# Patient Record
Sex: MALE | Race: OTHER | Employment: UNEMPLOYED | URBAN - METROPOLITAN AREA
[De-identification: names, ages, dates, MRNs, and addresses within clinical notes are randomized per-mention and may not be internally consistent; named-entity substitution may affect disease eponyms.]

---

## 2022-04-13 ENCOUNTER — HOSPITAL ENCOUNTER (EMERGENCY)
Age: 37
Discharge: HOME | End: 2022-04-13
Attending: EMERGENCY MEDICINE

## 2022-04-13 ENCOUNTER — APPOINTMENT (OUTPATIENT)
Dept: ULTRASOUND IMAGING | Age: 37
End: 2022-04-13

## 2022-04-13 VITALS
HEIGHT: 69 IN | DIASTOLIC BLOOD PRESSURE: 77 MMHG | HEART RATE: 96 BPM | TEMPERATURE: 98.2 F | OXYGEN SATURATION: 100 % | RESPIRATION RATE: 18 BRPM | BODY MASS INDEX: 27.36 KG/M2 | SYSTOLIC BLOOD PRESSURE: 130 MMHG | WEIGHT: 184.75 LBS

## 2022-04-13 DIAGNOSIS — N45.3 EPIDIDYMO-ORCHITIS: Primary | ICD-10-CM

## 2022-04-13 LAB
BILIRUBIN URINE: NEGATIVE
BLOOD, URINE: ABNORMAL
CLARITY: ABNORMAL
COLOR: YELLOW
EPITHELIAL CELLS, UA: 2 /HPF (ref 0–5)
GLUCOSE URINE: >=1000 MG/DL
HYALINE CASTS: 1 /LPF (ref 0–8)
KETONES, URINE: 15 MG/DL
LEUKOCYTE ESTERASE, URINE: ABNORMAL
MICROSCOPIC EXAMINATION: YES
NITRITE, URINE: NEGATIVE
PH UA: 6 (ref 5–8)
PROTEIN UA: NEGATIVE MG/DL
RBC UA: 20 /HPF (ref 0–4)
SPECIFIC GRAVITY UA: 1.01 (ref 1–1.03)
SPECIMEN TYPE: NORMAL
TRICHOMONAS VAGINALIS SCREEN: NEGATIVE
URINE REFLEX TO CULTURE: YES
URINE TYPE: ABNORMAL
UROBILINOGEN, URINE: 1 E.U./DL
WBC UA: 397 /HPF (ref 0–5)

## 2022-04-13 PROCEDURE — 87491 CHLMYD TRACH DNA AMP PROBE: CPT

## 2022-04-13 PROCEDURE — 87808 TRICHOMONAS ASSAY W/OPTIC: CPT

## 2022-04-13 PROCEDURE — 99283 EMERGENCY DEPT VISIT LOW MDM: CPT

## 2022-04-13 PROCEDURE — 87186 SC STD MICRODIL/AGAR DIL: CPT

## 2022-04-13 PROCEDURE — 81001 URINALYSIS AUTO W/SCOPE: CPT

## 2022-04-13 PROCEDURE — 96372 THER/PROPH/DIAG INJ SC/IM: CPT

## 2022-04-13 PROCEDURE — 87077 CULTURE AEROBIC IDENTIFY: CPT

## 2022-04-13 PROCEDURE — 87591 N.GONORRHOEAE DNA AMP PROB: CPT

## 2022-04-13 PROCEDURE — 6370000000 HC RX 637 (ALT 250 FOR IP): Performed by: PHYSICIAN ASSISTANT

## 2022-04-13 PROCEDURE — 87086 URINE CULTURE/COLONY COUNT: CPT

## 2022-04-13 PROCEDURE — 6360000002 HC RX W HCPCS: Performed by: PHYSICIAN ASSISTANT

## 2022-04-13 PROCEDURE — 76870 US EXAM SCROTUM: CPT

## 2022-04-13 RX ORDER — CIPROFLOXACIN 500 MG/1
500 TABLET, FILM COATED ORAL 2 TIMES DAILY
Qty: 28 TABLET | Refills: 0 | Status: SHIPPED | OUTPATIENT
Start: 2022-04-13 | End: 2022-04-27

## 2022-04-13 RX ORDER — OXYCODONE HYDROCHLORIDE AND ACETAMINOPHEN 5; 325 MG/1; MG/1
2 TABLET ORAL ONCE
Status: COMPLETED | OUTPATIENT
Start: 2022-04-13 | End: 2022-04-13

## 2022-04-13 RX ORDER — CEFTRIAXONE 500 MG/1
500 INJECTION, POWDER, FOR SOLUTION INTRAMUSCULAR; INTRAVENOUS ONCE
Status: COMPLETED | OUTPATIENT
Start: 2022-04-13 | End: 2022-04-13

## 2022-04-13 RX ADMIN — OXYCODONE HYDROCHLORIDE AND ACETAMINOPHEN 2 TABLET: 5; 325 TABLET ORAL at 15:33

## 2022-04-13 RX ADMIN — CEFTRIAXONE SODIUM 500 MG: 500 INJECTION, POWDER, FOR SOLUTION INTRAMUSCULAR; INTRAVENOUS at 16:48

## 2022-04-13 ASSESSMENT — ENCOUNTER SYMPTOMS
COUGH: 0
EYE PAIN: 0
SHORTNESS OF BREATH: 0
BACK PAIN: 0
VOMITING: 0
SORE THROAT: 0
NAUSEA: 0
ABDOMINAL PAIN: 0

## 2022-04-13 ASSESSMENT — PAIN DESCRIPTION - FREQUENCY: FREQUENCY: CONTINUOUS

## 2022-04-13 ASSESSMENT — PAIN DESCRIPTION - LOCATION: LOCATION: OTHER (COMMENT)

## 2022-04-13 ASSESSMENT — PAIN - FUNCTIONAL ASSESSMENT: PAIN_FUNCTIONAL_ASSESSMENT: 0-10

## 2022-04-13 ASSESSMENT — PAIN DESCRIPTION - PAIN TYPE: TYPE: ACUTE PAIN

## 2022-04-13 ASSESSMENT — PAIN DESCRIPTION - DESCRIPTORS: DESCRIPTORS: SORE

## 2022-04-13 ASSESSMENT — PAIN SCALES - GENERAL: PAINLEVEL_OUTOF10: 10

## 2022-04-13 NOTE — ED PROVIDER NOTES
I have personally performed a face to face diagnostic evaluation on this patient. I have fully participated in the care of this patient I personally saw the patient and performed a substantive portion of the visit including all aspects of the medical decision making. I have reviewed and agree with all pertinent clinical information including history, physical exam, diagnostic tests, and the plan. HPI: Adam Wright presented with testicular swelling on his left side has been seen at other facilities and was started on doxycycline but symptoms continue to get worse. Was having worsening swelling pain as well as some mild hematuria. No other systemic symptoms no other associated symptoms no other known risk factors. No history of STD. Chief Complaint   Patient presents with    Testicle Swelling     patient with testicular swelling seen at other facility had [address] and US negative. patient reports swelling is worse. Review of Systems: See ANGELINA note  Vital Signs: BP (!) 144/78   Pulse 93   Temp 98.2 °F (36.8 °C) (Temporal)   Resp 16   Ht 5' 9\" (1.753 m)   Wt 184 lb 11.9 oz (83.8 kg)   SpO2 96%   BMI 27.28 kg/m²     Alert 44 y.o. male who does not appear toxic or acutely ill  HENT: Atraumatic, oral mucosa moist  Neck: Grossly normal ROM  Chest/Lungs: respiratory effort normal   Abdomen: Soft nontender  : BP note for further detail  Musculoskeletal: Grossly normal ROM  Skin: No palor or diaphoresis    Medical Decision Making and Plan:  Pertinent Labs & Imaging studies reviewed. (See ANGELINA chart for details)  I agree with assessment and plan. Patient has epididymoorchitis seen on ultrasounds despite being on doxycycline. Concern for possible E. coli and/or enteric bug causing infection. Patient is not treated with ceftriaxone so gonococcal infection could also be likely. Will treat with ceftriaxone as well as add ciprofloxacin on days doxycycline and give him follow-up with urology. Afebrile not tachycardic saturating on room air no signs systemic sepsis.      Abdelrahman Casas MD  04/13/22 7214

## 2022-04-13 NOTE — ED PROVIDER NOTES
[address] [facility]      Pt Name: Themarta Ruiz  DTT:[id number]  Mery 1985  Date of evaluation: 4/13/2022  Provider: Purnima Carrion PA-C     This patient was seen evaluated by my attending physician Dr. Kt Frias MD      Chief Complaint:    Chief Complaint   Patient presents with    Testicle Swelling     patient with testicular swelling seen at other facility had [phone] and US negative. patient reports swelling is worse. Nursing Notes, Past Medical Hx, Past Surgical Hx, Social Hx, Allergies, and Family Hx were all reviewed and agreed with or any disagreements were addressed in the HPI.    HPI: (Location, Duration, Timing, Severity, Quality, Assoc Sx, Context, Modifying factors)    Chief Complaint of left testicular swelling. State he was seen initially at [address] [place] [address] at [facility] on Sunday and ultrasound which she said was negative. Denies fever. He said he has had penile discharge in the past.  Also complained having blood in his urine in the past.  Denies fever, no nausea vomiting. Does complain of scrotum pain. Worse than it was Sunday. No injuries. Denies back pain. No weakness. Denies lightheaded or dizziness. No other complaints. He stated he was given doxycycline for 10 days, oxycodone and Motrin 800. This is a  44 y.o. male who presents to emergency room with the above complaint. PastMedical/Surgical History:  History reviewed. No pertinent past medical history. History reviewed. No pertinent surgical history. Medications:  Previous Medications    DOXYCYCLINE (VIBRA-TABS) 100 MG TABLET    Take one tablet by mouth twice daily for 14 days    METRONIDAZOLE (FLAGYL) 500 MG TABLET    Take one tablet by mouth twice daily for 14 days         Review of Systems:  (2-9 systems needed)  Review of Systems   Constitutional: Negative for chills and fever. HENT: Negative for congestion and sore throat. Eyes: Negative for pain and visual disturbance. Respiratory: Negative for cough and shortness of breath. Cardiovascular: Negative for chest pain and leg swelling. Gastrointestinal: Negative for abdominal pain, nausea and vomiting. Genitourinary: Positive for scrotal swelling and testicular pain. Negative for dysuria, frequency, penile discharge, penile pain and penile swelling. Musculoskeletal: Negative for back pain and neck pain. Skin: Negative for rash and wound. Neurological: Negative for dizziness and light-headedness. \"Positives and Pertinent negatives as per HPI\"    Physical Exam:  Physical Exam  Vitals and nursing note reviewed. Cardiovascular:      Rate and Rhythm: Normal rate and regular rhythm. Heart sounds: Normal heart sounds. No murmur heard. No friction rub. No gallop. Pulmonary:      Effort: Pulmonary effort is normal. No respiratory distress. Breath sounds: Normal breath sounds. No wheezing or rales. Chest:      Chest wall: No tenderness. Abdominal:      General: Abdomen is flat. Bowel sounds are normal. There is no distension. Palpations: Abdomen is soft. There is no mass. Tenderness: There is no abdominal tenderness. There is no guarding or rebound. Genitourinary:     Testes:         Right: Tenderness and swelling present. Mass not present. Left: Tenderness and swelling present. Mass not present. Epididymis:      Right: Not inflamed. Tenderness present. Left: Not inflamed. Tenderness present. Lymphadenopathy:      Lower Body: No right inguinal adenopathy. No left inguinal adenopathy. Neurological:      General: No focal deficit present.          MEDICAL DECISION MAKING    Vitals:    Vitals:    04/13/22 1330   BP: (!) 144/78   Pulse: 93   Resp: 16   Temp: 98.2 °F (36.8 °C)   TempSrc: Temporal   SpO2: 96%   Weight: 184 lb 11.9 oz (83.8 kg)   Height: 5' 9\" (1.753 m)       LABS:  Labs Reviewed   URINALYSIS WITH REFLEX TO CULTURE - Abnormal; Notable for the following components:       Result Value    Clarity, UA SL CLOUDY (*)     Glucose, Ur >=1000 (*)     Ketones, Urine 15 (*)     Blood, Urine MODERATE (*)     Leukocyte Esterase, Urine TRACE (*)     All other components within normal limits   MICROSCOPIC URINALYSIS - Abnormal; Notable for the following components:    WBC,  (*)     RBC, UA 20 (*)     All other components within normal limits   C.TRACHOMATIS N.GONORRHOEAE DNA, URINE   CULTURE, URINE   TRICHOMONAS BY EIA        Remainder of labs reviewed and were negative at this time or not returned at the time of this note. RADIOLOGY:   Non-plain film images such as CT, Ultrasound and MRI are read by the radiologist. Suzan Anderson PA-C have directly visualized the radiologic plain film image(s) with the below findings:      Interpretation per the Radiologist below, if available at the time of this note:    [address]   Final Result   Findings are most suggestive of right epididymo-orchitis. Clinical follow-up   to resolution is recommended. Flow was seen within each testicle, arguing against acute torsion. 3 mm left epididymal cyst or spermatocele. RECOMMENDATIONS:   Unavailable              No results found. MEDICAL DECISION MAKING / ED COURSE:      PROCEDURES:   Procedures    None    Patient was given:  Medications   oxyCODONE-acetaminophen (PERCOCET) 5-325 MG per tablet 2 tablet (2 tablets Oral Given 4/13/22 1533)   cefTRIAXone (ROCEPHIN) injection 500 mg (500 mg IntraMUSCular Given 4/13/22 1648)     Emergency room course: Patient on exam cardiovascular regular rhythm, lungs are clear. No wheeze rales or rhonchi noted. Abdomen is soft nontender. Suprapubically no adenopathy. Penis show no rash no erythema no drainage. Testicles show swelling bilaterally to the scrotum no erythema tender with palpation no palpable mass. Patient is alert oriented x4.   Does not appear to be in acute distress. I did review patient ultrasound from [facility] and it was negative. Based on patient complaint that his swelling has gotten worse I would have had a repeated ultrasound. Ultrasound here of testicle and scrotum shows epididymal orchitis. At this time I did have my attending Dr. Juan Jhaveri see this patient with me. He talked to the patient and recommend adding Cipro to the patient regimen. Continue to doxycycline continue Motrin apply ice to the area and wear tight underwear instead of boxers. Patient was okay with this plan. He was given Rocephin here 500 mg IM. He will be discharged stable condition. The patient tolerated their visit well. I evaluated the patient. The physician was available for consultation as needed. The patient and / or the family were informed of the results of any tests, a time was given to answer questions, a plan was proposed and they agreed with plan. CLINICAL IMPRESSION:  1.  Epididymo-orchitis        DISPOSITION  DISPOSITION Decision To Discharge 04/13/2022 04:57:26 PM        PATIENT REFERRED TO:  Ebonie Damon MD  [address] [address]  [phone]    Call in 1 day      [facility] Emergency Department  [address] [facility]  [phone]  Call   If symptoms worsen      DISCHARGE MEDICATIONS:  New Prescriptions    CIPROFLOXACIN (CIPRO) 500 MG TABLET    Take 1 tablet by mouth 2 times daily for 14 days       DISCONTINUED MEDICATIONS:  Discontinued Medications    No medications on file              (Please note the MDM and HPI sections of this note were completed with a voice recognition program.  Efforts were made to edit the dictations but occasionally words are mis-transcribed.)    Electronically signed, Urmila Thakkar PA-C,          Urmila Thakkar PA-C  04/13/22 0719

## 2022-04-14 LAB
C. TRACHOMATIS DNA ,URINE: NEGATIVE
N. GONORRHOEAE DNA, URINE: NEGATIVE

## 2022-04-15 LAB
ORGANISM: ABNORMAL
URINE CULTURE, ROUTINE: ABNORMAL

## 2022-05-06 ENCOUNTER — APPOINTMENT (OUTPATIENT)
Dept: CT IMAGING | Age: 37
DRG: 710 | End: 2022-05-06
Payer: MEDICAID

## 2022-05-06 ENCOUNTER — HOSPITAL ENCOUNTER (INPATIENT)
Age: 37
LOS: 12 days | Discharge: HOME HEALTH | DRG: 710 | End: 2022-05-18
Attending: INTERNAL MEDICINE | Admitting: INTERNAL MEDICINE
Payer: MEDICAID

## 2022-05-06 DIAGNOSIS — N49.2 INFLAMMATORY DISORDERS OF SCROTUM: ICD-10-CM

## 2022-05-06 DIAGNOSIS — N45.3 EPIDIDYMO-ORCHITIS: Primary | ICD-10-CM

## 2022-05-06 DIAGNOSIS — E11.65 TYPE 2 DIABETES MELLITUS WITH HYPERGLYCEMIA, WITHOUT LONG-TERM CURRENT USE OF INSULIN (HCC): ICD-10-CM

## 2022-05-06 DIAGNOSIS — E87.20 LACTIC ACIDOSIS: ICD-10-CM

## 2022-05-06 DIAGNOSIS — R10.31 RIGHT LOWER QUADRANT ABDOMINAL PAIN: ICD-10-CM

## 2022-05-06 DIAGNOSIS — Z78.9 FAILURE OF OUTPATIENT TREATMENT: ICD-10-CM

## 2022-05-06 PROBLEM — R10.9 ABDOMINAL PAIN: Status: ACTIVE | Noted: 2022-05-06

## 2022-05-06 LAB
A/G RATIO: 0.9 (ref 1.1–2.2)
ALBUMIN SERPL-MCNC: 3.7 G/DL (ref 3.4–5)
ALP BLD-CCNC: 182 U/L (ref 40–129)
ALT SERPL-CCNC: 11 U/L (ref 10–40)
ANION GAP SERPL CALCULATED.3IONS-SCNC: 10 MMOL/L (ref 3–16)
AST SERPL-CCNC: 14 U/L (ref 15–37)
BASOPHILS ABSOLUTE: 0.1 K/UL (ref 0–0.2)
BASOPHILS RELATIVE PERCENT: 0.6 %
BILIRUB SERPL-MCNC: 1.2 MG/DL (ref 0–1)
BILIRUBIN URINE: NEGATIVE
BLOOD, URINE: NEGATIVE
BUN BLDV-MCNC: 11 MG/DL (ref 7–20)
CALCIUM SERPL-MCNC: 9.3 MG/DL (ref 8.3–10.6)
CHLORIDE BLD-SCNC: 91 MMOL/L (ref 99–110)
CLARITY: CLEAR
CO2: 25 MMOL/L (ref 21–32)
COLOR: YELLOW
CREAT SERPL-MCNC: 0.5 MG/DL (ref 0.9–1.3)
EOSINOPHILS ABSOLUTE: 0.1 K/UL (ref 0–0.6)
EOSINOPHILS RELATIVE PERCENT: 0.5 %
GFR AFRICAN AMERICAN: >60
GFR NON-AFRICAN AMERICAN: >60
GLUCOSE BLD-MCNC: 237 MG/DL (ref 70–99)
GLUCOSE BLD-MCNC: 411 MG/DL (ref 70–99)
GLUCOSE URINE: >=1000 MG/DL
HCT VFR BLD CALC: 42.3 % (ref 40.5–52.5)
HEMOGLOBIN: 14.8 G/DL (ref 13.5–17.5)
KETONES, URINE: ABNORMAL MG/DL
LACTIC ACID: 1.1 MMOL/L (ref 0.4–2)
LACTIC ACID: 2.1 MMOL/L (ref 0.4–2)
LEUKOCYTE ESTERASE, URINE: NEGATIVE
LYMPHOCYTES ABSOLUTE: 1.6 K/UL (ref 1–5.1)
LYMPHOCYTES RELATIVE PERCENT: 15.5 %
MCH RBC QN AUTO: 31 PG (ref 26–34)
MCHC RBC AUTO-ENTMCNC: 35.1 G/DL (ref 31–36)
MCV RBC AUTO: 88.2 FL (ref 80–100)
MICROSCOPIC EXAMINATION: ABNORMAL
MONOCYTES ABSOLUTE: 1 K/UL (ref 0–1.3)
MONOCYTES RELATIVE PERCENT: 9.4 %
NEUTROPHILS ABSOLUTE: 7.8 K/UL (ref 1.7–7.7)
NEUTROPHILS RELATIVE PERCENT: 74 %
NITRITE, URINE: NEGATIVE
PDW BLD-RTO: 12.4 % (ref 12.4–15.4)
PERFORMED ON: ABNORMAL
PH UA: 6.5 (ref 5–8)
PLATELET # BLD: 344 K/UL (ref 135–450)
PMV BLD AUTO: 7.6 FL (ref 5–10.5)
POTASSIUM REFLEX MAGNESIUM: 4 MMOL/L (ref 3.5–5.1)
PROTEIN UA: NEGATIVE MG/DL
RBC # BLD: 4.8 M/UL (ref 4.2–5.9)
SODIUM BLD-SCNC: 126 MMOL/L (ref 136–145)
SPECIFIC GRAVITY UA: 1.05 (ref 1–1.03)
TOTAL PROTEIN: 8 G/DL (ref 6.4–8.2)
URINE REFLEX TO CULTURE: ABNORMAL
URINE TYPE: ABNORMAL
UROBILINOGEN, URINE: 1 E.U./DL
WBC # BLD: 10.6 K/UL (ref 4–11)

## 2022-05-06 PROCEDURE — 74177 CT ABD & PELVIS W/CONTRAST: CPT

## 2022-05-06 PROCEDURE — 87491 CHLMYD TRACH DNA AMP PROBE: CPT

## 2022-05-06 PROCEDURE — 6360000004 HC RX CONTRAST MEDICATION: Performed by: PHYSICIAN ASSISTANT

## 2022-05-06 PROCEDURE — 96365 THER/PROPH/DIAG IV INF INIT: CPT

## 2022-05-06 PROCEDURE — 2580000003 HC RX 258: Performed by: INTERNAL MEDICINE

## 2022-05-06 PROCEDURE — 96361 HYDRATE IV INFUSION ADD-ON: CPT

## 2022-05-06 PROCEDURE — 85025 COMPLETE CBC W/AUTO DIFF WBC: CPT

## 2022-05-06 PROCEDURE — 6360000002 HC RX W HCPCS: Performed by: INTERNAL MEDICINE

## 2022-05-06 PROCEDURE — 87591 N.GONORRHOEAE DNA AMP PROB: CPT

## 2022-05-06 PROCEDURE — 81003 URINALYSIS AUTO W/O SCOPE: CPT

## 2022-05-06 PROCEDURE — 36415 COLL VENOUS BLD VENIPUNCTURE: CPT

## 2022-05-06 PROCEDURE — 2580000003 HC RX 258: Performed by: PHYSICIAN ASSISTANT

## 2022-05-06 PROCEDURE — 87086 URINE CULTURE/COLONY COUNT: CPT

## 2022-05-06 PROCEDURE — 87040 BLOOD CULTURE FOR BACTERIA: CPT

## 2022-05-06 PROCEDURE — 80053 COMPREHEN METABOLIC PANEL: CPT

## 2022-05-06 PROCEDURE — 6370000000 HC RX 637 (ALT 250 FOR IP): Performed by: INTERNAL MEDICINE

## 2022-05-06 PROCEDURE — 83605 ASSAY OF LACTIC ACID: CPT

## 2022-05-06 PROCEDURE — 99285 EMERGENCY DEPT VISIT HI MDM: CPT

## 2022-05-06 PROCEDURE — 1200000000 HC SEMI PRIVATE

## 2022-05-06 PROCEDURE — 6370000000 HC RX 637 (ALT 250 FOR IP): Performed by: NURSE PRACTITIONER

## 2022-05-06 PROCEDURE — 99255 IP/OBS CONSLTJ NEW/EST HI 80: CPT | Performed by: INTERNAL MEDICINE

## 2022-05-06 PROCEDURE — 96375 TX/PRO/DX INJ NEW DRUG ADDON: CPT

## 2022-05-06 PROCEDURE — 83036 HEMOGLOBIN GLYCOSYLATED A1C: CPT

## 2022-05-06 PROCEDURE — 6360000002 HC RX W HCPCS: Performed by: PHYSICIAN ASSISTANT

## 2022-05-06 PROCEDURE — 94760 N-INVAS EAR/PLS OXIMETRY 1: CPT

## 2022-05-06 RX ORDER — OXYCODONE HYDROCHLORIDE 5 MG/1
5 TABLET ORAL EVERY 6 HOURS PRN
Status: DISCONTINUED | OUTPATIENT
Start: 2022-05-06 | End: 2022-05-15

## 2022-05-06 RX ORDER — NICOTINE POLACRILEX 4 MG
15 LOZENGE BUCCAL PRN
Status: DISCONTINUED | OUTPATIENT
Start: 2022-05-06 | End: 2022-05-18 | Stop reason: HOSPADM

## 2022-05-06 RX ORDER — ONDANSETRON 2 MG/ML
4 INJECTION INTRAMUSCULAR; INTRAVENOUS EVERY 6 HOURS PRN
Status: DISCONTINUED | OUTPATIENT
Start: 2022-05-06 | End: 2022-05-18 | Stop reason: HOSPADM

## 2022-05-06 RX ORDER — PROMETHAZINE HYDROCHLORIDE 25 MG/1
12.5 TABLET ORAL EVERY 6 HOURS PRN
Status: DISCONTINUED | OUTPATIENT
Start: 2022-05-06 | End: 2022-05-18 | Stop reason: HOSPADM

## 2022-05-06 RX ORDER — CIPROFLOXACIN 250 MG/1
250 TABLET, FILM COATED ORAL 2 TIMES DAILY
Status: ON HOLD | COMMUNITY
End: 2022-05-18 | Stop reason: HOSPADM

## 2022-05-06 RX ORDER — MAGNESIUM SULFATE IN WATER 40 MG/ML
2000 INJECTION, SOLUTION INTRAVENOUS PRN
Status: DISCONTINUED | OUTPATIENT
Start: 2022-05-06 | End: 2022-05-18 | Stop reason: HOSPADM

## 2022-05-06 RX ORDER — SODIUM CHLORIDE 9 MG/ML
INJECTION, SOLUTION INTRAVENOUS PRN
Status: DISCONTINUED | OUTPATIENT
Start: 2022-05-06 | End: 2022-05-18 | Stop reason: HOSPADM

## 2022-05-06 RX ORDER — INSULIN LISPRO 100 [IU]/ML
0-12 INJECTION, SOLUTION INTRAVENOUS; SUBCUTANEOUS
Status: DISCONTINUED | OUTPATIENT
Start: 2022-05-06 | End: 2022-05-18 | Stop reason: HOSPADM

## 2022-05-06 RX ORDER — SODIUM CHLORIDE 0.9 % (FLUSH) 0.9 %
10 SYRINGE (ML) INJECTION PRN
Status: DISCONTINUED | OUTPATIENT
Start: 2022-05-06 | End: 2022-05-18 | Stop reason: HOSPADM

## 2022-05-06 RX ORDER — POTASSIUM CHLORIDE 7.45 MG/ML
10 INJECTION INTRAVENOUS PRN
Status: DISCONTINUED | OUTPATIENT
Start: 2022-05-06 | End: 2022-05-18 | Stop reason: HOSPADM

## 2022-05-06 RX ORDER — INSULIN LISPRO 100 [IU]/ML
0-6 INJECTION, SOLUTION INTRAVENOUS; SUBCUTANEOUS NIGHTLY
Status: DISCONTINUED | OUTPATIENT
Start: 2022-05-06 | End: 2022-05-18 | Stop reason: HOSPADM

## 2022-05-06 RX ORDER — POTASSIUM CHLORIDE 20 MEQ/1
40 TABLET, EXTENDED RELEASE ORAL PRN
Status: DISCONTINUED | OUTPATIENT
Start: 2022-05-06 | End: 2022-05-18 | Stop reason: HOSPADM

## 2022-05-06 RX ORDER — SODIUM CHLORIDE 0.9 % (FLUSH) 0.9 %
10 SYRINGE (ML) INJECTION EVERY 12 HOURS SCHEDULED
Status: DISCONTINUED | OUTPATIENT
Start: 2022-05-06 | End: 2022-05-18 | Stop reason: HOSPADM

## 2022-05-06 RX ORDER — DEXTROSE MONOHYDRATE 25 G/50ML
12.5 INJECTION, SOLUTION INTRAVENOUS PRN
Status: DISCONTINUED | OUTPATIENT
Start: 2022-05-06 | End: 2022-05-18 | Stop reason: HOSPADM

## 2022-05-06 RX ORDER — MORPHINE SULFATE 4 MG/ML
4 INJECTION, SOLUTION INTRAMUSCULAR; INTRAVENOUS ONCE
Status: COMPLETED | OUTPATIENT
Start: 2022-05-06 | End: 2022-05-06

## 2022-05-06 RX ORDER — SODIUM CHLORIDE 9 MG/ML
INJECTION, SOLUTION INTRAVENOUS CONTINUOUS
Status: DISCONTINUED | OUTPATIENT
Start: 2022-05-06 | End: 2022-05-18 | Stop reason: HOSPADM

## 2022-05-06 RX ORDER — INSULIN GLARGINE 100 [IU]/ML
0.2 INJECTION, SOLUTION SUBCUTANEOUS NIGHTLY
Status: DISCONTINUED | OUTPATIENT
Start: 2022-05-06 | End: 2022-05-07

## 2022-05-06 RX ORDER — DEXTROSE MONOHYDRATE 50 MG/ML
100 INJECTION, SOLUTION INTRAVENOUS PRN
Status: DISCONTINUED | OUTPATIENT
Start: 2022-05-06 | End: 2022-05-18 | Stop reason: HOSPADM

## 2022-05-06 RX ORDER — ACETAMINOPHEN 325 MG/1
650 TABLET ORAL EVERY 6 HOURS PRN
Status: DISCONTINUED | OUTPATIENT
Start: 2022-05-06 | End: 2022-05-18 | Stop reason: HOSPADM

## 2022-05-06 RX ORDER — LINEZOLID 2 MG/ML
600 INJECTION, SOLUTION INTRAVENOUS EVERY 12 HOURS
Status: DISCONTINUED | OUTPATIENT
Start: 2022-05-06 | End: 2022-05-16

## 2022-05-06 RX ORDER — ENOXAPARIN SODIUM 100 MG/ML
40 INJECTION SUBCUTANEOUS DAILY
Status: DISCONTINUED | OUTPATIENT
Start: 2022-05-06 | End: 2022-05-18 | Stop reason: HOSPADM

## 2022-05-06 RX ORDER — IBUPROFEN 600 MG/1
600 TABLET ORAL EVERY 6 HOURS PRN
COMMUNITY
End: 2022-06-06

## 2022-05-06 RX ORDER — 0.9 % SODIUM CHLORIDE 0.9 %
30 INTRAVENOUS SOLUTION INTRAVENOUS ONCE
Status: COMPLETED | OUTPATIENT
Start: 2022-05-06 | End: 2022-05-06

## 2022-05-06 RX ORDER — ACETAMINOPHEN 650 MG/1
650 SUPPOSITORY RECTAL EVERY 6 HOURS PRN
Status: DISCONTINUED | OUTPATIENT
Start: 2022-05-06 | End: 2022-05-18 | Stop reason: HOSPADM

## 2022-05-06 RX ADMIN — PIPERACILLIN AND TAZOBACTAM 4500 MG: 4; .5 INJECTION, POWDER, LYOPHILIZED, FOR SOLUTION INTRAVENOUS at 19:02

## 2022-05-06 RX ADMIN — INSULIN GLARGINE 16 UNITS: 100 INJECTION, SOLUTION SUBCUTANEOUS at 20:14

## 2022-05-06 RX ADMIN — SODIUM CHLORIDE 2361 ML: 9 INJECTION, SOLUTION INTRAVENOUS at 14:14

## 2022-05-06 RX ADMIN — SODIUM CHLORIDE: 9 INJECTION, SOLUTION INTRAVENOUS at 18:58

## 2022-05-06 RX ADMIN — INSULIN LISPRO 2 UNITS: 100 INJECTION, SOLUTION INTRAVENOUS; SUBCUTANEOUS at 20:13

## 2022-05-06 RX ADMIN — ACETAMINOPHEN 650 MG: 325 TABLET ORAL at 19:43

## 2022-05-06 RX ADMIN — OXYCODONE 5 MG: 5 TABLET ORAL at 21:13

## 2022-05-06 RX ADMIN — IOPAMIDOL 75 ML: 755 INJECTION, SOLUTION INTRAVENOUS at 15:01

## 2022-05-06 RX ADMIN — SODIUM CHLORIDE, PRESERVATIVE FREE 10 ML: 5 INJECTION INTRAVENOUS at 20:20

## 2022-05-06 RX ADMIN — LINEZOLID 600 MG: 600 INJECTION, SOLUTION INTRAVENOUS at 23:28

## 2022-05-06 RX ADMIN — ENOXAPARIN SODIUM 40 MG: 100 INJECTION SUBCUTANEOUS at 18:50

## 2022-05-06 RX ADMIN — VANCOMYCIN HYDROCHLORIDE 1500 MG: 1 INJECTION, POWDER, LYOPHILIZED, FOR SOLUTION INTRAVENOUS at 15:28

## 2022-05-06 RX ADMIN — SODIUM CHLORIDE 20 ML: 9 INJECTION, SOLUTION INTRAVENOUS at 20:26

## 2022-05-06 RX ADMIN — MORPHINE SULFATE 4 MG: 4 INJECTION, SOLUTION INTRAMUSCULAR; INTRAVENOUS at 14:14

## 2022-05-06 ASSESSMENT — ENCOUNTER SYMPTOMS
EYE PAIN: 0
DIARRHEA: 0
VOMITING: 0
COUGH: 0
NAUSEA: 0
ABDOMINAL PAIN: 0
SHORTNESS OF BREATH: 0
BACK PAIN: 0

## 2022-05-06 ASSESSMENT — PAIN DESCRIPTION - FREQUENCY
FREQUENCY: CONTINUOUS
FREQUENCY: CONTINUOUS

## 2022-05-06 ASSESSMENT — PAIN DESCRIPTION - LOCATION
LOCATION: GROIN
LOCATION: SCROTUM

## 2022-05-06 ASSESSMENT — PAIN SCALES - GENERAL
PAINLEVEL_OUTOF10: 7
PAINLEVEL_OUTOF10: 10
PAINLEVEL_OUTOF10: 8
PAINLEVEL_OUTOF10: 10
PAINLEVEL_OUTOF10: 5
PAINLEVEL_OUTOF10: 2

## 2022-05-06 ASSESSMENT — PAIN DESCRIPTION - DESCRIPTORS
DESCRIPTORS: ACHING

## 2022-05-06 ASSESSMENT — PAIN - FUNCTIONAL ASSESSMENT
PAIN_FUNCTIONAL_ASSESSMENT: 0-10
PAIN_FUNCTIONAL_ASSESSMENT: PREVENTS OR INTERFERES SOME ACTIVE ACTIVITIES AND ADLS

## 2022-05-06 ASSESSMENT — PAIN DESCRIPTION - PAIN TYPE: TYPE: ACUTE PAIN

## 2022-05-06 ASSESSMENT — PAIN DESCRIPTION - ONSET: ONSET: ON-GOING

## 2022-05-06 ASSESSMENT — PAIN DESCRIPTION - ORIENTATION: ORIENTATION: RIGHT

## 2022-05-06 NOTE — H&P
Hospital Medicine History & Physical      PCP: No primary care provider on file. Date of Admission: 5/6/2022    Chief Complaint:  Groin pain    History Of Present Illness:  Patient is a 79-year-old male with remote past medical history of diabetes mellitus but not taking any medications who presents to the hospital for right scrotum pain and groin pain. According to the patient it can go up to 10/10 intensity, radiates to right groin, associated with swelling in his groin, he mentions he has only 1 sexual partner, his partner does not have any STD at this time, he denies previous history of STDs, he is not sure of any exposure to HIV. Patient denied fevers chills chest pain nausea vomiting diarrhea dysuria. Patient mentions he was recently started on oral antibiotics as outpatient but the swelling has been worsening so he decided to come to the hospital at the request of urology. Past Medical History:          Diagnosis Date    Diabetes mellitus ([facility] [address]        Past Surgical History:      History reviewed. No pertinent surgical history. ,    Medications Prior to Admission:      Prior to Admission medications    Medication Sig Start Date End Date Taking? Authorizing Provider   ciprofloxacin (CIPRO) 250 MG tablet Take 250 mg by mouth 2 times daily For 14 days   Yes Historical Provider, MD   ibuprofen (ADVIL;MOTRIN) 600 MG tablet Take 600 mg by mouth every 6 hours as needed for Pain   Yes Historical Provider, MD       Allergies:  Patient has no known allergies. Social History:      TOBACCO:   reports that he has never smoked. He has never used smokeless tobacco.  ETOH:   reports no history of alcohol use. Family History:       Reviewed in detail and non contributory      History reviewed. No pertinent family history. REVIEW OF SYSTEMS:   Pertinent positives as noted in the HPI. All other systems reviewed and negative.     PHYSICAL EXAM PERFORMED:    /77   Pulse 85   Temp 98.6 °F (37 °C) Resp 16   Ht 5' 9\" (1.753 m)   Wt 173 lb 8 oz (78.7 kg)   SpO2 100%   BMI 25.62 kg/m²     General appearance:  No apparent distress, cooperative. HEENT:  Normal cephalic, atraumatic without obvious deformity. Conjunctivae/corneas clear. Neck: Supple, with full range of motion. No cervical lymphadenopathy  Respiratory:  Normal respiratory effort. Clear to auscultation, bilaterally without Rales/Wheezes/Rhonchi. Cardiovascular:  Regular rate and rhythm with normal S1/S2 without murmurs, rubs or gallops. Abdomen: There is tenderness and swelling in the right groin as well as swelling in testicle along with tenderness soft, non-tender, non-distended, normal bowel sounds. Musculoskeletal:  No edema noted bilaterally. No tenderness on palpation   Skin: no rash visible  Neurologic:  Neurologically intact without any focal sensory/motor deficits. grossly non-focal.  Psychiatric:  Alert and oriented, normal mood  Peripheral Pulses: +2 palpable, equal bilaterally       Labs:     Recent Labs     05/06/22  1359   WBC 10.6   HGB 14.8   HCT 42.3        Recent Labs     05/06/22  1405   *   K 4.0   CL 91*   CO2 25   BUN 11   CREATININE 0.5*   CALCIUM 9.3     Recent Labs     05/06/22  1405   AST 14*   ALT 11   BILITOT 1.2*   ALKPHOS 182*     No results for input(s): INR in the last 72 hours. No results for input(s): Kiya Mead in the last 72 hours. Urinalysis:      Lab Results   Component Value Date    NITRU Negative 05/06/2022    WBCUA 397 04/13/2022    BACTERIA Rare 02/27/2014    RBCUA 20 04/13/2022    BLOODU Negative 05/06/2022    SPECGRAV 1.052 05/06/2022    GLUCOSEU >=1000 05/06/2022       Radiology:       CT ABDOMEN PELVIS W IV CONTRAST Additional Contrast? None   Final Result   Worsening right epididymal orchitis as described above with a small fluid   collection in the pelvis as measured above.                  Active Hospital Problems    Diagnosis Date Noted    Abdominal pain [R10.9] 05/06/2022     Priority: Medium       Patient is a 26-year-old male with remote past medical history of diabetes mellitus but not taking any medications who presents to the hospital for right scrotum pain and groin pain. According to the patient it can go up to 10/10 intensity, radiates to right groin, associated with swelling in his groin, he mentions he has only 1 sexual partner, his partner does not have any STD at this time, he denies previous history of STDs, he is not sure of any exposure to HIV. Patient denied fevers chills chest pain nausea vomiting diarrhea dysuria. Patient mentions he was recently started on oral antibiotics as outpatient but the swelling has been worsening so he decided to come to the hospital at the request of urology. Assessment  Worsening right epididymal orchitis  history of diabetes mellitus, medication nonadherence  Hyponatremia  Lactic acidosis  Sepsis present on admission likely secondary to #1      Plan  Started on vancomycin, cefepime  Check blood cultures, urine culture  Monitor lactic acid, urology, ID consulted  IV fluid therapy with normal saline  Start weight-based long-acting insulin, insulin sliding scale  Check A1c, consult diabetes educator  DVT prophylaxis-Lovenox  Diet: ADULT DIET; Regular  Code Status: Full Code    PT/OT Eval Status: ordered    Dispo - pending clinical improvement       Melanie Zhang MD    The note was completed using EMR and Dragon dictation system. Every effort was made to ensure accuracy; however, inadvertent computerized transcription errors may be present. Thank you No primary care provider on file. for the opportunity to be involved in this patient's care. If you have any questions or concerns please feel free to contact me at [phone].     Melanie Zhang MD

## 2022-05-06 NOTE — PROGRESS NOTES
Clinical Pharmacy Note  Dose Adjustment    Lionel Castaneda is ordered Zosyn. The dose has been adjusted to 3375mg over 30 minutes x 1 dose then 3375mg q8h extended infusion per protocol. Pharmacy will continue to monitor and adjust dose as needed for changes in renal function.     Carlos Eduardo Jordan, [facility], 5/6/2022 4:35 PM

## 2022-05-06 NOTE — CONSULTS
Infectious Diseases Inpatient Consult Note      Reason for Consult:   Rt epididymo orchitis and possible abscess formation with on going pain and uncontrolled DM     Requesting Physician:  ISAAK Morejon    Primary Care Physician:  No primary care provider on file. History Obtained From:  [facility] and Patient     CHIEF COMPLAINT:     Chief Complaint   Patient presents with    Groin Swelling     right sided testical swelling         HISTORY OF PRESENT ILLNESS:  44 y.o. man with DM uncontrolled last HbA1C > 10 nearly 2 yrs ago and not been on meds due to lack of insurance was seen in ED on  3/18/22 for Dysuria and UA very abnormal and urine cx with Klebsiella noted was placed on Bactrim back then and had second visit to ED on 4/11/22 with Rt scrotal pain and swelling USG negative was placed on Doxycycline , was seen in ED hereon   4/13/22 was given IM Cetriaxone and added Flagyl and Cipro referred to Urology - was seen by . Urine ccx on that visit 25k of Klebsiella noted. He is now admitted with worsening pain Rt scrotal swelling and redness spreading up the Rt inguinal canal. He has no pain with urination but has severe scrotal pain. CT abd/pelvis on this admit with fluid in the inguinal canal with on going redness and concern for deep infection. 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 He is not insured not sure if he has taken the antibiotics ? ? Location : Rt groin pain, swelling and redness       Quality : aching, burning        Severity : 10/10    Duration :  4 weeks    Timing : constant   Context :  UTI and DM poor control     Modifying factors : none   Associated signs and symptoms: pain swelling local redness          Past Medical History:    Past Medical History:   Diagnosis Date    Diabetes mellitus ([facility] [address]        Past Surgical History:    History reviewed.  No pertinent surgical history. Current Medications:    Outpatient Medications Marked as Taking for the 5/6/22 encounter [facility] Encounter)   Medication Sig Dispense Refill    ciprofloxacin (CIPRO) 250 MG tablet Take 250 mg by mouth 2 times daily For 14 days      ibuprofen (ADVIL;MOTRIN) 600 MG tablet Take 600 mg by mouth every 6 hours as needed for Pain         Allergies:  Patient has no known allergies. Immunizations : There is no immunization history on file for this patient. Social History:    Social History     Tobacco Use    Smoking status: Never Smoker    Smokeless tobacco: Never Used   Substance Use Topics    Alcohol use: No    Drug use: No     Social History     Tobacco Use   Smoking Status Never Smoker   Smokeless Tobacco Never Used      Family History : no DVT no COPD        REVIEW OF SYSTEMS:      Constitutional:   fevers ++ , chills ++ , night sweats  Eyes:  negative for blurred vision, eye discharge, visual disturbance   HEENT:  negative for hearing loss, ear drainage,nasal congestion  Respiratory:  negative for cough, shortness of breath or hemoptysis   Cardiovascular:  negative for chest pain, palpitations, syncope  Gastrointestinal:  negative for nausea, vomiting, diarrhea, constipation, abdominal pain  Genitourinary:  Rt groin pain + scrotal swelling + redness++  dysuria, urinary incontinence, hematuria  Hematologic/Lymphatic:  negative for easy bruising, bleeding and lymphadenopathy  Allergic/Immunologic:  negative for recurrent infections, angioedema, anaphylaxis   Endocrine:  negative for weight changes, polyuria, polydipsia and polyphagia  Musculoskeletal:  negative for joint  pain, swelling, decreased range of motion  Integumentary: No rashes, skin lesions  Neurological:  negative for headaches, slurred speech, unilateral weakness  Psychiatric: negative for hallucinations,confusion,agitation.      PHYSICAL EXAM:      Vitals:    /77   Pulse 85   Temp 98.6 °F (37 °C)   Resp 16   Ht 5' 9\" (1.753 m)   Wt 173 lb 8 oz (78.7 kg)   SpO2 100%   BMI 25.62 kg/m²     General Appearance: alert,in some  acute distress, no pallor, no icterus   Skin: warm and dry, no rash or erythema  Head: normocephalic and atraumatic  Eyes: pupils equal, round, and reactive to light, conjunctivae normal  ENT: tympanic membrane, external ear and ear canal normal bilaterally, nose without deformity, nasal mucosa and turbinates normal without polyps  Neck: supple and non-tender without mass, no thyromegaly  no cervical lymphadenopathy  Pulmonary/Chest: clear to auscultation bilaterally- no wheezes, rales or rhonchi, normal air movement, no respiratory distress  Cardiovascular: normal rate, regular rhythm, normal S1 and S2, no murmurs, rubs, clicks, or gallops, no carotid bruits  Abdomen: soft, non-tender, non-distended, normal bowel sounds, no masses or organomegaly  Extremities: no cyanosis, clubbing or edema  Musculoskeletal: normal range of motion, no joint swelling, deformity or tenderness  Integumentary: No rashes, no abnormal skin lesions, no petechiae  Neurologic: reflexes normal and symmetric, no cranial nerve deficit  Psych:  Orientation, sensorium, mood normal   Lines:IV  Rt groin inguinal area on going swelling local pain redness with cellulitis and Rt scrotal swelling and pain no skin changes     DATA:    CBC:   Lab Results   Component Value Date    WBC 10.6 05/06/2022    HGB 14.8 05/06/2022    HCT 42.3 05/06/2022    MCV 88.2 05/06/2022     05/06/2022     RENAL:   Lab Results   Component Value Date    CREATININE 0.5 (L) 05/06/2022    BUN 11 05/06/2022     (L) 05/06/2022    K 4.0 05/06/2022    CL 91 (L) 05/06/2022    CO2 25 05/06/2022     SED RATE: No results found for: SEDRATE  CK: No results found for: CKTOTAL  CRP: No results found for: CRP  Hepatic Function Panel:   Lab Results   Component Value Date    ALKPHOS 182 05/06/2022    ALT 11 05/06/2022    AST 14 05/06/2022    PROT 8.0 05/06/2022    BILITOT 1.2 05/06/2022    LABALBU 3.7 05/06/2022     UA:  Lab Results   Component Value Date    COLORU Yellow 05/06/2022    CLARITYU Clear 05/06/2022    GLUCOSEU >=1000 05/06/2022    BILIRUBINUR Negative 05/06/2022    KETUA TRACE 05/06/2022    SPECGRAV 1.052 05/06/2022    BLOODU Negative 05/06/2022    PHUR 6.5 05/06/2022    PROTEINU Negative 05/06/2022    UROBILINOGEN 1.0 05/06/2022    NITRU Negative 05/06/2022    LEUKOCYTESUR Negative 05/06/2022    LABMICR Not Indicated 05/06/2022    URINETYPE NotGiven 05/06/2022      Urine Microscopic:   Lab Results   Component Value Date    BACTERIA Rare 02/27/2014    HYALCAST 1 04/13/2022    WBCUA 397 04/13/2022    RBCUA 20 04/13/2022    EPIU 2 04/13/2022     Urine Reflex to Culture:   Lab Results   Component Value Date    URRFLXCULT Not Indicated 05/06/2022     Lactic acid  2.1     HEMOGLOBIN, GLYCOSYLATED (A1C)  Specimen:  Blood specimen (specimen) - Blood (substance)   Ref Range & Units 3 yr ago Comments   HEMOGLOBIN A1C 4.8 - 5.6 % 10.9 High                                                          .            Prediabetes: 5.7 - 6.4            Diabetes: >6.4            Glycemic control for adults with diabetes: <7.0   Resulting Agency  [facility]      Narrative  Performed by [facility]  Performed at:  1 - [facility]     MICRO: cultures reviewed and updated by me        1 Result Note    Component 4/13/22 1438   Organism Klebsiella pneumoniae Abnormal     Urine Culture, Routine 25,000 CFU/ml    Resulting Agency [address] [facility]          Susceptibility      Klebsiella pneumoniae (1)    Antibiotic Interpretation Microscan  Method Status    amoxicillin-clavulanate Sensitive <=8/4 mcg/mL BACTERIAL SUSCEPTIBILITY PANEL BY KRISTI     ampicillin Resistant >16 mcg/mL BACTERIAL SUSCEPTIBILITY PANEL BY KRISTI     ampicillin-sulbactam Sensitive <=8/4 mcg/mL BACTERIAL SUSCEPTIBILITY PANEL BY KRISTI     ceFAZolin Sensitive <=2 mcg/mL BACTERIAL SUSCEPTIBILITY PANEL BY KRISTI      NOTE: Cefazolin should only be used for uncomplicated UTI         for E.coli or Klebsiella pneumoniae.        cefepime Sensitive <=2 mcg/mL BACTERIAL SUSCEPTIBILITY PANEL BY KRISTI     cefTRIAXone Sensitive <=1 mcg/mL BACTERIAL SUSCEPTIBILITY PANEL BY KRISTI     cefuroxime Sensitive <=4 mcg/mL BACTERIAL SUSCEPTIBILITY PANEL BY KRISTI     ciprofloxacin Sensitive <=1 mcg/mL BACTERIAL SUSCEPTIBILITY PANEL BY KRISTI     ertapenem Sensitive <=0.5 mcg/mL BACTERIAL SUSCEPTIBILITY PANEL BY KRISTI     gentamicin Sensitive <=4 mcg/mL BACTERIAL SUSCEPTIBILITY PANEL BY KRISTI     meropenem Sensitive <=1 mcg/mL BACTERIAL SUSCEPTIBILITY PANEL BY KRISTI     nitrofurantoin Resistant >64 mcg/mL BACTERIAL SUSCEPTIBILITY PANEL BY KRISTI     piperacillin-tazobactam Sensitive <=16 mcg/mL BACTERIAL SUSCEPTIBILITY PANEL BY KRISTI     trimethoprim-sulfamethoxazole Sensitive <=2/38 mcg/mL BACTERIAL SUSCEPTIBILITY PANEL BY KRISTI          Narrative  Performed by: [facility]  ORDER#: [id number]                          ORDERED BY: Mono Lovell   SOURCE: Urine Clean Catch                  COLLECTED:  04/13/22              Procedure Component Value Units Date/Time   Culture, Blood 2 [[id number]] Collected: 05/06/22 1359   Order Status: Sent Specimen: Blood Updated: 05/06/22 1420   Culture, Blood 1 [[id number]] Collected: 05/06/22 1359   Order Status: Sent Specimen: Blood Updated: 05/06/22 1420     ago    SPECIMEN DESCRIPTION  Urine, Clean Catch    SPECIMEN DESCRIPTION  Tested at [address] [id number]    SPECIMEN TYPE  Urine    SPECIMEN TYPE  Tested at [address] [id number] [facility]  (NOTE) [address] CFU/mL Klebsiella pneumoniae Tested at: [place] [address] [address]    REPORT STATUS  03/22/2022 FINAL REPORT    ORGANISM  KLEBSIELLA PNEUMONIAE    [facility] [id number] Susceptibility    Organism Antibiotic Method Susceptibility   Klebsiella pneumoniae Amikacin KRISTI <=16: Sensitive    Comment: SUSCEPTIBLE   Klebsiella pneumoniae Ampicillin + Sulbactam KRISTI 8/4: Sensitive    Comment: SUSCEPTIBLE   Klebsiella pneumoniae Aztreonam KRISTI <=4: Sensitive    Comment: SUSCEPTIBLE   Klebsiella pneumoniae Ciprofloxacin KRISTI <=0.25: Sensitive    Comment: SUSCEPTIBLE   Klebsiella pneumoniae Ertapenem KRISTI <=0.5: Sensitive    Comment: SUSCEPTIBLE   Klebsiella pneumoniae Gentamicin KRISTI <=2: Sensitive    Comment: SUSCEPTIBLE   Klebsiella pneumoniae Imipenem KRISTI <=1: Sensitive    Comment: SUSCEPTIBLE   Klebsiella pneumoniae Levofloxacin KRISTI <=0.5: Sensitive    Comment: SUSCEPTIBLE   Klebsiella pneumoniae Meropenem KRISTI <=1: Sensitive    Comment: SUSCEPTIBLE   Klebsiella pneumoniae Nitrofurantoin KRISTI <=32: Sensitive    Comment: SUSCEPTIBLE   Klebsiella pneumoniae PIP Tazo KRISTI <=8: Sensitive    Comment: SUSCEPTIBLE   Klebsiella pneumoniae Tetracycline KRISTI <=4: Sensitive    Comment: SUSCEPTIBLE   Klebsiella pneumoniae Tobramycin KRISTI <=2: Sensitive    Comment: SUSCEPTIBLE   Klebsiella pneumoniae Amoxicillin + Clavulanate KRISTI <=8/4: Sensitive    Comment: SUSCEPTIBLE   Klebsiella pneumoniae Cefazolin KRISTI <=2: Sensitive    Comment: SUSCEPTIBLE   Klebsiella pneumoniae Cefoxitin KRISTI <=8: Sensitive    Comment: SUSCEPTIBLE   Klebsiella pneumoniae Trimethoprim + Sulfamethoxazole KRISTI <=0.5/9.5: Sensitive    Comment: SUSCEPTIBLE   Comment: KLEBSIELLA PNEUMONIAE   Specimen Collected: 03/18/22 11:26 PM Last Resulted: 03/22/22 10:47 AM   Received From: [employer]  Result Received: 05/06/         Blood Culture: No results found for: BC, BLOODCULT2    Viral Culture:    No results found for: COVID19  Urine Culture: No results for input(s): LABURIN in the last 72 hours.     Scheduled Meds:   sodium chloride flush  10 mL IntraVENous 2 times per day    enoxaparin  40 mg SubCUTAneous Daily    piperacillin-tazobactam  3,375 mg IntraVENous Once    piperacillin-tazobactam  3,375 mg IntraVENous Q8H    vancomycin (VANCOCIN) intermittent dosing (placeholder)   Other RX Placeholder    [START ON 5/7/2022] vancomycin  1,000 mg IntraVENous Q8H       Continuous Infusions:   sodium chloride         PRN Meds:  sodium chloride flush, sodium chloride, potassium chloride **OR** potassium alternative oral replacement **OR** potassium chloride, potassium chloride, magnesium sulfate, promethazine **OR** ondansetron, magnesium hydroxide, acetaminophen **OR** acetaminophen    Imaging:   CT ABDOMEN PELVIS W IV CONTRAST Additional Contrast? None   Final Result   Worsening right epididymal orchitis as described above with a small fluid   collection in the pelvis as measured above. USG sCROTUM :  4/13/22    Impression   Findings are most suggestive of right epididymo-orchitis.  Clinical follow-up   to resolution is recommended.       Flow was seen within each testicle, arguing against acute torsion.       3 mm left epididymal cyst or spermatocele.       RECOMMENDATIONS:   Unavailable       All pertinent images and reports for the current Hospitalization were reviewed by me.     IMPRESSION:    Patient Active Problem List   Diagnosis    Abdominal pain     Sepsis  Fevers   WBC normal from partial treatment   Rt inguinal fluid collection concern for abscess on the CT  Rt epididymo orchitis with extension into inguinal canal  UTI recent with Klebsiella pneumoniae partially treated  DM poor control ( NOT TAKING MEDS > 2 YR due to lack of INSURANCE)  Remote HISTORY of Gonorrhea  CT abd/pelvis with worsening of Orchitis compared to before      UNfortunately has not responded to out patient treatment as expected despite appropriate course of abx makes me wonder if he has not Taken his MEDS  Due to Lack of INSURANCE or due to Uncontrolled DM he is having complications    As this is some what unusual if Klebsiella is the Culprit Organism was sensitive to Bactrim, Cipro  Etc       Labs, Microbiology, Radiology and pertinent results from current hospitalization and care every where were reviewed by me as a part of the consultation. PLAN :  1. IV Zosyn x 4.5 gm Q 8hrs HIGH dose   2. D/C iv vaNCOMYCIN risk for ISNDY in uncontrolled DM   3. IV Linezolid x 600 mg q 12 hrs  4  IV Fluconazole x 400 mg   5. Check HbA1C  6. bLOOD CX   7. HIV, Hepatitis, Gonorrhea, Chlamydia, Syphilis screen   8. ESR. CRP  9. Social service assistance for antibiotic help  10. If not improving on IV abx will need surgery - UROLOGY following may have to discuss with them situation is concerning     Discussed with patient/Family and Nursing   Risk of Complications/Morbidity: High      · Illness(es)/ Infection present that pose threat to bodily function. · There is potential for severe exacerbation of infection/side effects of treatment. · Therapy requires intensive monitoring for antimicrobial agent toxicity. Thanks for allowing me to participate in your patient's care please call me with any questions or concerns.     Dr. Adrianne Lesches MD  [address] [place] [address] Physician  Phone: [phone]   Fax : [phone]

## 2022-05-06 NOTE — CONSULTS
Consulting Physician: TAMMY Villela    Reason for Consult: failed outpatient antibiotic for epididymitis    History of Present Illness: Elta Babinski is a 44 y.o. male with history of DM who was advised to come to the ED today for IV antibiotics and CT scan by Dr. Nehal Austin for failed outpatient treatment of right epididymo-orchitis that started about 4 weeks ago. He was treated outpatient with Doxy, Cipro and Levaquin. CT scan with worsening right epididymal orchitis and a small fluid collection adjacent to the bladder measuring 2.9 x 1.4cm. He denies any urinary changes, dysuria or hematuria. Past Medical History:   Past Medical History:   Diagnosis Date    [facility]        Past Surgical History:  History reviewed. No pertinent surgical history. Social History:  Social History     Socioeconomic History    Marital status: Single     Spouse name: Not on file    Number of children: Not on file    Years of education: Not on file    Highest education level: Not on file   Occupational History    Not on file   Tobacco Use    Smoking status: Never Smoker    Smokeless tobacco: Never Used   Substance and Sexual Activity    Alcohol use: No    Drug use: No    Sexual activity: Not Currently   Other Topics Concern    Not on file   Social History Narrative    Not on file     Social Determinants of Health     Financial Resource Strain:     Difficulty of Paying Living Expenses: Not on file   Food Insecurity:     Worried About Running Out of Food in the Last Year: Not on file    Lashae of Food in the Last Year: Not on file   Transportation Needs:     Lack of Transportation (Medical): Not on file    Lack of Transportation (Non-Medical):  Not on file   Physical Activity:     Days of Exercise per Week: Not on file    Minutes of Exercise per Session: Not on file   Stress:     Feeling of Stress : Not on file   Social Connections:     Frequency of Communication with Friends and Family: Not on file    Frequency of Social Gatherings with Friends and Family: Not on file    Attends Mu-ism Services: Not on file    Active Member of Clubs or Organizations: Not on file    Attends Club or Organization Meetings: Not on file    Marital Status: Not on file   Intimate Partner Violence:     Fear of Current or Ex-Partner: Not on file    Emotionally Abused: Not on file    Physically Abused: Not on file    Sexually Abused: Not on file   Housing Stability:     Unable to Pay for Housing in the Last Year: Not on file    Number of [place] in the Last Year: Not on file    Unstable Housing in the Last Year: Not on file       Family History:  History reviewed. No pertinent family history. Meds:   Current Facility-Administered Medications: 0.9 % sodium chloride bolus, 30 mL/kg, IntraVENous, Once  vancomycin (VANCOCIN) 1500 mg in dextrose 5 % 250 mL IVPB, 1,500 mg, IntraVENous, Once    Review of Systems:  10 Systems were reviewed and negative except as in HPI    Vitals:  /60   Pulse 93   Temp 98.6 °F (37 °C)   Resp 16   Wt 173 lb 8 oz (78.7 kg)   SpO2 99%   BMI 25.62 kg/m²   No intake or output data in the 24 hours ending 05/06/22 0914    Physical Exam:  General Appearance: Alert and oriented, cooperative, no distress, appears stated age  Head: Normocephalic, without obvious abnormality, atraumatic  Back: no CVA tenderness  Lungs: respirations unlabored, no wheezing  Heart: Regular rate and rhythm, no lower extremity edema noted  Abdomen: Soft, non-tender, non-distended, no masses  Skin: Skin color, texture, turgor normal, no rashes or lesions  Neurologic: no gross deficits  Male :   Nonpalpable bladder   No CVA tenderness   Spontaneously voiding   Penis appears normal and uncircumcised   Urethral meatus is normal in size and location   Scrotum appears normal right testicle is tender and firmness noted to right pelvis.    SHELL Not indicated    Labs:  CBC   Lab Results Component Value Date    WBC 10.6 05/06/2022    RBC 4.80 05/06/2022    HGB 14.8 05/06/2022    HCT 42.3 05/06/2022    MCV 88.2 05/06/2022    MCH 31.0 05/06/2022    MCHC 35.1 05/06/2022    RDW 12.4 05/06/2022     05/06/2022    MPV 7.6 05/06/2022     BMP   Lab Results   Component Value Date     05/06/2022    K 4.0 05/06/2022    CL 91 05/06/2022    CO2 25 05/06/2022    BUN 11 05/06/2022    CREATININE 0.5 05/06/2022    GLUCOSE 411 05/06/2022    CALCIUM 9.3 05/06/2022       Urinalysis:   Lab Results   Component Value Date    COLORU Yellow 05/06/2022    GLUCOSEU >=1000 05/06/2022    BLOODU Negative 05/06/2022    NITRU Negative 05/06/2022    LEUKOCYTESUR Negative 05/06/2022       Imaging:  Pertinent images and radiologist's report were reviewed independently  CT abdomen/pelvis 5/6/22  Impression   Worsening right epididymal orchitis as described above with a small fluid   collection in the pelvis as measured above. Impression/Plan:   - 37y. o. male with worsening right epididymo-orchitis. Failed outpatient antibiotic treatment.   - CT scan with small fluid collection in pelvis adjacent to the bladder.   - Vancomycin started in ED, will add Zosyn and consult ID.  - Will monitor at this time with IV antibiotics, if this doesn't improve with antibiotic therapy in the next few days, may require orchiectomy.      ISAAK Deal - CNP 5/1/19898:75 PM

## 2022-05-06 NOTE — ED PROVIDER NOTES
Constitutional: Negative for chills, fatigue and fever. Eyes: Negative for pain. Respiratory: Negative for cough and shortness of breath. Cardiovascular: Negative for chest pain. Gastrointestinal: Negative for abdominal pain, diarrhea, nausea and vomiting. Genitourinary: Positive for scrotal swelling and testicular pain. Negative for dysuria. Musculoskeletal: Negative for back pain, neck pain and neck stiffness. Skin: Negative for rash. Neurological: Negative for dizziness and headaches. Psychiatric/Behavioral: Negative for confusion. \"Positives and Pertinent negatives as per HPI\"    Physical Exam:  Physical Exam  Vitals and nursing note reviewed. Constitutional:       General: He is not in acute distress. Appearance: He is well-developed. He is not diaphoretic. HENT:      Head: Normocephalic and atraumatic. Eyes:      General:         Right eye: No discharge. Left eye: No discharge. Pulmonary:      Effort: No respiratory distress. Breath sounds: No stridor. Genitourinary:     Comments: See attached photo, pain, swelling, edema to the right testicle with erythema and swelling extending up into the right lower groin  Musculoskeletal:         General: Normal range of motion. Cervical back: Normal range of motion and neck supple. Skin:     General: Skin is warm and dry. Coloration: Skin is not pale. Neurological:      Mental Status: He is alert and oriented to person, place, and time. Comments: No gross facial drooping. Moves all 4 extremities spontaneously.    Psychiatric:         Behavior: Behavior normal.             MEDICAL DECISION MAKING    Vitals:    Vitals:    05/06/22 1500 05/06/22 1531 05/06/22 1545 05/06/22 1600   BP:  125/60 130/74 119/72   Pulse: 96 93 87 86   Resp:  16 16    Temp:       SpO2: 97% 99% 100% 100%   Weight:           LABS:  Labs Reviewed   CBC WITH AUTO DIFFERENTIAL - Abnormal; Notable for the following components: Result Value    Neutrophils Absolute 7.8 (*)     All other components within normal limits   COMPREHENSIVE METABOLIC PANEL W/ REFLEX TO MG FOR LOW K - Abnormal; Notable for the following components:    Sodium 126 (*)     Chloride 91 (*)     Glucose 411 (*)     CREATININE 0.5 (*)     Albumin/Globulin Ratio 0.9 (*)     Total Bilirubin 1.2 (*)     Alkaline Phosphatase 182 (*)     AST 14 (*)     All other components within normal limits   LACTIC ACID - Abnormal; Notable for the following components:    Lactic Acid 2.1 (*)     All other components within normal limits   URINALYSIS WITH REFLEX TO CULTURE - Abnormal; Notable for the following components:    Glucose, Ur >=1000 (*)     Ketones, Urine TRACE (*)     All other components within normal limits   CULTURE, BLOOD 1   CULTURE, BLOOD 2   LACTIC ACID        Remainder of labs reviewed and were negative at this time or not returned at the time of this note. RADIOLOGY:   Non-plain film images such as CT, Ultrasound and MRI are read by the radiologist. TAMMY Zelaya have directly visualized the radiologic plain film image(s) with the below findings:      Interpretation per the Radiologist below, if available at the time of this note:    CT ABDOMEN PELVIS W IV CONTRAST Additional Contrast? None   Final Result   Worsening right epididymal orchitis as described above with a small fluid   collection in the pelvis as measured above. US SCROTUM AND TESTICLES    Result Date: 4/13/2022  EXAMINATION: ULTRASOUND OF THE SCROTUM/TESTICLES WITH COLOR DOPPLER FLOW EVALUATION 4/13/2022 TECHNIQUE: Duplex ultrasound using B-mode/gray scaled imaging, Doppler spectral analysis and color flow Doppler was obtained of the testicles. COMPARISON: None.  HISTORY: ORDERING SYSTEM PROVIDED HISTORY: pain and swelling TECHNOLOGIST PROVIDED HISTORY: Reason for exam:->pain and swelling FINDINGS: Measurements: Right Testicle: 3.6 x 3.0 x 2.6 cm Left Testicle: 4.3 x 3.1 x 2.1 cm Right: Grey Scale: Hyperemia is demonstrated of the right testicle. No focal lesion was seen at real-time imaging by the sonographer. Doppler Evaluation: Flow was seen within the right testicle. Scrotal Sac: No evidence of hydrocele. Epididymis: Right epididymal tail appears enlarged and hyperemic, with heterogeneous echotexture. Left: Grey Scale: The left testicle demonstrates normal homogeneous echotexture without focal lesion. No evidence of testicular microlithiasis. Doppler Evaluation: Flow was seen within the left testicle. Scrotal Sac: No evidence of hydrocele. Epididymis: Cyst is seen within the left epididymis measuring 0.3 x 0.3 x 0.3 cm. Findings are most suggestive of right epididymo-orchitis. Clinical follow-up to resolution is recommended. Flow was seen within each testicle, arguing against acute torsion. 3 mm left epididymal cyst or spermatocele. RECOMMENDATIONS: Unavailable          MEDICAL DECISION MAKING / ED COURSE:      PROCEDURES:   Procedures    None    Patient was given:  Medications   vancomycin (VANCOCIN) 1500 mg in dextrose 5 % 250 mL IVPB (1,500 mg IntraVENous New Bag 5/6/22 1528)   morphine (PF) injection 4 mg (4 mg IntraVENous Given 5/6/22 1414)   0.9 % sodium chloride bolus (2,361 mLs IntraVENous New Bag 5/6/22 1414)   iopamidol (ISOVUE-370) 76 % injection 75 mL (75 mLs IntraVENous Given 5/6/22 1501)       ED COURSE & MEDICAL DECISION MAKING    Pertinent Labs & Imaging studies reviewed. (See chart for details)   -  Patient seen and evaluated in the emergency department. -  Triage and nursing notes reviewed and incorporated. -  Old chart records reviewed and incorporated.  -Old records reviewed and summarized as detailed above in HPI. Patient has failed multiple antibiotics, including Doxy, Rocephin, Cipro, Levaquin. His urologist saw him today and sent him to the emergency department.   I called and spoke with urologist Dr. Massiel Eugene, who agrees with plan for admission and IV antibiotics, obtaining CT scan. He is concerned for organizing infection, patient may end up requiring orchiectomy. Ordered vancomycin in the emergency department, 3 mL per keg fluids. Patient feels improved after morphine.  -  White blood cell count within normal limits however lactic was elevated at 2.1. His glucose is elevated at 411, patient has a history of diabetes but states it has been several years since he has been taking his metformin. His sodium is 126. No anion gap. He was greater than 1000 glucose in the urine with trace ketones. -  CT shows Worsening right epididymal orchitis as described above with a small fluid   collection in the pelvis. -I spoke with Kane Patterson and PE with the urology group, who conveyed results to Dr. Nehal Austin.   Consult was placed to the hospitalist for admission    Results for orders placed or performed during the hospital encounter of 05/06/22   CBC with Auto Differential   Result Value Ref Range    WBC 10.6 4.0 - 11.0 K/uL    RBC 4.80 4.20 - 5.90 M/uL    Hemoglobin 14.8 13.5 - 17.5 g/dL    Hematocrit 42.3 40.5 - 52.5 %    MCV 88.2 80.0 - 100.0 fL    MCH 31.0 26.0 - 34.0 pg    MCHC 35.1 31.0 - 36.0 g/dL    RDW 12.4 12.4 - 15.4 %    Platelets 540 859 - 378 K/uL    MPV 7.6 5.0 - 10.5 fL    Neutrophils % 74.0 %    Lymphocytes % 15.5 %    Monocytes % 9.4 %    Eosinophils % 0.5 %    Basophils % 0.6 %    Neutrophils Absolute 7.8 (H) 1.7 - 7.7 K/uL    Lymphocytes Absolute 1.6 1.0 - 5.1 K/uL    Monocytes Absolute 1.0 0.0 - 1.3 K/uL    Eosinophils Absolute 0.1 0.0 - 0.6 K/uL    Basophils Absolute 0.1 0.0 - 0.2 K/uL   Comprehensive Metabolic Panel w/ Reflex to MG   Result Value Ref Range    Sodium 126 (L) 136 - 145 mmol/L    Potassium reflex Magnesium 4.0 3.5 - 5.1 mmol/L    Chloride 91 (L) 99 - 110 mmol/L    CO2 25 21 - 32 mmol/L    Anion Gap 10 3 - 16    Glucose 411 (H) 70 - 99 mg/dL    BUN 11 7 - 20 mg/dL    CREATININE 0.5 (L) 0.9 - 1.3 mg/dL    GFR Non-African American >60 >60 GFR African American >60 >60    Calcium 9.3 8.3 - 10.6 mg/dL    Total Protein 8.0 6.4 - 8.2 g/dL    Albumin 3.7 3.4 - 5.0 g/dL    Albumin/Globulin Ratio 0.9 (L) 1.1 - 2.2    Total Bilirubin 1.2 (H) 0.0 - 1.0 mg/dL    Alkaline Phosphatase 182 (H) 40 - 129 U/L    ALT 11 10 - 40 U/L    AST 14 (L) 15 - 37 U/L   Lactic Acid   Result Value Ref Range    Lactic Acid 2.1 (H) 0.4 - 2.0 mmol/L   Urinalysis with Reflex to Culture    Specimen: Urine   Result Value Ref Range    Color, UA Yellow Straw/Yellow    Clarity, UA Clear Clear    Glucose, Ur >=1000 (A) Negative mg/dL    Bilirubin Urine Negative Negative    Ketones, Urine TRACE (A) Negative mg/dL    Specific Gravity, UA 1.052 1.005 - 1.030    Blood, Urine Negative Negative    pH, UA 6.5 5.0 - 8.0    Protein, UA Negative Negative mg/dL    Urobilinogen, Urine 1.0 <2.0 E.U./dL    Nitrite, Urine Negative Negative    Leukocyte Esterase, Urine Negative Negative    Microscopic Examination Not Indicated     Urine Type NotGiven     Urine Reflex to Culture Not Indicated        I spoke with Dr. Joseph Clifton. We thoroughly discussed the history, physical exam, laboratory and imaging studies, as well as, emergency department course. Based upon that discussion, we've decided to admit Raquel Emerson for further observation and evaluation of Yaquelin Coronado's epididymo orchitis, hyperglycemia, lactic acidosis. The patient tolerated their visit well. I evaluated the patient. The physician was available for consultation as needed. The patient and / or the family were informed of the results of any tests, a time was given to answer questions, a plan was proposed and they agreed with plan. CLINICAL IMPRESSION:  1. Epididymo-orchitis    2. Lactic acidosis    3. Type 2 diabetes mellitus with hyperglycemia, without long-term current use of insulin ([address]    4.  Failure of outpatient treatment        DISPOSITION Admitted 05/06/2022 04:09:32 PM      PATIENT REFERRED TO:  No

## 2022-05-06 NOTE — PROGRESS NOTES
Pharmacy Medication Reconciliation Note     List of medications patient is currently taking is complete. Source of information:   1. Patient   2. EMR    Notes regarding home medications:   1. Patient states he recently has been taking an antibiotic and a \"swelling medication\" for his infection. Does not know the name of either. Based on his Epic records, the antibiotic is likely ciprofloxacin 250 mg BID for 14 days which was prescribed on 4/13. Patient should be finished with his antibiotic course if taking as prescribed. Patient unsure of how many doses he has remaining. Patient also prescribed ibuprofen 600 mg PRN recently which is likely the medication for swelling. 2. Patient denies any other regular prescription or OTC home medications.     [id number], Pharmacy Intern  5/6/2022 4:09 PM

## 2022-05-06 NOTE — ED NOTES
Blood specimens and cultures collected by ED [facility].      Nusrat Kuo RN  05/06/22 6142

## 2022-05-06 NOTE — CONSULTS
Clinical Pharmacy Note  Vancomycin Consult    Chase Langley is a 44 y.o. male ordered Vancomycin for epididymo-orchitis; consult received from Dr. Bard Alfredo to manage therapy. Also receiving Zosyn. Patient Active Problem List   Diagnosis    Abdominal pain       Allergies:  Patient has no known allergies. Temp max:  Temp (24hrs), Av.6 °F (37 °C), Min:98.6 °F (37 °C), Max:98.6 °F (37 °C)      Recent Labs     22  1359   WBC 10.6       Recent Labs     22  1405   BUN 11   CREATININE 0.5*         Intake/Output Summary (Last 24 hours) at 2022 1803  Last data filed at 2022 1712  Gross per 24 hour   Intake 2611 ml   Output --   Net 2611 ml       Culture Results:  pending    Ht Readings from Last 1 Encounters:   22 5' 9\" (1.753 m)        Wt Readings from Last 1 Encounters:   22 173 lb 8 oz (78.7 kg)         Estimated Creatinine Clearance: 200 mL/min (A) (based on SCr of 0.5 mg/dL (L)). Assessment/Plan:  Patient received Vancomycin 1,500 mg IVPB x 1 in the ER. Vancomycin 1,000 mg IV every 8 hours ordered thereafter. Regimen projects a trough level of 15 ug/mL. Level ordered for 22 at 1700. Thank you for the consult. Will continue to follow.     Araceli Ruffin, [facility], PharmD, BCPS  2022 6:04 PM

## 2022-05-07 LAB
ANION GAP SERPL CALCULATED.3IONS-SCNC: 11 MMOL/L (ref 3–16)
BASOPHILS ABSOLUTE: 0 K/UL (ref 0–0.2)
BASOPHILS RELATIVE PERCENT: 0.3 %
BUN BLDV-MCNC: 8 MG/DL (ref 7–20)
C-REACTIVE PROTEIN: 104.4 MG/L (ref 0–5.1)
C. TRACHOMATIS DNA ,URINE: NEGATIVE
CALCIUM SERPL-MCNC: 8.7 MG/DL (ref 8.3–10.6)
CHLORIDE BLD-SCNC: 99 MMOL/L (ref 99–110)
CO2: 23 MMOL/L (ref 21–32)
CREAT SERPL-MCNC: <0.5 MG/DL (ref 0.9–1.3)
EOSINOPHILS ABSOLUTE: 0.1 K/UL (ref 0–0.6)
EOSINOPHILS RELATIVE PERCENT: 1.7 %
ESTIMATED AVERAGE GLUCOSE: 297.7 MG/DL
ESTIMATED AVERAGE GLUCOSE: 306.3 MG/DL
GFR AFRICAN AMERICAN: >60
GFR NON-AFRICAN AMERICAN: >60
GLUCOSE BLD-MCNC: 255 MG/DL (ref 70–99)
GLUCOSE BLD-MCNC: 256 MG/DL (ref 70–99)
GLUCOSE BLD-MCNC: 279 MG/DL (ref 70–99)
GLUCOSE BLD-MCNC: 285 MG/DL (ref 70–99)
GLUCOSE BLD-MCNC: 382 MG/DL (ref 70–99)
HBA1C MFR BLD: 12 %
HBA1C MFR BLD: 12.3 %
HCT VFR BLD CALC: 36.2 % (ref 40.5–52.5)
HEMOGLOBIN: 12.4 G/DL (ref 13.5–17.5)
HIV AG/AB: NORMAL
HIV ANTIGEN: NORMAL
HIV-1 ANTIBODY: NORMAL
HIV-2 AB: NORMAL
LYMPHOCYTES ABSOLUTE: 1.2 K/UL (ref 1–5.1)
LYMPHOCYTES RELATIVE PERCENT: 14.7 %
MCH RBC QN AUTO: 30.6 PG (ref 26–34)
MCHC RBC AUTO-ENTMCNC: 34.2 G/DL (ref 31–36)
MCV RBC AUTO: 89.4 FL (ref 80–100)
MONOCYTES ABSOLUTE: 0.8 K/UL (ref 0–1.3)
MONOCYTES RELATIVE PERCENT: 9.1 %
N. GONORRHOEAE DNA, URINE: NEGATIVE
NEUTROPHILS ABSOLUTE: 6.3 K/UL (ref 1.7–7.7)
NEUTROPHILS RELATIVE PERCENT: 74.2 %
PDW BLD-RTO: 12.3 % (ref 12.4–15.4)
PERFORMED ON: ABNORMAL
PLATELET # BLD: 253 K/UL (ref 135–450)
PMV BLD AUTO: 7.7 FL (ref 5–10.5)
POTASSIUM REFLEX MAGNESIUM: 3.9 MMOL/L (ref 3.5–5.1)
RBC # BLD: 4.05 M/UL (ref 4.2–5.9)
SEDIMENTATION RATE, ERYTHROCYTE: 84 MM/HR (ref 0–15)
SODIUM BLD-SCNC: 133 MMOL/L (ref 136–145)
TOTAL SYPHILLIS IGG/IGM: NORMAL
WBC # BLD: 8.4 K/UL (ref 4–11)

## 2022-05-07 PROCEDURE — 6370000000 HC RX 637 (ALT 250 FOR IP): Performed by: INTERNAL MEDICINE

## 2022-05-07 PROCEDURE — 6360000002 HC RX W HCPCS: Performed by: INTERNAL MEDICINE

## 2022-05-07 PROCEDURE — 99233 SBSQ HOSP IP/OBS HIGH 50: CPT | Performed by: INTERNAL MEDICINE

## 2022-05-07 PROCEDURE — 85652 RBC SED RATE AUTOMATED: CPT

## 2022-05-07 PROCEDURE — 6370000000 HC RX 637 (ALT 250 FOR IP): Performed by: NURSE PRACTITIONER

## 2022-05-07 PROCEDURE — 97161 PT EVAL LOW COMPLEX 20 MIN: CPT

## 2022-05-07 PROCEDURE — 2580000003 HC RX 258: Performed by: INTERNAL MEDICINE

## 2022-05-07 PROCEDURE — 80048 BASIC METABOLIC PNL TOTAL CA: CPT

## 2022-05-07 PROCEDURE — 87390 HIV-1 AG IA: CPT

## 2022-05-07 PROCEDURE — 83036 HEMOGLOBIN GLYCOSYLATED A1C: CPT

## 2022-05-07 PROCEDURE — 86701 HIV-1ANTIBODY: CPT

## 2022-05-07 PROCEDURE — 85025 COMPLETE CBC W/AUTO DIFF WBC: CPT

## 2022-05-07 PROCEDURE — 86140 C-REACTIVE PROTEIN: CPT

## 2022-05-07 PROCEDURE — 1200000000 HC SEMI PRIVATE

## 2022-05-07 PROCEDURE — 86780 TREPONEMA PALLIDUM: CPT

## 2022-05-07 PROCEDURE — 36415 COLL VENOUS BLD VENIPUNCTURE: CPT

## 2022-05-07 PROCEDURE — 94760 N-INVAS EAR/PLS OXIMETRY 1: CPT

## 2022-05-07 PROCEDURE — 97116 GAIT TRAINING THERAPY: CPT

## 2022-05-07 PROCEDURE — 86702 HIV-2 ANTIBODY: CPT

## 2022-05-07 RX ORDER — FLUCONAZOLE 2 MG/ML
200 INJECTION, SOLUTION INTRAVENOUS EVERY 24 HOURS
Status: COMPLETED | OUTPATIENT
Start: 2022-05-07 | End: 2022-05-13

## 2022-05-07 RX ORDER — INSULIN GLARGINE 100 [IU]/ML
0.25 INJECTION, SOLUTION SUBCUTANEOUS NIGHTLY
Status: DISCONTINUED | OUTPATIENT
Start: 2022-05-07 | End: 2022-05-14

## 2022-05-07 RX ADMIN — INSULIN GLARGINE 20 UNITS: 100 INJECTION, SOLUTION SUBCUTANEOUS at 20:59

## 2022-05-07 RX ADMIN — ACETAMINOPHEN 650 MG: 325 TABLET ORAL at 19:06

## 2022-05-07 RX ADMIN — INSULIN LISPRO 3 UNITS: 100 INJECTION, SOLUTION INTRAVENOUS; SUBCUTANEOUS at 20:58

## 2022-05-07 RX ADMIN — LINEZOLID 600 MG: 600 INJECTION, SOLUTION INTRAVENOUS at 08:35

## 2022-05-07 RX ADMIN — INSULIN LISPRO 6 UNITS: 100 INJECTION, SOLUTION INTRAVENOUS; SUBCUTANEOUS at 08:29

## 2022-05-07 RX ADMIN — LINEZOLID 600 MG: 600 INJECTION, SOLUTION INTRAVENOUS at 20:51

## 2022-05-07 RX ADMIN — FLUCONAZOLE 200 MG: 2 INJECTION, SOLUTION INTRAVENOUS at 00:47

## 2022-05-07 RX ADMIN — SODIUM CHLORIDE 20 ML: 9 INJECTION, SOLUTION INTRAVENOUS at 00:46

## 2022-05-07 RX ADMIN — ENOXAPARIN SODIUM 40 MG: 100 INJECTION SUBCUTANEOUS at 08:23

## 2022-05-07 RX ADMIN — OXYCODONE 5 MG: 5 TABLET ORAL at 09:49

## 2022-05-07 RX ADMIN — OXYCODONE 5 MG: 5 TABLET ORAL at 16:53

## 2022-05-07 RX ADMIN — INSULIN LISPRO 6 UNITS: 100 INJECTION, SOLUTION INTRAVENOUS; SUBCUTANEOUS at 17:05

## 2022-05-07 RX ADMIN — OXYCODONE 5 MG: 5 TABLET ORAL at 03:13

## 2022-05-07 RX ADMIN — INSULIN LISPRO 10 UNITS: 100 INJECTION, SOLUTION INTRAVENOUS; SUBCUTANEOUS at 12:30

## 2022-05-07 RX ADMIN — ACETAMINOPHEN 650 MG: 325 TABLET ORAL at 01:43

## 2022-05-07 RX ADMIN — PIPERACILLIN AND TAZOBACTAM 4500 MG: 4; .5 INJECTION, POWDER, LYOPHILIZED, FOR SOLUTION INTRAVENOUS at 02:46

## 2022-05-07 RX ADMIN — PIPERACILLIN AND TAZOBACTAM 4500 MG: 4; .5 INJECTION, POWDER, LYOPHILIZED, FOR SOLUTION INTRAVENOUS at 11:23

## 2022-05-07 RX ADMIN — PIPERACILLIN AND TAZOBACTAM 4500 MG: 4; .5 INJECTION, POWDER, LYOPHILIZED, FOR SOLUTION INTRAVENOUS at 19:14

## 2022-05-07 ASSESSMENT — PAIN - FUNCTIONAL ASSESSMENT
PAIN_FUNCTIONAL_ASSESSMENT: PREVENTS OR INTERFERES WITH MANY ACTIVE NOT PASSIVE ACTIVITIES
PAIN_FUNCTIONAL_ASSESSMENT: PREVENTS OR INTERFERES SOME ACTIVE ACTIVITIES AND ADLS

## 2022-05-07 ASSESSMENT — PAIN DESCRIPTION - ORIENTATION
ORIENTATION: RIGHT

## 2022-05-07 ASSESSMENT — PAIN DESCRIPTION - FREQUENCY
FREQUENCY: CONTINUOUS

## 2022-05-07 ASSESSMENT — PAIN SCALES - GENERAL
PAINLEVEL_OUTOF10: 9
PAINLEVEL_OUTOF10: 3
PAINLEVEL_OUTOF10: 10
PAINLEVEL_OUTOF10: 8
PAINLEVEL_OUTOF10: 6
PAINLEVEL_OUTOF10: 0
PAINLEVEL_OUTOF10: 0
PAINLEVEL_OUTOF10: 3

## 2022-05-07 ASSESSMENT — PAIN DESCRIPTION - DESCRIPTORS
DESCRIPTORS: ACHING

## 2022-05-07 ASSESSMENT — PAIN DESCRIPTION - PAIN TYPE
TYPE: ACUTE PAIN
TYPE: CHRONIC PAIN;ACUTE PAIN

## 2022-05-07 ASSESSMENT — PAIN DESCRIPTION - ONSET
ONSET: ON-GOING

## 2022-05-07 ASSESSMENT — PAIN DESCRIPTION - LOCATION
LOCATION: GROIN
LOCATION: GROIN;SCROTUM
LOCATION: GROIN

## 2022-05-07 NOTE — PROGRESS NOTES
Infectious Disease Follow up Notes  Admit Date: 5/6/2022  Hospital Day: 2    Antibiotics :   IV Zosyn   IV Fluconazole  IV Linezolid       CHIEF COMPLAINT:     Sepsis  Fever  Rt groin cellulitis abscess  Epididymo orchitis  DM poor control     Subjective interval History :  44 y.o. man with DM uncontrolled last HbA1C > 10 nearly 2 yrs ago and not been on meds due to lack of insurance was seen in ED on  3/18/22 for Dysuria and UA very abnormal and urine cx with Klebsiella noted was placed on Bactrim back then and had second visit to ED on 4/11/22 with Rt scrotal pain and swelling USG negative was placed on Doxycycline , was seen in ED hereon   4/13/22 was given IM Cetriaxone and added Flagyl and Cipro referred to Urology - was seen by . Urine ccx on that visit 25k of Klebsiella noted. He is now admitted with worsening pain Rt scrotal swelling and redness spreading up the Rt inguinal canal. He has no pain with urination but has severe scrotal pain. CT abd/pelvis on this admit with fluid in the inguinal canal with on going redness and concern for deep infection. 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 He is not insured not sure if he has taken the antibiotics ? ? Location : Rt groin pain, swelling and redness       Quality : aching, burning        Severity : 10/10    Duration :  4 weeks    Timing : constant   Context :  UTI and DM poor control     Modifying factors : none   Associated signs and symptoms: pain swelling local redness    Interval History : rt groin and scrotal pain on going and some improvement in the redness and tolerating IV abx ok BG still  High       Past Medical History:    Past Medical History:   Diagnosis Date    Diabetes mellitus ([facility] [address]        Past Surgical History:    History reviewed.  No pertinent surgical history. Current Medications:    Outpatient Medications Marked as Taking for the 5/6/22 encounter [facility] Encounter)   Medication Sig Dispense Refill    ciprofloxacin (CIPRO) 250 MG tablet Take 250 mg by mouth 2 times daily For 14 days      ibuprofen (ADVIL;MOTRIN) 600 MG tablet Take 600 mg by mouth every 6 hours as needed for Pain         Allergies:  Patient has no known allergies. Immunizations : There is no immunization history on file for this patient. Social History:    Social History     Tobacco Use    Smoking status: Never Smoker    Smokeless tobacco: Never Used   Substance Use Topics    Alcohol use: No    Drug use: No     Social History     Tobacco Use   Smoking Status Never Smoker   Smokeless Tobacco Never Used      Family History : no DVT no COPD       REVIEW OF SYSTEMS:     Constitutional: fevers + , chillS+  night sweats  Eyes:  negative for blurred vision, eye discharge, visual disturbance   HEENT:  negative for hearing loss, ear drainage,nasal congestion  Respiratory:  negative for cough, shortness of breath or hemoptysis   Cardiovascular:  negative for chest pain, palpitations, syncope  Gastrointestinal:  negative for nausea, vomiting, diarrhea, constipation, abdominal pain  Genitourinary:  negative for frequency, dysuria, urinary incontinence, hematuria SCROTAL PAIN + SWELLING+   Hematologic/Lymphatic:  negative for easy bruising, bleeding and lymphadenopathy  Allergic/Immunologic:  negative for recurrent infections, angioedema, anaphylaxis   Endocrine:  negative for weight changes, polyuria, polydipsia and polyphagia  Musculoskeletal:  negative for joint  pain, swelling, decreased range of motion  Integumentary: No rashes, skin lesions  Neurological:  negative for headaches, slurred speech, unilateral weakness  Psychiatric: negative for hallucinations,confusion,agitation.                 PHYSICAL EXAM:      Vitals:    /70   Pulse 76   Temp 98.9 °F (37.2 °C) (Oral) Resp 18   Ht 5' 9\" (1.753 m)   Wt 175 lb 11.3 oz (79.7 kg)   SpO2 96%   BMI 25.95 kg/m²     General Appearance: alert,in some  acute distress, no pallor, no icterus   Skin: warm and dry, no rash or erythema  Head: normocephalic and atraumatic  Eyes: pupils equal, round, and reactive to light, conjunctivae normal  ENT: tympanic membrane, external ear and ear canal normal bilaterally, nose without deformity, nasal mucosa and turbinates normal without polyps  Neck: supple and non-tender without mass, no thyromegaly  no cervical lymphadenopathy  Pulmonary/Chest: clear to auscultation bilaterally- no wheezes, rales or rhonchi, normal air movement, no respiratory distress  Cardiovascular: normal rate, regular rhythm, normal S1 and S2, no murmurs, rubs, clicks, or gallops, no carotid bruits  Abdomen: soft, non-tender, non-distended, normal bowel sounds, no masses or organomegaly  Extremities: no cyanosis, clubbing or edema  Musculoskeletal: normal range of motion, no joint swelling, deformity or tenderness  Integumentary: No rashes, no abnormal skin lesions, no petechiae  Neurologic: reflexes normal and symmetric, no cranial nerve deficit  Psych:  Orientation, sensorium, mood normal            Lines:IV  Rt groin inguinal area on going swelling local pain redness some improvement with cellulitis and Rt scrotal swelling and pain no skin changes         Data Review:    CBC:   Lab Results   Component Value Date    WBC 8.4 05/07/2022    HGB 12.4 (L) 05/07/2022    HCT 36.2 (L) 05/07/2022    MCV 89.4 05/07/2022     05/07/2022     RENAL:   Lab Results   Component Value Date    CREATININE <0.5 (L) 05/07/2022    BUN 8 05/07/2022     (L) 05/07/2022    K 3.9 05/07/2022    CL 99 05/07/2022    CO2 23 05/07/2022     SED RATE:   Lab Results   Component Value Date    SEDRATE 84 05/07/2022     CK: No results found for: CKTOTAL  CRP:   Lab Results   Component Value Date    .4 05/07/2022     Hepatic Function Panel: SUSCEPTIBILITY PANEL BY KRISTI       ciprofloxacin Sensitive <=1 mcg/mL BACTERIAL SUSCEPTIBILITY PANEL BY KRISTI       ertapenem Sensitive <=0.5 mcg/mL BACTERIAL SUSCEPTIBILITY PANEL BY KRISTI       gentamicin Sensitive <=4 mcg/mL BACTERIAL SUSCEPTIBILITY PANEL BY KRISTI       meropenem Sensitive <=1 mcg/mL BACTERIAL SUSCEPTIBILITY PANEL BY KRISTI       nitrofurantoin Resistant >64 mcg/mL BACTERIAL SUSCEPTIBILITY PANEL BY KRISTI       piperacillin-tazobactam Sensitive <=16 mcg/mL BACTERIAL SUSCEPTIBILITY PANEL BY KRISTI       trimethoprim-sulfamethoxazole Sensitive <=2/38 mcg/mL BACTERIAL SUSCEPTIBILITY PANEL BY KRISTI             Narrative  Performed by: [address] [place] [address] Lab  ORDER#: [id number]                          ORDERED BY: Crystal Miller   SOURCE: Urine Clean Catch                  COLLECTED:  04/13/22                Procedure Component Value Units Date/Time   Culture, Blood 2 [[id number]] Collected: 05/06/22 1359   Order Status: Sent Specimen: Blood Updated: 05/06/22 1420   Culture, Blood 1 [[id number]] Collected: 05/06/22 1359   Order Status: Sent Specimen: Blood Updated: 05/06/22 1420       Respiratory Culture:  No results found for: Kristina Pore  AFB:No results found for: AFBSMEAR  Viral Culture:  No results found for: COVID19  Urine Culture: No results for input(s): Staci Deirdre in the last 72 hours. IMAGING:    CT ABDOMEN PELVIS W IV CONTRAST Additional Contrast? None   Final Result   Worsening right epididymal orchitis as described above with a small fluid   collection in the pelvis as measured above.              CT ABDOMEN PELVIS W IV CONTRAST Additional Contrast? None   Final Result   Worsening right epididymal orchitis as described above with a small fluid   collection in the pelvis as measured above.              USG sCROTUM :  4/13/22     Impression   Findings are most suggestive of right epididymo-orchitis.  Clinical follow-up   to resolution is recommended.       Flow was seen within each testicle, arguing against acute torsion.       3 mm left epididymal cyst or spermatocele.       RECOMMENDATIONS:   Unavailable             All the pertinent images and reports for the current Hospitalization were reviewed by me     Scheduled Meds:   fluconazole  200 mg IntraVENous Q24H    sodium chloride flush  10 mL IntraVENous 2 times per day    enoxaparin  40 mg SubCUTAneous Daily    piperacillin-tazobactam  4,500 mg IntraVENous Q8H    insulin glargine  0.2 Units/kg SubCUTAneous Nightly    insulin lispro  0-12 Units SubCUTAneous TID WC    insulin lispro  0-6 Units SubCUTAneous Nightly    linezolid  600 mg IntraVENous Q12H       Continuous Infusions:   sodium chloride 20 mL (05/07/22 0046)    dextrose      sodium chloride 75 mL/hr at 05/06/22 9228       PRN Meds:  sodium chloride flush, sodium chloride, potassium chloride **OR** potassium alternative oral replacement **OR** potassium chloride, potassium chloride, magnesium sulfate, promethazine **OR** ondansetron, magnesium hydroxide, acetaminophen **OR** acetaminophen, glucose, dextrose, glucagon (rDNA), dextrose, oxyCODONE      Assessment:     Patient Active Problem List   Diagnosis    Abdominal pain       Sepsis  Fevers   WBC normal from partial treatment   Rt inguinal fluid collection concern for abscess on the CT  Rt epididymo orchitis with extension into inguinal canal  UTI recent with Klebsiella pneumoniae partially treated  DM poor control ( NOT TAKING MEDS > 2 YR due to lack of INSURANCE)  Remote HISTORY of Gonorrhea  CT abd/pelvis with worsening of Orchitis compared to before        UNfortunately has not responded to out patient treatment as expected despite appropriate course of abx makes me wonder if he has not Taken his MEDS  Due to Lack of INSURANCE or due to Uncontrolled DM he is having complications     As this is some what unusual if Klebsiella is the Culprit Organism was sensitive to Bactrim, [facility], Microbiology, Radiology and all the pertinent results from current hospitalization and  care every where were reviewed  by me as a part of the evaluation   Plan:   1. IV Zosyn x 4.5 gm Q 8hrs HIGH dose   2. Cont   IV Linezolid x 600 mg q 12 hrs  3. Cont  IV Fluconazole x 400 mg   4. Control DM    5. HbA1C very elevated    6. bLOOD CX in process   7. HIV -ve, Hepatitis, Gonorrhea -ve, Chlamydia -ve, Syphilis screen   8. ESR. CRP  9. Social service assistance for antibiotic help  10. If not improving on IV abx will need surgery - UROLOGY following      Discussed with patient/Family and Nursing   Risk of Complications/Morbidity: High      · Illness(es)/ Infection present that pose threat to bodily function. · There is potential for severe exacerbation of infection/side effects of treatment. · Therapy requires intensive monitoring for antimicrobial agent toxicity        Discussed with patient/Family and Nursing staff     Thanks for allowing me to participate in your patient's care and please call me with any questions or concerns.     Baltazar Wooten MD  Infectious Disease  [facility]) Physician  Phone: [phone]   Fax : [phone]

## 2022-05-07 NOTE — PROGRESS NOTES
Romulo Thayer    OT orders received and chart reviewed. Per PT, Pt is UAL and functioning at baseline. No OT needs. Will sign off.     Electronically signed by Jeff Holder OT on 5/7/22 at 2:04 PM EDT

## 2022-05-07 NOTE — PROGRESS NOTES
Patient alert and oriented x4 in bed watching TV.  around lunch time Dr Te Marquez increased lantus to 20 units at bedtime. Pt resting in bed ,denies pain and call within reach,all safety precautions in place. restriction diet to 4 carb choices. Pt able to ambulate to the bathroom with assistance. we will Continuous monitoring the groin area.

## 2022-05-07 NOTE — PROGRESS NOTES
Physical Therapy  Facility/Department: [facility] SURG  Physical Therapy Initial Assessment    Name: Cat Conde  : 1985  MRN: [id number]  Date of Service: 2022    Discharge Recommendations:  Home independently,No therapy recommended at discharge   PT Equipment Recommendations  Equipment Needed: No    Cat Conde scored a 24/24 on the AM-PAC short mobility form. At this time, no further PT is recommended upon discharge due to pt is safe for home mobility. Recommend patient returns to prior setting with prior services. Patient Diagnosis(es): The primary encounter diagnosis was Epididymo-orchitis. Diagnoses of Lactic acidosis, Type 2 diabetes mellitus with hyperglycemia, without long-term current use of insulin ([facility] [address] and Failure of outpatient treatment were also pertinent to this visit. Past Medical History:  has a past medical history of Diabetes mellitus ([facility] [address] Past Surgical History:  has no past surgical history on file. Assessment   Assessment: Pt I with room mobility and can walk community distances I but with pain. No further PT needed. Decision Making: Low Complexity  History: see below  Exam: see below  Clinical Presentation: evolving  Barriers to Learning: none noted  Activity Tolerance  Activity Tolerance: Patient tolerated evaluation without incident     Plan   Plan  Plan: Discharge with evaluation only  Safety Devices  Type of Devices: Left in bed,Nurse notified     Restrictions        Subjective   General  Chart Reviewed: Yes  Additional Pertinent Hx: This is a  44 y.o. male who presents to the emergency department with complaints of right testicular pain and swelling onset 4 weeks ago. Reviewed prior records: Seen at [facility] ED on , discharged with Doxy Vicodin, Zofran. Then seen in this ED on , ultrasound was most suggestive of a right epididymoorchitis. He was given Rocephin 500 mg IM and discharged on Cipro.  He was then seen by Dr Jase Castaneda, urologist, who started him on Levofloxacin. He saw his urologist again today, who sent him to the ED for concern of worsening infection. Response To Previous Treatment: Not applicable  Family / Caregiver Present: No  Referring Practitioner: Vicente  Referral Date : 05/06/22  Diagnosis: epididymo-orchitis  Follows Commands: Within Functional Limits  General Comment  Comments: pain R groin for about 4 weeks, worsening  Subjective  Subjective: willing to participate in therapy, reports not difficulty with room mobility other than going slowly due to pain         Social/Functional History  Social/Functional History  Lives With: Alone  Type of Home: Apartment  Home Layout: One level  Home Access: Stairs to enter with rails  Entrance Stairs - Number of Steps: 5  Has the patient had two or more falls in the past year or any fall with injury in the past year?: No  ADL Assistance: Independent  Homemaking Assistance: Independent  Homemaking Responsibilities: Yes  Ambulation Assistance: Independent  Transfer Assistance: Independent  Type of Occupation: works in construction, hasn;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  Vision/Hearing  Hearing: Within functional limits    Cognition   Orientation  Overall Orientation Status: Within Normal Limits     Objective   Pulse: 78  Heart Rate Source: Monitor  BP: 111/68  BP Location: Left Arm  MAP (Calculated): 82.33  Resp: 16  SpO2: 96 %  O2 Device: None (Room air)   AROM RLE (degrees)  RLE AROM: WNL  AROM LLE (degrees)  LLE AROM : WNL  Strength RLE  Strength RLE: WFL  Strength LLE  Strength LLE: WFL   Bed mobility  Rolling to Right: Independent  Supine to Sit: Independent  Transfers  Sit to Stand: Independent  Stand to sit:  Independent  Ambulation  Surface: level tile  Device: No Device  Assistance: Independent  Quality of Gait: even pattern, slower pace, no LOB  Gait Deviations: None  Distance: 450' tile, carpet, 2 surface transitions, multiple turns     Balance  Posture: Good  Sitting - Static: Good  Sitting - Dynamic: Good  Standing - Static: Good  Standing - Dynamic: Good  Comments: put pajama pants on standing up, able to stand on one foot while threading opposite leg through pants         AM-PAC Score  AM-PAC Inpatient Mobility Raw Score : 24 (05/07/22 0855)  AM-PAC Inpatient T-Scale Score : 61.14 (05/07/22 0855)  Mobility Inpatient CMS 0-100% Score: 0 (05/07/22 0855)  Mobility Inpatient CMS G-Code Modifier : [facility] (05/07/22 9523)   Goals  Short Term Goals  Time Frame for Short term goals: no PT goals  Patient Goals   Patient goals : feel better, no pain   Therapy Time   Individual Concurrent Group Co-treatment   Time In 0830         Time Out 0855         Minutes 25         Timed Code Treatment Minutes: 25 Minutes   charges = 15 min eval 10 min gt  Michael Nichols, [address]

## 2022-05-07 NOTE — PROGRESS NOTES
[facility]: [id number]  Date of Birth 1985   Today's Date: 5/7/2022      Subjective: Stable, no acute events overnight. Feels swelling and discomfort is slightly better over the past day with IV antibiotics. ROS: Constitutional: No fever    Vitals:  Vitals:    05/07/22 0447 05/07/22 0559 05/07/22 1047 05/07/22 1205   BP: 111/68  132/77    Pulse: 78  88    Resp: 16  18    Temp: 99.1 °F (37.3 °C)  99.6 °F (37.6 °C)    TempSrc: Oral  Oral    SpO2: 96%  96% 96%   Weight:  175 lb 11.3 oz (79.7 kg)     Height:         I/O last 3 completed shifts: In: 2611 [IV Piggyback:2611]  Out: 200 [Urine:200]    Exam:  General: Awake, oriented, no acute distress  Respiratory: Nonlabored breathing  Abdomen: Soft, non-tender, non-distended, no masses  : Right testicle swelling slightly improved.   Spermatic cord remains significantly enlarged and indurated  Skin: Skin color, texture, turgor normal, no rashes or lesions  Neurologic: no gross deficits    CURRENT MEDICATIONS   Scheduled Meds:   fluconazole  200 mg IntraVENous Q24H    sodium chloride flush  10 mL IntraVENous 2 times per day    enoxaparin  40 mg SubCUTAneous Daily    piperacillin-tazobactam  4,500 mg IntraVENous Q8H    insulin glargine  0.2 Units/kg SubCUTAneous Nightly    insulin lispro  0-12 Units SubCUTAneous TID WC    insulin lispro  0-6 Units SubCUTAneous Nightly    linezolid  600 mg IntraVENous Q12H     Continuous Infusions:   sodium chloride 20 mL (05/07/22 0046)    dextrose      sodium chloride 75 mL/hr at 05/06/22 1858     PRN Meds:.sodium chloride flush, sodium chloride, potassium chloride **OR** potassium alternative oral replacement **OR** potassium chloride, potassium chloride, magnesium sulfate, promethazine **OR** ondansetron, magnesium hydroxide, acetaminophen **OR** acetaminophen, glucose, dextrose, glucagon (rDNA), dextrose, oxyCODONE    LABS     Recent Labs     05/06/22  6335 05/06/22  1405 05/07/22  0748   WBC 10.6  --  8.4   HGB 14.8  --  12.4*   HCT 42.3  --  36.2*     --  253   NA  --  126* 133*   K  --  4.0 3.9   CL  --  91* 99   CO2  --  25 23   BUN  --  11 8   CREATININE  --  0.5* <0.5*   CALCIUM  --  9.3 8.7   AST  --  14*  --    ALT  --  11  --    BILITOT  --  1.2*  --            ASSESSMENT   1. Hospital day # 1  2. Severe right epididymal orchitis. Failed outpatient management with oral antibiotics. 3. Poorly controlled diabetes  PLAN   1. Appreciate ID input regarding antibiotic management  2. Plan serial monitoring over the next 48 to 72 hours to assess response with IV antibiotics.   3. If not not improving may need to pursue right-sided orchiectomy     Anton Serna MD 5/7/2022 12:38 PM

## 2022-05-07 NOTE — PROGRESS NOTES
Hospitalist Progress Note      PCP: No primary care provider on file. Chief Complaint. Patient is a 78-year-old male with remote past medical history of diabetes mellitus but not taking any medications who presents to the hospital for right scrotum pain and groin pain. According to the patient it can go up to 10/10 intensity, radiates to right groin, associated with swelling in his groin, he mentions he has only 1 sexual partner, his partner does not have any STD at this time, he denies previous history of STDs, he is not sure of any exposure to HIV. Patient denied fevers chills chest pain nausea vomiting diarrhea dysuria. Patient mentions he was recently started on oral antibiotics as outpatient but the swelling has been worsening so he decided to come to the hospital at the request of urology. Date of Admission: 5/6/2022    Subjective: Mentions his testicle swelling has little improved, denies chest pain, nausea, vomiting, shortness of breath, fever or chills.  mention feels overall better    Medications:  Reviewed    Infusion Medications    sodium chloride 20 mL (05/07/22 0046)    dextrose      sodium chloride 75 mL/hr at 05/06/22 1858     Scheduled Medications    fluconazole  200 mg IntraVENous Q24H    sodium chloride flush  10 mL IntraVENous 2 times per day    enoxaparin  40 mg SubCUTAneous Daily    piperacillin-tazobactam  4,500 mg IntraVENous Q8H    insulin glargine  0.2 Units/kg SubCUTAneous Nightly    insulin lispro  0-12 Units SubCUTAneous TID WC    insulin lispro  0-6 Units SubCUTAneous Nightly    linezolid  600 mg IntraVENous Q12H     PRN Meds: sodium chloride flush, sodium chloride, potassium chloride **OR** potassium alternative oral replacement **OR** potassium chloride, potassium chloride, magnesium sulfate, promethazine **OR** ondansetron, magnesium hydroxide, acetaminophen **OR** acetaminophen, glucose, dextrose, glucagon (rDNA), dextrose, oxyCODONE      Intake/Output Summary (Last 24 hours) at 5/7/2022 1601  Last data filed at 5/7/2022 1318  Gross per 24 hour   Intake 3091 ml   Output 200 ml   Net 2891 ml       Physical Exam Performed:    /70   Pulse 76   Temp 98.9 °F (37.2 °C) (Oral)   Resp 18   Ht 5' 9\" (1.753 m)   Wt 175 lb 11.3 oz (79.7 kg)   SpO2 96%   BMI 25.95 kg/m²     General appearance: No apparent distress  HEENT:  Conjunctivae/corneas clear. Neck: Supple, with full range of motion. Respiratory:  Normal respiratory effort. Clear to auscultation, bilaterally without Rales/Wheezes/Rhonchi. Cardiovascular: Regular rate and rhythm with normal S1/S2 without murmurs or rubs  Abdomen: Soft, non-tender, non-distended, normal bowel sounds. Musculoskeletal: No cyanosis or edema bilaterally  Neurologic:  without any focal sensory/motor deficits. grossly non-focal.  Psychiatric: Alert and oriented, Normal mood  Peripheral Pulses: +2 palpable, equal bilaterally       Labs:   Recent Labs     05/06/22  1359 05/07/22  0748   WBC 10.6 8.4   HGB 14.8 12.4*   HCT 42.3 36.2*    253     Recent Labs     05/06/22  1405 05/07/22  0748   * 133*   K 4.0 3.9   CL 91* 99   CO2 25 23   BUN 11 8   CREATININE 0.5* <0.5*   CALCIUM 9.3 8.7     Recent Labs     05/06/22  1405   AST 14*   ALT 11   BILITOT 1.2*   ALKPHOS 182*     No results for input(s): INR in the last 72 hours. No results for input(s): Jeremi Oneil in the last 72 hours. Urinalysis:      Lab Results   Component Value Date    NITRU Negative 05/06/2022    WBCUA 397 04/13/2022    BACTERIA Rare 02/27/2014    RBCUA 20 04/13/2022    BLOODU Negative 05/06/2022    SPECGRAV 1.052 05/06/2022    GLUCOSEU >=1000 05/06/2022       Radiology:  CT ABDOMEN PELVIS W IV CONTRAST Additional Contrast? None   Final Result   Worsening right epididymal orchitis as described above with a small fluid   collection in the pelvis as measured above.                Assessment/Plan:    Active Hospital Problems    Diagnosis     Abdominal pain [R10.9]      Priority: Medium     Patient is a 54-year-old male with remote past medical history of diabetes mellitus but not taking any medications who presents to the hospital for right scrotum pain and groin pain. According to the patient it can go up to 10/10 intensity, radiates to right groin, associated with swelling in his groin, he mentions he has only 1 sexual partner, his partner does not have any STD at this time, he denies previous history of STDs, he is not sure of any exposure to HIV. Patient denied fevers chills chest pain nausea vomiting diarrhea dysuria. Patient mentions he was recently started on oral antibiotics as outpatient but the swelling has been worsening so he decided to come to the hospital at the request of urology.     Assessment  Worsening right epididymal orchitis  history of diabetes mellitus, medication nonadherence  Hyponatremia  Lactic acidosis  Sepsis present on admission likely secondary to #1        Plan  Infectious disease consulted, antibiotics per ID, patient is a started on Zosyn, linezolid  Check blood cultures-negative to date, urine culture-negative lactate  Urology following, no surgical plan at this time, if fails to improve on antibiotics plan for right orchiectomy  Continue IV fluid therapy with normal saline  Start weight-based long-acting insulin, insulin sliding scale, increase long-acting insulin, A1c is 12  Diet: ADULT DIET;  Regular; 4 carb choices (60 gm/meal)  Code Status: Full Code    PT/OT Eval Status: ordered    Dispo -continue IV antibiotics per ID, continue to monitor inpatient    Marychuy Delgado MD

## 2022-05-07 NOTE — PLAN OF CARE
Problem: Discharge Planning  Goal: Discharge to home or other facility with appropriate resources  Outcome: Progressing  Flowsheets (Taken 5/6/2022 1700 by Heather Carrasquillo RN)  Discharge to home or other facility with appropriate resources: Identify barriers to discharge with patient and caregiver     Problem: Pain  Goal: Verbalizes/displays adequate comfort level or baseline comfort level  Outcome: Progressing     Problem: Safety - Adult  Goal: Free from fall injury  Outcome: Progressing     Problem: Skin/Tissue Integrity  Goal: Absence of new skin breakdown  Description: 1. Monitor for areas of redness and/or skin breakdown  2. Assess vascular access sites hourly  3. Every 4-6 hours minimum:  Change oxygen saturation probe site  4. Every 4-6 hours:  If on nasal continuous positive airway pressure, respiratory therapy assess nares and determine need for appliance change or resting period.   Outcome: Progressing     Problem: ABCDS Injury Assessment  Goal: Absence of physical injury  Outcome: Progressing

## 2022-05-07 NOTE — PROGRESS NOTES
Patient alert and oriented x4 awake and in bed, C/O of pain Rt groin , oxycodone 5 mg PO  administered as prescribed Pt tolerated well, bed in lower position and call light within reach.

## 2022-05-08 LAB
ANION GAP SERPL CALCULATED.3IONS-SCNC: 12 MMOL/L (ref 3–16)
BUN BLDV-MCNC: 8 MG/DL (ref 7–20)
CALCIUM SERPL-MCNC: 8.9 MG/DL (ref 8.3–10.6)
CHLORIDE BLD-SCNC: 100 MMOL/L (ref 99–110)
CO2: 23 MMOL/L (ref 21–32)
CREAT SERPL-MCNC: <0.5 MG/DL (ref 0.9–1.3)
GFR AFRICAN AMERICAN: >60
GFR NON-AFRICAN AMERICAN: >60
GLUCOSE BLD-MCNC: 189 MG/DL (ref 70–99)
GLUCOSE BLD-MCNC: 230 MG/DL (ref 70–99)
GLUCOSE BLD-MCNC: 231 MG/DL (ref 70–99)
GLUCOSE BLD-MCNC: 252 MG/DL (ref 70–99)
GLUCOSE BLD-MCNC: 285 MG/DL (ref 70–99)
PERFORMED ON: ABNORMAL
POTASSIUM REFLEX MAGNESIUM: 4 MMOL/L (ref 3.5–5.1)
SODIUM BLD-SCNC: 135 MMOL/L (ref 136–145)
URINE CULTURE, ROUTINE: NORMAL

## 2022-05-08 PROCEDURE — 6370000000 HC RX 637 (ALT 250 FOR IP): Performed by: NURSE PRACTITIONER

## 2022-05-08 PROCEDURE — 6370000000 HC RX 637 (ALT 250 FOR IP): Performed by: INTERNAL MEDICINE

## 2022-05-08 PROCEDURE — 6360000002 HC RX W HCPCS: Performed by: INTERNAL MEDICINE

## 2022-05-08 PROCEDURE — 1200000000 HC SEMI PRIVATE

## 2022-05-08 PROCEDURE — 36415 COLL VENOUS BLD VENIPUNCTURE: CPT

## 2022-05-08 PROCEDURE — 2580000003 HC RX 258: Performed by: INTERNAL MEDICINE

## 2022-05-08 PROCEDURE — 6360000002 HC RX W HCPCS: Performed by: NURSE PRACTITIONER

## 2022-05-08 PROCEDURE — 94760 N-INVAS EAR/PLS OXIMETRY 1: CPT

## 2022-05-08 PROCEDURE — 80048 BASIC METABOLIC PNL TOTAL CA: CPT

## 2022-05-08 RX ORDER — MORPHINE SULFATE 4 MG/ML
4 INJECTION, SOLUTION INTRAMUSCULAR; INTRAVENOUS ONCE
Status: COMPLETED | OUTPATIENT
Start: 2022-05-08 | End: 2022-05-08

## 2022-05-08 RX ORDER — IBUPROFEN 400 MG/1
400 TABLET ORAL EVERY 6 HOURS PRN
Status: DISCONTINUED | OUTPATIENT
Start: 2022-05-08 | End: 2022-05-18 | Stop reason: HOSPADM

## 2022-05-08 RX ADMIN — SODIUM CHLORIDE, PRESERVATIVE FREE 10 ML: 5 INJECTION INTRAVENOUS at 08:25

## 2022-05-08 RX ADMIN — PIPERACILLIN AND TAZOBACTAM 4500 MG: 4; .5 INJECTION, POWDER, LYOPHILIZED, FOR SOLUTION INTRAVENOUS at 02:42

## 2022-05-08 RX ADMIN — FLUCONAZOLE 200 MG: 2 INJECTION, SOLUTION INTRAVENOUS at 23:39

## 2022-05-08 RX ADMIN — OXYCODONE 5 MG: 5 TABLET ORAL at 21:28

## 2022-05-08 RX ADMIN — SODIUM CHLORIDE: 9 INJECTION, SOLUTION INTRAVENOUS at 18:47

## 2022-05-08 RX ADMIN — INSULIN GLARGINE 20 UNITS: 100 INJECTION, SOLUTION SUBCUTANEOUS at 21:31

## 2022-05-08 RX ADMIN — ACETAMINOPHEN 650 MG: 325 TABLET ORAL at 01:57

## 2022-05-08 RX ADMIN — LINEZOLID 600 MG: 600 INJECTION, SOLUTION INTRAVENOUS at 21:31

## 2022-05-08 RX ADMIN — IBUPROFEN 400 MG: 400 TABLET ORAL at 16:53

## 2022-05-08 RX ADMIN — IBUPROFEN 400 MG: 400 TABLET ORAL at 23:40

## 2022-05-08 RX ADMIN — INSULIN LISPRO 2 UNITS: 100 INJECTION, SOLUTION INTRAVENOUS; SUBCUTANEOUS at 08:26

## 2022-05-08 RX ADMIN — LINEZOLID 600 MG: 600 INJECTION, SOLUTION INTRAVENOUS at 08:25

## 2022-05-08 RX ADMIN — MORPHINE SULFATE 4 MG: 4 INJECTION, SOLUTION INTRAMUSCULAR; INTRAVENOUS at 02:35

## 2022-05-08 RX ADMIN — IBUPROFEN 400 MG: 400 TABLET ORAL at 11:00

## 2022-05-08 RX ADMIN — OXYCODONE 5 MG: 5 TABLET ORAL at 08:23

## 2022-05-08 RX ADMIN — IBUPROFEN 400 MG: 400 TABLET ORAL at 02:39

## 2022-05-08 RX ADMIN — INSULIN LISPRO 4 UNITS: 100 INJECTION, SOLUTION INTRAVENOUS; SUBCUTANEOUS at 16:58

## 2022-05-08 RX ADMIN — ENOXAPARIN SODIUM 40 MG: 100 INJECTION SUBCUTANEOUS at 08:22

## 2022-05-08 RX ADMIN — INSULIN LISPRO 3 UNITS: 100 INJECTION, SOLUTION INTRAVENOUS; SUBCUTANEOUS at 21:34

## 2022-05-08 RX ADMIN — OXYCODONE 5 MG: 5 TABLET ORAL at 15:18

## 2022-05-08 RX ADMIN — PIPERACILLIN AND TAZOBACTAM 4500 MG: 4; .5 INJECTION, POWDER, LYOPHILIZED, FOR SOLUTION INTRAVENOUS at 18:48

## 2022-05-08 RX ADMIN — INSULIN LISPRO 6 UNITS: 100 INJECTION, SOLUTION INTRAVENOUS; SUBCUTANEOUS at 12:09

## 2022-05-08 RX ADMIN — PIPERACILLIN AND TAZOBACTAM 4500 MG: 4; .5 INJECTION, POWDER, LYOPHILIZED, FOR SOLUTION INTRAVENOUS at 10:59

## 2022-05-08 RX ADMIN — OXYCODONE 5 MG: 5 TABLET ORAL at 01:57

## 2022-05-08 RX ADMIN — SODIUM CHLORIDE: 9 INJECTION, SOLUTION INTRAVENOUS at 02:03

## 2022-05-08 RX ADMIN — FLUCONAZOLE 200 MG: 2 INJECTION, SOLUTION INTRAVENOUS at 02:44

## 2022-05-08 ASSESSMENT — PAIN SCALES - GENERAL
PAINLEVEL_OUTOF10: 0
PAINLEVEL_OUTOF10: 5
PAINLEVEL_OUTOF10: 8
PAINLEVEL_OUTOF10: 6
PAINLEVEL_OUTOF10: 0
PAINLEVEL_OUTOF10: 0
PAINLEVEL_OUTOF10: 10
PAINLEVEL_OUTOF10: 3
PAINLEVEL_OUTOF10: 8
PAINLEVEL_OUTOF10: 4
PAINLEVEL_OUTOF10: 6

## 2022-05-08 ASSESSMENT — PAIN DESCRIPTION - LOCATION
LOCATION: SCROTUM

## 2022-05-08 ASSESSMENT — PAIN DESCRIPTION - DESCRIPTORS
DESCRIPTORS: DISCOMFORT;SHARP
DESCRIPTORS: ACHING;SORE;DISCOMFORT
DESCRIPTORS: ACHING;SHARP;SHOOTING;SORE
DESCRIPTORS: DISCOMFORT

## 2022-05-08 ASSESSMENT — PAIN DESCRIPTION - ORIENTATION
ORIENTATION: RIGHT

## 2022-05-08 ASSESSMENT — PAIN - FUNCTIONAL ASSESSMENT
PAIN_FUNCTIONAL_ASSESSMENT: ACTIVITIES ARE NOT PREVENTED
PAIN_FUNCTIONAL_ASSESSMENT: PREVENTS OR INTERFERES SOME ACTIVE ACTIVITIES AND ADLS

## 2022-05-08 ASSESSMENT — PAIN DESCRIPTION - PAIN TYPE: TYPE: ACUTE PAIN

## 2022-05-08 ASSESSMENT — PAIN DESCRIPTION - ONSET: ONSET: ON-GOING

## 2022-05-08 ASSESSMENT — PAIN DESCRIPTION - FREQUENCY: FREQUENCY: CONTINUOUS

## 2022-05-08 ASSESSMENT — PAIN SCALES - WONG BAKER
WONGBAKER_NUMERICALRESPONSE: 0

## 2022-05-08 NOTE — PROGRESS NOTES
Hospitalist Progress Note      PCP: No primary care provider on file. Chief Complaint. Patient is a 18-year-old male with remote past medical history of diabetes mellitus but not taking any medications who presents to the hospital for right scrotum pain and groin pain. According to the patient it can go up to 10/10 intensity, radiates to right groin, associated with swelling in his groin, he mentions he has only 1 sexual partner, his partner does not have any STD at this time, he denies previous history of STDs, he is not sure of any exposure to HIV. Patient denied fevers chills chest pain nausea vomiting diarrhea dysuria. Patient mentions he was recently started on oral antibiotics as outpatient but the swelling has been worsening so he decided to come to the hospital at the request of urology.     Date of Admission: 5/6/2022    Subjective: Mentions his testicle swelling has little improved but nothing significant, he mentions pain is better, denies chest pain, nausea, vomiting, shortness of breath, fever or chills    Medications:  Reviewed    Infusion Medications    sodium chloride 5 mL/hr at 05/08/22 0648    dextrose      sodium chloride 75 mL/hr at 05/08/22 0987     Scheduled Medications    fluconazole  200 mg IntraVENous Q24H    insulin glargine  0.25 Units/kg SubCUTAneous Nightly    sodium chloride flush  10 mL IntraVENous 2 times per day    enoxaparin  40 mg SubCUTAneous Daily    piperacillin-tazobactam  4,500 mg IntraVENous Q8H    insulin lispro  0-12 Units SubCUTAneous TID WC    insulin lispro  0-6 Units SubCUTAneous Nightly    linezolid  600 mg IntraVENous Q12H     PRN Meds: ibuprofen, sodium chloride flush, sodium chloride, potassium chloride **OR** potassium alternative oral replacement **OR** potassium chloride, potassium chloride, magnesium sulfate, promethazine **OR** ondansetron, magnesium hydroxide, acetaminophen **OR** acetaminophen, glucose, dextrose, glucagon (rDNA), dextrose, oxyCODONE      Intake/Output Summary (Last 24 hours) at 5/8/2022 1122  Last data filed at 5/8/2022 0914  Gross per 24 hour   Intake 4056.77 ml   Output 700 ml   Net 3356.77 ml       Physical Exam Performed:    /71   Pulse 70   Temp 97.9 °F (36.6 °C) (Oral)   Resp 16   Ht 5' 9\" (1.753 m)   Wt 175 lb 14.8 oz (79.8 kg)   SpO2 98%   BMI 25.98 kg/m²     General appearance: No apparent distress, on RA, lying in bed, appears comfortable  HEENT:  Conjunctivae/corneas clear. Neck: Supple, with full range of motion. Respiratory:  Normal respiratory effort. Clear to auscultation, bilaterally without Rales/Wheezes/Rhonchi. Cardiovascular: Regular rate and rhythm with normal S1/S2 without murmurs or rubs  Abdomen: Soft, non-tender, non-distended, normal bowel sounds. Musculoskeletal: No cyanosis or edema bilaterally  Neurologic:  without any focal sensory/motor deficits. grossly non-focal.  Psychiatric: Alert and oriented, Normal mood  Peripheral Pulses: +2 palpable, equal bilaterally       Labs:   Recent Labs     05/06/22  1359 05/07/22  0748   WBC 10.6 8.4   HGB 14.8 12.4*   HCT 42.3 36.2*    253     Recent Labs     05/06/22  1405 05/07/22  0748 05/08/22  0739   * 133* 135*   K 4.0 3.9 4.0   CL 91* 99 100   CO2 25 23 23   BUN 11 8 8   CREATININE 0.5* <0.5* <0.5*   CALCIUM 9.3 8.7 8.9     Recent Labs     05/06/22  1405   AST 14*   ALT 11   BILITOT 1.2*   ALKPHOS 182*     No results for input(s): INR in the last 72 hours. No results for input(s): Connee Matar in the last 72 hours.     Urinalysis:      Lab Results   Component Value Date    NITRU Negative 05/06/2022    WBCUA 397 04/13/2022    BACTERIA Rare 02/27/2014    RBCUA 20 04/13/2022    BLOODU Negative 05/06/2022    SPECGRAV 1.052 05/06/2022    GLUCOSEU >=1000 05/06/2022       Radiology:  CT ABDOMEN PELVIS W IV CONTRAST Additional Contrast? None   Final Result   Worsening right epididymal orchitis as described above with a small fluid   collection in the pelvis as measured above. Assessment/Plan:    Active Hospital Problems    Diagnosis     Abdominal pain [R10.9]      Priority: Medium     Patient is a 79-year-old male with remote past medical history of diabetes mellitus but not taking any medications who presents to the hospital for right scrotum pain and groin pain. According to the patient it can go up to 10/10 intensity, radiates to right groin, associated with swelling in his groin, he mentions he has only 1 sexual partner, his partner does not have any STD at this time, he denies previous history of STDs, he is not sure of any exposure to HIV. Patient denied fevers chills chest pain nausea vomiting diarrhea dysuria. Patient mentions he was recently started on oral antibiotics as outpatient but the swelling has been worsening so he decided to come to the hospital at the request of urology.     Assessment  Worsening right epididymal orchitis  history of diabetes mellitus, medication nonadherence  Hyponatremia  Lactic acidosis  Sepsis present on admission likely secondary to #1        Plan  Infectious disease consulted, antibiotics per ID, patient is a started on Zosyn, linezolid, fluconazole  Check blood cultures-negative to date, urine culture-negative lactate  Urology following, no surgical plan at this time, if fails to improve on antibiotics plan for right orchiectomy  Continue IV fluid therapy with normal saline  Start weight-based long-acting insulin, insulin sliding scale, increase long-acting insulin, A1c is 12  Diet: ADULT DIET;  Regular; 4 carb choices (60 gm/meal)  Code Status: Full Code    PT/OT Eval Status: ordered    Dispo - continue IV antibiotics per ID, continue to monitor inpatient, urology following, if no improvement possible orchiectomy    Clark Lundy MD

## 2022-05-08 NOTE — PROGRESS NOTES
Pt Name: [facility] Record Number: [id number]  Date of Birth 1985   Today's Date: 5/8/2022      Subjective: Stable overnight, patient feels slightly better but still having persisting pain. Denies fever    ROS: Constitutional: No fever    Vitals:  Vitals:    05/08/22 0235 05/08/22 0305 05/08/22 0515 05/08/22 0822   BP:   104/67 100/71   Pulse:   60 70   Resp: 18 18 16 16   Temp:   97.7 °F (36.5 °C) 97.9 °F (36.6 °C)   TempSrc:   Oral Oral   SpO2:   98% 98%   Weight:   175 lb 14.8 oz (79.8 kg)    Height:         I/O last 3 completed shifts: In: 4296.8 [P.O.:480; I.V.:2548.7; IV Piggyback:1268]  Out: 600 [Urine:600]    Exam:  General: Awake, oriented, no acute distress  Respiratory: Nonlabored breathing  Abdomen: Soft, non-tender, non-distended, no masses  : Right testicle swelling similar to yesterday. Right spermatic cord unchanged. Indurated and firm. No skin cellulitis or crepitus.   No fluctuance  Skin: Skin color, texture, turgor normal, no rashes or lesions  Neurologic: no gross deficits    CURRENT MEDICATIONS   Scheduled Meds:   fluconazole  200 mg IntraVENous Q24H    insulin glargine  0.25 Units/kg SubCUTAneous Nightly    sodium chloride flush  10 mL IntraVENous 2 times per day    enoxaparin  40 mg SubCUTAneous Daily    piperacillin-tazobactam  4,500 mg IntraVENous Q8H    insulin lispro  0-12 Units SubCUTAneous TID     insulin lispro  0-6 Units SubCUTAneous Nightly    linezolid  600 mg IntraVENous Q12H     Continuous Infusions:   sodium chloride 5 mL/hr at 05/08/22 0648    dextrose      sodium chloride 75 mL/hr at 05/08/22 0648     PRN Meds:.ibuprofen, sodium chloride flush, sodium chloride, potassium chloride **OR** potassium alternative oral replacement **OR** potassium chloride, potassium chloride, magnesium sulfate, promethazine **OR** ondansetron, magnesium hydroxide, acetaminophen **OR** acetaminophen, glucose, dextrose, glucagon (rDNA), dextrose, oxyCODONE    LABS     Recent Labs     05/06/22  1359 05/06/22  1405 05/07/22  0748 05/08/22  0739   WBC 10.6  --  8.4  --    HGB 14.8  --  12.4*  --    HCT 42.3  --  36.2*  --      --  253  --    NA  --  126* 133* 135*   K  --  4.0 3.9 4.0   CL  --  91* 99 100   CO2  --  25 23 23   BUN  --  11 8 8   CREATININE  --  0.5* <0.5* <0.5*   CALCIUM  --  9.3 8.7 8.9   AST  --  14*  --   --    ALT  --  11  --   --    BILITOT  --  1.2*  --   --          ASSESSMENT   1. Hospital day # 2  2. Severe right epididymal orchitis. CT with possible small abscess along spermatic cord at the internal ring. not responding to outpatient therapy. Cultures from admission no growth to date. Slight 10 to 20% improvement with systemic antibiotic therapy after 48 hours. PLAN   1. Continue observation with IV antibiotic therapy. There has been slight improvement but no significant changes and induration of the spermatic cord. I counseled the patient that if its not getting better over the next 48 hours he is likely looking needing right orchiectomy to manage.      Rakesh Menchaca MD 5/8/2022 10:24 AM

## 2022-05-08 NOTE — PLAN OF CARE
Problem: Discharge Planning  Goal: Discharge to home or other facility with appropriate resources  5/8/2022 0008 by Andrez Mancilla RN  Outcome: Progressing  5/7/2022 1606 by Bobbi Harrison RN  Outcome: Progressing   Continuing to work with patient and health care team on discharge plan. Discharge instructions and medication management will be reviewed prior to discharge. Problem: Pain  Goal: Verbalizes/displays adequate comfort level or baseline comfort level  5/8/2022 0008 by Andrez Mancilla RN  Outcome: Progressing  5/7/2022 1606 by Bobbi Harrison RN  Outcome: Progressing  Flowsheets (Taken 5/7/2022 1606)  Verbalizes/displays adequate comfort level or baseline comfort level:   Encourage patient to monitor pain and request assistance   Assess pain using appropriate pain scale   Administer analgesics based on type and severity of pain and evaluate response   Implement non-pharmacological measures as appropriate and evaluate response   Consider cultural and social influences on pain and pain management   Notify Licensed Independent Practitioner if interventions unsuccessful or patient reports new pain   Pt able to express presence/absence of pain and rate pain appropriately using numerical scale. Pain/discomfort being managed with PRN analgesics per MD orders (see MAR). Pain assessed every shift and after interventions. Problem: Safety - Adult  Goal: Free from fall injury  5/8/2022 0008 by Andrez Mancilla RN  Outcome: Progressing  5/7/2022 1606 by Bobbi Harrison RN  Outcome: Progressing  Flowsheets (Taken 5/7/2022 1200)  Free From Fall Injury: Instruct family/caregiver on patient safety     Problem: Skin/Tissue Integrity  Goal: Absence of new skin breakdown  Description: 1. Monitor for areas of redness and/or skin breakdown  2. Assess vascular access sites hourly  3. Every 4-6 hours minimum:  Change oxygen saturation probe site  4.   Every 4-6 hours:  If on nasal continuous positive airway pressure, respiratory therapy assess nares and determine need for appliance change or resting period.   5/8/2022 0008 by Kurtis Jolley RN  Outcome: Progressing  5/7/2022 1606 by Paul Johnson RN  Outcome: Progressing     Problem: ABCDS Injury Assessment  Goal: Absence of physical injury  5/8/2022 0008 by Kurtis Jolley RN  Outcome: Progressing  5/7/2022 1606 by Paul Johnson RN  Outcome: Progressing

## 2022-05-08 NOTE — PLAN OF CARE
Problem: Discharge Planning  Goal: Discharge to home or other facility with appropriate resources  5/8/2022 1352 by Angelika Myrick RN  Outcome: Progressing     Problem: Pain  Goal: Verbalizes/displays adequate comfort level or baseline comfort level  5/8/2022 1352 by Angelika Myrick RN  Outcome: Progressing     Problem: Safety - Adult  Goal: Free from fall injury  5/8/2022 1352 by Angelika Myrick RN  Outcome: Progressing     Problem: Skin/Tissue Integrity  Goal: Absence of new skin breakdown  Description: 1. Monitor for areas of redness and/or skin breakdown  2. Assess vascular access sites hourly  3. Every 4-6 hours minimum:  Change oxygen saturation probe site  4. Every 4-6 hours:  If on nasal continuous positive airway pressure, respiratory therapy assess nares and determine need for appliance change or resting period.   5/8/2022 1352 by Angelika Myrick RN  Outcome: Progressing     Problem: ABCDS Injury Assessment  Goal: Absence of physical injury  5/8/2022 1352 by Angelika Myrick RN  Outcome: Progressing

## 2022-05-08 NOTE — FLOWSHEET NOTE
Pt requesting pain meds and rates pain 10/10. Pt feels his swelling to his R testicle is getting worse and more painful. Oxycodone 5 mg and Tylenol given per orders. Np messaged re pain meds.

## 2022-05-09 LAB
ANION GAP SERPL CALCULATED.3IONS-SCNC: 10 MMOL/L (ref 3–16)
BUN BLDV-MCNC: 7 MG/DL (ref 7–20)
CALCIUM SERPL-MCNC: 8.9 MG/DL (ref 8.3–10.6)
CHLORIDE BLD-SCNC: 100 MMOL/L (ref 99–110)
CO2: 23 MMOL/L (ref 21–32)
CREAT SERPL-MCNC: <0.5 MG/DL (ref 0.9–1.3)
GFR AFRICAN AMERICAN: >60
GFR NON-AFRICAN AMERICAN: >60
GLUCOSE BLD-MCNC: 160 MG/DL (ref 70–99)
GLUCOSE BLD-MCNC: 177 MG/DL (ref 70–99)
GLUCOSE BLD-MCNC: 196 MG/DL (ref 70–99)
GLUCOSE BLD-MCNC: 274 MG/DL (ref 70–99)
GLUCOSE BLD-MCNC: 279 MG/DL (ref 70–99)
PERFORMED ON: ABNORMAL
POTASSIUM REFLEX MAGNESIUM: 4 MMOL/L (ref 3.5–5.1)
SODIUM BLD-SCNC: 133 MMOL/L (ref 136–145)

## 2022-05-09 PROCEDURE — 80048 BASIC METABOLIC PNL TOTAL CA: CPT

## 2022-05-09 PROCEDURE — 99233 SBSQ HOSP IP/OBS HIGH 50: CPT | Performed by: INTERNAL MEDICINE

## 2022-05-09 PROCEDURE — 6370000000 HC RX 637 (ALT 250 FOR IP): Performed by: INTERNAL MEDICINE

## 2022-05-09 PROCEDURE — 6360000002 HC RX W HCPCS: Performed by: INTERNAL MEDICINE

## 2022-05-09 PROCEDURE — 2580000003 HC RX 258: Performed by: INTERNAL MEDICINE

## 2022-05-09 PROCEDURE — 6370000000 HC RX 637 (ALT 250 FOR IP): Performed by: NURSE PRACTITIONER

## 2022-05-09 PROCEDURE — 6360000002 HC RX W HCPCS: Performed by: NURSE PRACTITIONER

## 2022-05-09 PROCEDURE — 36415 COLL VENOUS BLD VENIPUNCTURE: CPT

## 2022-05-09 PROCEDURE — 94760 N-INVAS EAR/PLS OXIMETRY 1: CPT

## 2022-05-09 PROCEDURE — 1200000000 HC SEMI PRIVATE

## 2022-05-09 RX ORDER — MORPHINE SULFATE 4 MG/ML
4 INJECTION, SOLUTION INTRAMUSCULAR; INTRAVENOUS ONCE
Status: COMPLETED | OUTPATIENT
Start: 2022-05-09 | End: 2022-05-09

## 2022-05-09 RX ADMIN — MORPHINE SULFATE 4 MG: 4 INJECTION, SOLUTION INTRAMUSCULAR; INTRAVENOUS at 02:06

## 2022-05-09 RX ADMIN — IBUPROFEN 400 MG: 400 TABLET ORAL at 06:39

## 2022-05-09 RX ADMIN — PIPERACILLIN AND TAZOBACTAM 4500 MG: 4; .5 INJECTION, POWDER, LYOPHILIZED, FOR SOLUTION INTRAVENOUS at 18:45

## 2022-05-09 RX ADMIN — PIPERACILLIN AND TAZOBACTAM 4500 MG: 4; .5 INJECTION, POWDER, LYOPHILIZED, FOR SOLUTION INTRAVENOUS at 10:22

## 2022-05-09 RX ADMIN — OXYCODONE 5 MG: 5 TABLET ORAL at 04:17

## 2022-05-09 RX ADMIN — INSULIN LISPRO 6 UNITS: 100 INJECTION, SOLUTION INTRAVENOUS; SUBCUTANEOUS at 12:32

## 2022-05-09 RX ADMIN — ACETAMINOPHEN 650 MG: 325 TABLET ORAL at 20:19

## 2022-05-09 RX ADMIN — OXYCODONE 5 MG: 5 TABLET ORAL at 10:22

## 2022-05-09 RX ADMIN — LINEZOLID 600 MG: 600 INJECTION, SOLUTION INTRAVENOUS at 08:19

## 2022-05-09 RX ADMIN — INSULIN LISPRO 2 UNITS: 100 INJECTION, SOLUTION INTRAVENOUS; SUBCUTANEOUS at 08:19

## 2022-05-09 RX ADMIN — OXYCODONE 5 MG: 5 TABLET ORAL at 18:45

## 2022-05-09 RX ADMIN — INSULIN LISPRO 1 UNITS: 100 INJECTION, SOLUTION INTRAVENOUS; SUBCUTANEOUS at 20:24

## 2022-05-09 RX ADMIN — IBUPROFEN 400 MG: 400 TABLET ORAL at 12:40

## 2022-05-09 RX ADMIN — SODIUM CHLORIDE: 9 INJECTION, SOLUTION INTRAVENOUS at 12:32

## 2022-05-09 RX ADMIN — LINEZOLID 600 MG: 600 INJECTION, SOLUTION INTRAVENOUS at 20:23

## 2022-05-09 RX ADMIN — PIPERACILLIN AND TAZOBACTAM 4500 MG: 4; .5 INJECTION, POWDER, LYOPHILIZED, FOR SOLUTION INTRAVENOUS at 02:10

## 2022-05-09 RX ADMIN — ENOXAPARIN SODIUM 40 MG: 100 INJECTION SUBCUTANEOUS at 08:18

## 2022-05-09 RX ADMIN — INSULIN GLARGINE 20 UNITS: 100 INJECTION, SOLUTION SUBCUTANEOUS at 20:23

## 2022-05-09 RX ADMIN — IBUPROFEN 400 MG: 400 TABLET ORAL at 20:31

## 2022-05-09 RX ADMIN — SODIUM CHLORIDE: 9 INJECTION, SOLUTION INTRAVENOUS at 18:45

## 2022-05-09 RX ADMIN — ACETAMINOPHEN 650 MG: 325 TABLET ORAL at 02:06

## 2022-05-09 RX ADMIN — INSULIN LISPRO 6 UNITS: 100 INJECTION, SOLUTION INTRAVENOUS; SUBCUTANEOUS at 18:45

## 2022-05-09 ASSESSMENT — PAIN DESCRIPTION - ORIENTATION
ORIENTATION: RIGHT
ORIENTATION: RIGHT

## 2022-05-09 ASSESSMENT — PAIN SCALES - GENERAL
PAINLEVEL_OUTOF10: 2
PAINLEVEL_OUTOF10: 7
PAINLEVEL_OUTOF10: 3
PAINLEVEL_OUTOF10: 9
PAINLEVEL_OUTOF10: 3
PAINLEVEL_OUTOF10: 6
PAINLEVEL_OUTOF10: 8
PAINLEVEL_OUTOF10: 3
PAINLEVEL_OUTOF10: 7
PAINLEVEL_OUTOF10: 3
PAINLEVEL_OUTOF10: 4

## 2022-05-09 ASSESSMENT — PAIN DESCRIPTION - LOCATION
LOCATION: SCROTUM

## 2022-05-09 ASSESSMENT — PAIN DESCRIPTION - DESCRIPTORS
DESCRIPTORS: DISCOMFORT
DESCRIPTORS: DISCOMFORT
DESCRIPTORS: SHARP;SHOOTING
DESCRIPTORS: SHARP;SHOOTING

## 2022-05-09 ASSESSMENT — PAIN - FUNCTIONAL ASSESSMENT: PAIN_FUNCTIONAL_ASSESSMENT: PREVENTS OR INTERFERES SOME ACTIVE ACTIVITIES AND ADLS

## 2022-05-09 NOTE — FLOWSHEET NOTE
Message sent to Kira Faulkner NP per patient request for pain medication. Order obtained for 1x dose of morphine. Will also give tylenol for temp of 100.4.

## 2022-05-09 NOTE — PROGRESS NOTES
[facility]: [id number]  Date of Birth 1985   Today's Date: 5/9/2022      Subjective: Stable overnight, Having persisting pain and increased firmness to site. Fever up to 100.4 overnight    ROS: Constitutional: Fever 100.4 overnight. Vitals:  Vitals:    05/09/22 0417 05/09/22 0447 05/09/22 0650 05/09/22 0818   BP:   110/66 103/66   Pulse:   59 69   Resp:  16 18 16   Temp: 98.4 °F (36.9 °C)  97.4 °F (36.3 °C) 97.7 °F (36.5 °C)   TempSrc: Oral  Oral Oral   SpO2:   98% 97%   Weight:   177 lb 11.1 oz (80.6 kg)    Height:         I/O last 3 completed shifts: In: 8370 [P.O.:2090; I.V.:3989.7; IV Piggyback:2290.3]  Out: 3200 [Urine:3200]    Exam:  General: Awake, oriented, no acute distress  Respiratory: Nonlabored breathing  Abdomen: Soft, non-tender, non-distended, no masses  : Right testicle swelling, indurated and firm, some erythema noted into inguinal canal. Right spermatic cord unchanged.  No fluctuance  Skin: Skin color, texture, turgor normal, no rashes or lesions  Neurologic: no gross deficits    CURRENT MEDICATIONS   Scheduled Meds:   fluconazole  200 mg IntraVENous Q24H    insulin glargine  0.25 Units/kg SubCUTAneous Nightly    sodium chloride flush  10 mL IntraVENous 2 times per day    enoxaparin  40 mg SubCUTAneous Daily    piperacillin-tazobactam  4,500 mg IntraVENous Q8H    insulin lispro  0-12 Units SubCUTAneous TID     insulin lispro  0-6 Units SubCUTAneous Nightly    linezolid  600 mg IntraVENous Q12H     Continuous Infusions:   sodium chloride 5 mL/hr at 05/08/22 2324    dextrose      sodium chloride 75 mL/hr at 05/09/22 0638     PRN Meds:.ibuprofen, sodium chloride flush, sodium chloride, potassium chloride **OR** potassium alternative oral replacement **OR** potassium chloride, potassium chloride, magnesium sulfate, promethazine **OR** ondansetron, magnesium hydroxide, acetaminophen **OR** acetaminophen, glucose, dextrose, glucagon (rDNA), dextrose, oxyCODONE    LABS     Recent Labs     05/06/22  1359 05/06/22  1405 05/06/22  1405 05/07/22  0748 05/08/22  0739 05/09/22  0722   WBC 10.6  --   --  8.4  --   --    HGB 14.8  --   --  12.4*  --   --    HCT 42.3  --   --  36.2*  --   --      --   --  253  --   --    NA  --  126*   < > 133* 135* 133*   K  --  4.0   < > 3.9 4.0 4.0   CL  --  91*   < > 99 100 100   CO2  --  25   < > 23 23 23   BUN  --  11   < > 8 8 7   CREATININE  --  0.5*   < > <0.5* <0.5* <0.5*   CALCIUM  --  9.3   < > 8.7 8.9 8.9   AST  --  14*  --   --   --   --    ALT  --  11  --   --   --   --    BILITOT  --  1.2*  --   --   --   --     < > = values in this interval not displayed. ASSESSMENT   1. Hospital day # 3  2. Severe right epididymal orchitis. CT with possible small abscess along spermatic cord at the internal ring. not responding to outpatient therapy. Cultures from admission no growth to date. Slight 10 to 20% improvement with systemic antibiotic therapy after 48 hours. PLAN   1. Continue observation with IV antibiotic therapy. There has been slight improvement but no significant changes and induration of the spermatic cord. 2. Will discuss case with MD as he will likely need right orchiectomy to manage.      Traci Linares, ISAAK - CNP 5/9/2022 11:41 AM

## 2022-05-09 NOTE — CONSULTS
CM to assess.     Venice Noe RN, BSN,   [phone]  Electronically signed by Venice Noe RN on 5/9/2022 at 2:17 PM

## 2022-05-09 NOTE — PLAN OF CARE
Problem: Discharge Planning  Goal: Discharge to home or other facility with appropriate resources  5/9/2022 0054 by Cassidy Aden RN  Outcome: Progressing  5/9/2022 0054 by Cassidy Aden RN  Outcome: Progressing  Flowsheets (Taken 5/8/2022 2131)  Discharge to home or other facility with appropriate resources: Identify barriers to discharge with patient and caregiver  5/8/2022 1352 by Renny Kaur RN  Outcome: Progressing     Problem: Pain  Goal: Verbalizes/displays adequate comfort level or baseline comfort level  5/9/2022 0054 by Cassidy Aden RN  Outcome: Progressing  5/8/2022 1352 by Renny Kaur RN  Outcome: Progressing   Pain/discomfort being managed with PRN analgesics per MD orders. Pt able to express presence and absence of pain and rate pain appropriately using numerical scale. Problem: Safety - Adult  Goal: Free from fall injury  5/9/2022 0054 by Cassidy Aden RN  Outcome: Progressing  5/8/2022 1352 by Renny Kaur RN  Outcome: Progressing     Problem: Skin/Tissue Integrity  Goal: Absence of new skin breakdown  Description: 1. Monitor for areas of redness and/or skin breakdown  2.   Assess vascular access sites hourly  5/9/2022 0054 by Cassidy Aden RN  Outcome: Progressing  5/8/2022 1352 by Renny Kaur RN  Outcome: Progressing     Problem: ABCDS Injury Assessment  Goal: Absence of physical injury  5/9/2022 0054 by Cassidy Aden RN  Outcome: Progressing  Flowsheets (Taken 5/9/2022 0027)  Absence of Physical Injury: Implement safety measures based on patient assessment  5/8/2022 1352 by Renny Kaur RN  Outcome: Progressing

## 2022-05-09 NOTE — CARE COORDINATION
INITIAL CASE MANAGEMENT ASSESSMENT    Reviewed chart, met with patient to assess possible discharge needs. Explained Case Management role/services. Living Situation: verified address, lives in a 1 story house with 5 HI    ADLs: independent     DME: diabetic but not glucometer     Transportation: active , should have a ride home at dc     Medications: no barriers \"if not too expensive\", wants [employer] with possible partial or total chiki due to unable to work recently, [facility] Mage information give    PCP: not established, gave [employer], understands he needs to see someone for his diabetes    PLAN/COMMENTS: denies any other needs other then what is mentioned above, plans to return home at dc, should have a ride, may need chiki meds, [address] information given, awaiting Urology plans    CM provided contact information for patient or family to call with any questions. CM will follow and assist as needed.     Oliver Lorenzo RN, BSN,   [phone]    Electronically signed by Oliver Lorenzo RN on 5/9/2022 at 2:21 PM

## 2022-05-09 NOTE — PROGRESS NOTES
reviewed. No pertinent surgical history. Current Medications:    Outpatient Medications Marked as Taking for the 5/6/22 encounter [facility] Encounter)   Medication Sig Dispense Refill    ciprofloxacin (CIPRO) 250 MG tablet Take 250 mg by mouth 2 times daily For 14 days      ibuprofen (ADVIL;MOTRIN) 600 MG tablet Take 600 mg by mouth every 6 hours as needed for Pain         Allergies:  Patient has no known allergies. Immunizations : There is no immunization history on file for this patient. Social History:    Social History     Tobacco Use    Smoking status: Never Smoker    Smokeless tobacco: Never Used   Substance Use Topics    Alcohol use: No    Drug use: No     Social History     Tobacco Use   Smoking Status Never Smoker   Smokeless Tobacco Never Used      Family History : no DVT no COPD       REVIEW OF SYSTEMS:     Constitutional: fevers + , chillS+  night sweats  Eyes:  negative for blurred vision, eye discharge, visual disturbance   HEENT:  negative for hearing loss, ear drainage,nasal congestion  Respiratory:  negative for cough, shortness of breath or hemoptysis   Cardiovascular:  negative for chest pain, palpitations, syncope  Gastrointestinal:  negative for nausea, vomiting, diarrhea, constipation, abdominal pain  Genitourinary:  negative for frequency, dysuria, urinary incontinence, hematuria SCROTAL PAIN + SWELLING+   Hematologic/Lymphatic:  negative for easy bruising, bleeding and lymphadenopathy  Allergic/Immunologic:  negative for recurrent infections, angioedema, anaphylaxis   Endocrine:  negative for weight changes, polyuria, polydipsia and polyphagia  Musculoskeletal:  negative for joint  pain, swelling, decreased range of motion  Integumentary: No rashes, skin lesions  Neurological:  negative for headaches, slurred speech, unilateral weakness  Psychiatric: negative for hallucinations,confusion,agitation.                 PHYSICAL EXAM:      Vitals:    /66   Pulse 69   Temp 97.7 °F (36.5 °C) (Oral)   Resp 16   Ht 5' 9\" (1.753 m)   Wt 177 lb 11.1 oz (80.6 kg)   SpO2 97%   BMI 26.24 kg/m²     General Appearance: alert,in some  acute distress, no pallor, no icterus   Skin: warm and dry, no rash or erythema  Head: normocephalic and atraumatic  Eyes: pupils equal, round, and reactive to light, conjunctivae normal  ENT: tympanic membrane, external ear and ear canal normal bilaterally, nose without deformity, nasal mucosa and turbinates normal without polyps  Neck: supple and non-tender without mass, no thyromegaly  no cervical lymphadenopathy  Pulmonary/Chest: clear to auscultation bilaterally- no wheezes, rales or rhonchi, normal air movement, no respiratory distress  Cardiovascular: normal rate, regular rhythm, normal S1 and S2, no murmurs, rubs, clicks, or gallops, no carotid bruits  Abdomen: soft, non-tender, non-distended, normal bowel sounds, no masses or organomegaly  Extremities: no cyanosis, clubbing or edema  Musculoskeletal: normal range of motion, no joint swelling, deformity or tenderness  Integumentary: No rashes, no abnormal skin lesions, no petechiae  Neurologic: reflexes normal and symmetric, no cranial nerve deficit  Psych:  Orientation, sensorium, mood normal            Lines:IV  Rt groin inguinal area on going swelling local pain redness some improvement with cellulitis and Rt scrotal swelling and pain no skin changes         Data Review:    CBC:   Lab Results   Component Value Date    WBC 8.4 05/07/2022    HGB 12.4 (L) 05/07/2022    HCT 36.2 (L) 05/07/2022    MCV 89.4 05/07/2022     05/07/2022     RENAL:   Lab Results   Component Value Date    CREATININE <0.5 (L) 05/09/2022    BUN 7 05/09/2022     (L) 05/09/2022    K 4.0 05/09/2022     05/09/2022    CO2 23 05/09/2022     SED RATE:   Lab Results   Component Value Date    SEDRATE 84 05/07/2022     CK: No results found for: CKTOTAL  CRP:   Lab Results   Component Value Date    .4 05/07/2022 Hepatic Function Panel:   Lab Results   Component Value Date    ALKPHOS 182 05/06/2022    ALT 11 05/06/2022    AST 14 05/06/2022    PROT 8.0 05/06/2022    BILITOT 1.2 05/06/2022    LABALBU 3.7 05/06/2022     UA:  Lab Results   Component Value Date    COLORU Yellow 05/06/2022    CLARITYU Clear 05/06/2022    GLUCOSEU >=1000 05/06/2022    BILIRUBINUR Negative 05/06/2022    KETUA TRACE 05/06/2022    SPECGRAV 1.052 05/06/2022    BLOODU Negative 05/06/2022    PHUR 6.5 05/06/2022    PROTEINU Negative 05/06/2022    UROBILINOGEN 1.0 05/06/2022    NITRU Negative 05/06/2022    LEUKOCYTESUR Negative 05/06/2022    LABMICR Not Indicated 05/06/2022    URINETYPE NotGiven 05/06/2022      Urine Microscopic:   Lab Results   Component Value Date    BACTERIA Rare 02/27/2014    HYALCAST 1 04/13/2022    WBCUA 397 04/13/2022    RBCUA 20 04/13/2022    EPIU 2 04/13/2022     Urine Reflex to Culture:   Lab Results   Component Value Date    URRFLXCULT Not Indicated 05/06/2022         MICRO: cultures reviewed and updated by me   Blood Culture:   Lab Results   Component Value Date    [facility]  05/06/2022     No Growth to date. Any change in status will be called. BLOODCULT2  05/06/2022     No Growth to date. Any change in status will be called.      Component 4/13/22 1438   Organism Klebsiella pneumoniae Abnormal     Urine Culture, Routine 25,000 CFU/ml    Resulting Agency [address] [facility]            Susceptibility        Klebsiella pneumoniae (1)     Antibiotic Interpretation Microscan   Method Status     amoxicillin-clavulanate Sensitive <=8/4 mcg/mL BACTERIAL SUSCEPTIBILITY PANEL BY KRISTI       ampicillin Resistant >16 mcg/mL BACTERIAL SUSCEPTIBILITY PANEL BY KRISTI       ampicillin-sulbactam Sensitive <=8/4 mcg/mL BACTERIAL SUSCEPTIBILITY PANEL BY KRISTI       ceFAZolin Sensitive <=2 mcg/mL BACTERIAL SUSCEPTIBILITY PANEL BY KRISTI         NOTE: Cefazolin should only be used for uncomplicated UTI         for E.coli or Klebsiella pneumoniae.           cefepime Sensitive <=2 mcg/mL BACTERIAL SUSCEPTIBILITY PANEL BY KRISTI       cefTRIAXone Sensitive <=1 mcg/mL BACTERIAL SUSCEPTIBILITY PANEL BY KRISTI       cefuroxime Sensitive <=4 mcg/mL BACTERIAL SUSCEPTIBILITY PANEL BY KRISTI       ciprofloxacin Sensitive <=1 mcg/mL BACTERIAL SUSCEPTIBILITY PANEL BY KRISTI       ertapenem Sensitive <=0.5 mcg/mL BACTERIAL SUSCEPTIBILITY PANEL BY KRISTI       gentamicin Sensitive <=4 mcg/mL BACTERIAL SUSCEPTIBILITY PANEL BY KRISTI       meropenem Sensitive <=1 mcg/mL BACTERIAL SUSCEPTIBILITY PANEL BY KRISTI       nitrofurantoin Resistant >64 mcg/mL BACTERIAL SUSCEPTIBILITY PANEL BY KRISTI       piperacillin-tazobactam Sensitive <=16 mcg/mL BACTERIAL SUSCEPTIBILITY PANEL BY KRISTI       trimethoprim-sulfamethoxazole Sensitive <=2/38 mcg/mL BACTERIAL SUSCEPTIBILITY PANEL BY KRISTI             Narrative  [facility]formed by: [facility]  ORDER#: [id number]                          ORDERED BY: Penny Antony   SOURCE: Urine Clean Catch                  COLLECTED:  04/13/22                Procedure Component Value Units Date/Time   Culture, Blood 2 [[id number]] Collected: 05/06/22 1359   Order Status: Sent Specimen: Blood Updated: 05/06/22 1420   Culture, Blood 1 [[id number]] Collected: 05/06/22 1359   Order Status: Sent Specimen: Blood Updated: 05/06/22 1420       Respiratory Culture:  No results found for: Gold Pro  AFB:No results found for: AFBSMEAR  Viral Culture:  No results found for: COVID19  Urine Culture:   Recent Labs     05/06/22  1359   LABURIN No growth at 18 to 36 hours         IMAGING:    CT ABDOMEN PELVIS W IV CONTRAST Additional Contrast? None   Final Result   Worsening right epididymal orchitis as described above with a small fluid   collection in the pelvis as measured above. CT ABDOMEN PELVIS W IV CONTRAST Additional Contrast? None   Final Result   Worsening right epididymal orchitis as described above with a small fluid   collection in the pelvis as measured above.            USG sCROTUM :  4/13/22     Impression   Findings are most suggestive of right epididymo-orchitis.  Clinical follow-up   to resolution is recommended.       Flow was seen within each testicle, arguing against acute torsion.       3 mm left epididymal cyst or spermatocele.       RECOMMENDATIONS:   Unavailable             All the pertinent images and reports for the current Hospitalization were reviewed by me     Scheduled Meds:   fluconazole  200 mg IntraVENous Q24H    insulin glargine  0.25 Units/kg SubCUTAneous Nightly    sodium chloride flush  10 mL IntraVENous 2 times per day    enoxaparin  40 mg SubCUTAneous Daily    piperacillin-tazobactam  4,500 mg IntraVENous Q8H    insulin lispro  0-12 Units SubCUTAneous TID WC    insulin lispro  0-6 Units SubCUTAneous Nightly    linezolid  600 mg IntraVENous Q12H       Continuous Infusions:   sodium chloride 5 mL/hr at 05/08/22 2324    dextrose      sodium chloride 75 mL/hr at 05/09/22 5447       PRN Meds:  ibuprofen, sodium chloride flush, sodium chloride, potassium chloride **OR** potassium alternative oral replacement **OR** potassium chloride, potassium chloride, magnesium sulfate, promethazine **OR** ondansetron, magnesium hydroxide, acetaminophen **OR** acetaminophen, glucose, dextrose, glucagon (rDNA), dextrose, oxyCODONE      Assessment:     Patient Active Problem List   Diagnosis    Abdominal pain       Sepsis  Fevers   WBC normal from partial treatment   Rt inguinal fluid collection concern for abscess on the CT  Rt epididymo orchitis with extension into inguinal canal  UTI recent with Klebsiella pneumoniae partially treated  DM poor control ( NOT TAKING MEDS > 2 YR due to lack of INSURANCE)  Remote HISTORY of Gonorrhea  CT abd/pelvis with worsening of Orchitis compared to before        UNfortunately has not responded to out patient treatment as expected despite appropriate course of abx makes me wonder if he has not Taken his MEDS  Due to Lack of INSURANCE or due to Uncontrolled DM he is having complications     As this is some what unusual if Klebsiella is the Culprit Organism was sensitive to Bactrim, Cipro  Etc       He has made minimal progress on broad spectrum IV abx and most likely needs surgery will d/w Urology about their plans    Labs, Microbiology, Radiology and all the pertinent results from current hospitalization and  care every where were reviewed  by me as a part of the evaluation   Plan:   1. IV Zosyn x 4.5 gm Q 8hrs HIGH dose   2. Cont   IV Linezolid x 600 mg q 12 hrs  3. Cont  IV Fluconazole x 400 mg   4. Control DM    5. HbA1C very elevated    6. bLOOD CX in process  NGDT  7. HIV -ve, Hepatitis, Gonorrhea -ve, Chlamydia -ve, Syphilis screen  -ve  8. ESR. CRP  9. Social service assistance for antibiotic help  10. Feel he needs surgery soon as Medically not responding will d/w Urology team      Discussed with patient/Family and Nursing   Risk of Complications/Morbidity: High      · Illness(es)/ Infection present that pose threat to bodily function. · There is potential for severe exacerbation of infection/side effects of treatment. · Therapy requires intensive monitoring for antimicrobial agent toxicity        Discussed with patient/Family and Nursing staff     Thanks for allowing me to participate in your patient's care and please call me with any questions or concerns.     Karen Farah MD  Infectious Disease  [facility] ([facility]) Physician  Phone: [phone]   Fax : [phone]

## 2022-05-09 NOTE — PROGRESS NOTES
Hospitalist Progress Note      PCP: No primary care provider on file. Chief Complaint. Patient is a 79-year-old male with remote past medical history of diabetes mellitus but not taking any medications who presents to the hospital for right scrotum pain and groin pain. According to the patient it can go up to 10/10 intensity, radiates to right groin, associated with swelling in his groin, he mentions he has only 1 sexual partner, his partner does not have any STD at this time, he denies previous history of STDs, he is not sure of any exposure to HIV. Patient denied fevers chills chest pain nausea vomiting diarrhea dysuria. Patient mentions he was recently started on oral antibiotics as outpatient but the swelling has been worsening so he decided to come to the hospital at the request of urology.     Date of Admission: 5/6/2022    Subjective: no significant change in scrotal swelling, he mentions pain is better, denies chest pain, nausea, vomiting, shortness of breath, fever or chills    Medications:  Reviewed    Infusion Medications    sodium chloride 5 mL/hr at 05/08/22 2324    dextrose      sodium chloride 75 mL/hr at 05/09/22 1232     Scheduled Medications    fluconazole  200 mg IntraVENous Q24H    insulin glargine  0.25 Units/kg SubCUTAneous Nightly    sodium chloride flush  10 mL IntraVENous 2 times per day    enoxaparin  40 mg SubCUTAneous Daily    piperacillin-tazobactam  4,500 mg IntraVENous Q8H    insulin lispro  0-12 Units SubCUTAneous TID WC    insulin lispro  0-6 Units SubCUTAneous Nightly    linezolid  600 mg IntraVENous Q12H     PRN Meds: ibuprofen, sodium chloride flush, sodium chloride, potassium chloride **OR** potassium alternative oral replacement **OR** potassium chloride, potassium chloride, magnesium sulfate, promethazine **OR** ondansetron, magnesium hydroxide, acetaminophen **OR** acetaminophen, glucose, dextrose, glucagon (rDNA), dextrose, oxyCODONE      Intake/Output Summary (Last 24 hours) at 5/9/2022 1724  Last data filed at 5/9/2022 1223  Gross per 24 hour   Intake 4295.46 ml   Output 2000 ml   Net 2295.46 ml       Physical Exam Performed:    /66   Pulse 71   Temp 98.2 °F (36.8 °C) (Oral)   Resp 18   Ht 5' 9\" (1.753 m)   Wt 177 lb 11.1 oz (80.6 kg)   SpO2 97%   BMI 26.24 kg/m²     General appearance: NAD, on RA  HEENT:  Conjunctivae/corneas clear. Neck: Supple, with full range of motion. Respiratory:  Normal respiratory effort. Clear to auscultation, bilaterally without Rales/Wheezes/Rhonchi. Cardiovascular: Regular rate and rhythm with normal S1/S2 without murmurs or rubs  Abdomen: Soft, non-tender, non-distended, normal bowel sounds. Musculoskeletal: No cyanosis or edema bilaterally  Neurologic:  without any focal sensory/motor deficits. grossly non-focal.  Psychiatric: Alert and oriented, Normal mood  Peripheral Pulses: +2 palpable, equal bilaterally       Labs:   Recent Labs     05/07/22  0748   WBC 8.4   HGB 12.4*   HCT 36.2*        Recent Labs     05/07/22  0748 05/08/22  0739 05/09/22  0722   * 135* 133*   K 3.9 4.0 4.0   CL 99 100 100   CO2 23 23 23   BUN 8 8 7   CREATININE <0.5* <0.5* <0.5*   CALCIUM 8.7 8.9 8.9     No results for input(s): AST, ALT, BILIDIR, BILITOT, ALKPHOS in the last 72 hours. No results for input(s): INR in the last 72 hours. No results for input(s): Verlena Jose Alejandro in the last 72 hours. Urinalysis:      Lab Results   Component Value Date    NITRU Negative 05/06/2022    WBCUA 397 04/13/2022    BACTERIA Rare 02/27/2014    RBCUA 20 04/13/2022    BLOODU Negative 05/06/2022    SPECGRAV 1.052 05/06/2022    GLUCOSEU >=1000 05/06/2022       Radiology:  CT ABDOMEN PELVIS W IV CONTRAST Additional Contrast? None   Final Result   Worsening right epididymal orchitis as described above with a small fluid   collection in the pelvis as measured above.                Assessment/Plan:    Active Hospital Problems    Diagnosis  Abdominal pain [R10.9]      Priority: Medium     Patient is a 21-year-old male with remote past medical history of diabetes mellitus but not taking any medications who presents to the hospital for right scrotum pain and groin pain. According to the patient it can go up to 10/10 intensity, radiates to right groin, associated with swelling in his groin, he mentions he has only 1 sexual partner, his partner does not have any STD at this time, he denies previous history of STDs, he is not sure of any exposure to HIV. Patient denied fevers chills chest pain nausea vomiting diarrhea dysuria. Patient mentions he was recently started on oral antibiotics as outpatient but the swelling has been worsening so he decided to come to the hospital at the request of urology.     Assessment  Worsening right epididymal orchitis  history of diabetes mellitus, medication nonadherence  Hyponatremia  Lactic acidosis  Sepsis present on admission likely secondary to #1        Plan  Infectious disease consulted, antibiotics per ID, patient is a started on Zosyn, linezolid, fluconazole  Check blood cultures-negative to date, urine culture-negative lactate  Urology following, no surgical plan at this time, if fails to improve on antibiotics plan for right orchiectomy  Continue IV fluid therapy with normal saline  Start weight-based long-acting insulin, insulin sliding scale, increase long-acting insulin, A1c is 12  Diet: ADULT DIET;  Regular; 4 carb choices (60 gm/meal)  Code Status: Full Code    PT/OT Eval Status: ordered    Dispo - continue IV antibiotics per ID, continue to monitor inpatient, urology following, possible orchiectomy per urology, ID following    Isai Liu MD

## 2022-05-10 LAB
ANION GAP SERPL CALCULATED.3IONS-SCNC: 12 MMOL/L (ref 3–16)
BLOOD CULTURE, ROUTINE: NORMAL
BLOOD CULTURE, ROUTINE: NORMAL
BUN BLDV-MCNC: 5 MG/DL (ref 7–20)
CALCIUM SERPL-MCNC: 8.7 MG/DL (ref 8.3–10.6)
CHLORIDE BLD-SCNC: 101 MMOL/L (ref 99–110)
CO2: 23 MMOL/L (ref 21–32)
CREAT SERPL-MCNC: <0.5 MG/DL (ref 0.9–1.3)
CULTURE, BLOOD 2: NORMAL
CULTURE, BLOOD 2: NORMAL
GFR AFRICAN AMERICAN: >60
GFR NON-AFRICAN AMERICAN: >60
GLUCOSE BLD-MCNC: 183 MG/DL (ref 70–99)
GLUCOSE BLD-MCNC: 184 MG/DL (ref 70–99)
GLUCOSE BLD-MCNC: 191 MG/DL (ref 70–99)
GLUCOSE BLD-MCNC: 194 MG/DL (ref 70–99)
GLUCOSE BLD-MCNC: 238 MG/DL (ref 70–99)
GLUCOSE BLD-MCNC: 290 MG/DL (ref 70–99)
PERFORMED ON: ABNORMAL
POTASSIUM REFLEX MAGNESIUM: 3.8 MMOL/L (ref 3.5–5.1)
SODIUM BLD-SCNC: 136 MMOL/L (ref 136–145)

## 2022-05-10 PROCEDURE — 36415 COLL VENOUS BLD VENIPUNCTURE: CPT

## 2022-05-10 PROCEDURE — 94760 N-INVAS EAR/PLS OXIMETRY 1: CPT

## 2022-05-10 PROCEDURE — 6360000002 HC RX W HCPCS: Performed by: INTERNAL MEDICINE

## 2022-05-10 PROCEDURE — 2580000003 HC RX 258: Performed by: INTERNAL MEDICINE

## 2022-05-10 PROCEDURE — 80048 BASIC METABOLIC PNL TOTAL CA: CPT

## 2022-05-10 PROCEDURE — 1200000000 HC SEMI PRIVATE

## 2022-05-10 PROCEDURE — 99233 SBSQ HOSP IP/OBS HIGH 50: CPT | Performed by: INTERNAL MEDICINE

## 2022-05-10 PROCEDURE — 6370000000 HC RX 637 (ALT 250 FOR IP): Performed by: NURSE PRACTITIONER

## 2022-05-10 PROCEDURE — 6370000000 HC RX 637 (ALT 250 FOR IP): Performed by: INTERNAL MEDICINE

## 2022-05-10 RX ORDER — OXYCODONE HYDROCHLORIDE 5 MG/1
5 TABLET ORAL ONCE
Status: COMPLETED | OUTPATIENT
Start: 2022-05-10 | End: 2022-05-10

## 2022-05-10 RX ADMIN — OXYCODONE 5 MG: 5 TABLET ORAL at 17:40

## 2022-05-10 RX ADMIN — FLUCONAZOLE 200 MG: 2 INJECTION, SOLUTION INTRAVENOUS at 23:46

## 2022-05-10 RX ADMIN — SODIUM CHLORIDE: 9 INJECTION, SOLUTION INTRAVENOUS at 12:24

## 2022-05-10 RX ADMIN — INSULIN LISPRO 4 UNITS: 100 INJECTION, SOLUTION INTRAVENOUS; SUBCUTANEOUS at 12:25

## 2022-05-10 RX ADMIN — INSULIN LISPRO 2 UNITS: 100 INJECTION, SOLUTION INTRAVENOUS; SUBCUTANEOUS at 17:40

## 2022-05-10 RX ADMIN — PIPERACILLIN AND TAZOBACTAM 4500 MG: 4; .5 INJECTION, POWDER, LYOPHILIZED, FOR SOLUTION INTRAVENOUS at 20:39

## 2022-05-10 RX ADMIN — SODIUM CHLORIDE: 9 INJECTION, SOLUTION INTRAVENOUS at 21:40

## 2022-05-10 RX ADMIN — SODIUM CHLORIDE: 9 INJECTION, SOLUTION INTRAVENOUS at 07:12

## 2022-05-10 RX ADMIN — FLUCONAZOLE 200 MG: 2 INJECTION, SOLUTION INTRAVENOUS at 01:27

## 2022-05-10 RX ADMIN — OXYCODONE 5 MG: 5 TABLET ORAL at 23:44

## 2022-05-10 RX ADMIN — LINEZOLID 600 MG: 600 INJECTION, SOLUTION INTRAVENOUS at 21:41

## 2022-05-10 RX ADMIN — LINEZOLID 600 MG: 600 INJECTION, SOLUTION INTRAVENOUS at 08:09

## 2022-05-10 RX ADMIN — PIPERACILLIN AND TAZOBACTAM 4500 MG: 4; .5 INJECTION, POWDER, LYOPHILIZED, FOR SOLUTION INTRAVENOUS at 12:25

## 2022-05-10 RX ADMIN — OXYCODONE 5 MG: 5 TABLET ORAL at 08:39

## 2022-05-10 RX ADMIN — IBUPROFEN 400 MG: 400 TABLET ORAL at 20:41

## 2022-05-10 RX ADMIN — INSULIN LISPRO 2 UNITS: 100 INJECTION, SOLUTION INTRAVENOUS; SUBCUTANEOUS at 08:11

## 2022-05-10 RX ADMIN — IBUPROFEN 400 MG: 400 TABLET ORAL at 08:08

## 2022-05-10 RX ADMIN — PIPERACILLIN AND TAZOBACTAM 4500 MG: 4; .5 INJECTION, POWDER, LYOPHILIZED, FOR SOLUTION INTRAVENOUS at 03:34

## 2022-05-10 RX ADMIN — IBUPROFEN 400 MG: 400 TABLET ORAL at 14:17

## 2022-05-10 RX ADMIN — ACETAMINOPHEN 650 MG: 325 TABLET ORAL at 22:13

## 2022-05-10 RX ADMIN — INSULIN GLARGINE 20 UNITS: 100 INJECTION, SOLUTION SUBCUTANEOUS at 21:42

## 2022-05-10 RX ADMIN — INSULIN LISPRO 3 UNITS: 100 INJECTION, SOLUTION INTRAVENOUS; SUBCUTANEOUS at 21:42

## 2022-05-10 RX ADMIN — ENOXAPARIN SODIUM 40 MG: 100 INJECTION SUBCUTANEOUS at 08:08

## 2022-05-10 RX ADMIN — OXYCODONE 5 MG: 5 TABLET ORAL at 04:03

## 2022-05-10 ASSESSMENT — PAIN DESCRIPTION - DESCRIPTORS: DESCRIPTORS: DISCOMFORT

## 2022-05-10 ASSESSMENT — PAIN - FUNCTIONAL ASSESSMENT: PAIN_FUNCTIONAL_ASSESSMENT: PREVENTS OR INTERFERES SOME ACTIVE ACTIVITIES AND ADLS

## 2022-05-10 ASSESSMENT — PAIN SCALES - GENERAL
PAINLEVEL_OUTOF10: 4
PAINLEVEL_OUTOF10: 3
PAINLEVEL_OUTOF10: 6
PAINLEVEL_OUTOF10: 3
PAINLEVEL_OUTOF10: 10
PAINLEVEL_OUTOF10: 7
PAINLEVEL_OUTOF10: 7
PAINLEVEL_OUTOF10: 6
PAINLEVEL_OUTOF10: 1
PAINLEVEL_OUTOF10: 3

## 2022-05-10 ASSESSMENT — PAIN DESCRIPTION - LOCATION
LOCATION: SCROTUM
LOCATION: GROIN

## 2022-05-10 ASSESSMENT — PAIN DESCRIPTION - ORIENTATION
ORIENTATION: RIGHT
ORIENTATION: RIGHT

## 2022-05-10 NOTE — PROGRESS NOTES
Infectious Disease Follow up Notes  Admit Date: 5/6/2022  Hospital Day: 5    Antibiotics :   IV Zosyn   IV Fluconazole  IV Linezolid       CHIEF COMPLAINT:     Sepsis  Fever  Rt groin cellulitis abscess  Epididymo orchitis  DM poor control     Subjective interval History :  44 y.o. man with DM uncontrolled last HbA1C > 10 nearly 2 yrs ago and not been on meds due to lack of insurance was seen in ED on  3/18/22 for Dysuria and UA very abnormal and urine cx with Klebsiella noted was placed on Bactrim back then and had second visit to ED on 4/11/22 with Rt scrotal pain and swelling USG negative was placed on Doxycycline , was seen in ED hereon   4/13/22 was given IM Cetriaxone and added Flagyl and Cipro referred to Urology - was seen by . Urine ccx on that visit 25k of Klebsiella noted. He is now admitted with worsening pain Rt scrotal swelling and redness spreading up the Rt inguinal canal. He has no pain with urination but has severe scrotal pain. CT abd/pelvis on this admit with fluid in the inguinal canal with on going redness and concern for deep infection. 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 He is not insured not sure if he has taken the antibiotics ? ? Location : Rt groin pain, swelling and redness       Quality : aching, burning        Severity : 10/10    Duration :  4 weeks    Timing : constant   Context :  UTI and DM poor control     Modifying factors : none   Associated signs and symptoms: pain swelling local redness        Interval History : rt groin and scrotal pain on going some improvement in the redness but the swelling continues to be bothersome for him. No pain with urination temperatures noted overnight. Sweats. Blood cultures remain negative. Tolerating antibiotic therapy okay. .    Past Medical History:    Past Medical History:   Diagnosis Date    Diabetes mellitus ([facility] [address]        Past Surgical History:    History reviewed. No pertinent surgical history. Current Medications:    Outpatient Medications Marked as Taking for the 5/6/22 encounter [facility] Encounter)   Medication Sig Dispense Refill    ciprofloxacin (CIPRO) 250 MG tablet Take 250 mg by mouth 2 times daily For 14 days      ibuprofen (ADVIL;MOTRIN) 600 MG tablet Take 600 mg by mouth every 6 hours as needed for Pain         Allergies:  Patient has no known allergies. Immunizations : There is no immunization history on file for this patient.     Social History:    Social History     Tobacco Use    Smoking status: Never Smoker    Smokeless tobacco: Never Used   Substance Use Topics    Alcohol use: No    Drug use: No     Social History     Tobacco Use   Smoking Status Never Smoker   Smokeless Tobacco Never Used      Family History : no DVT no COPD       REVIEW OF SYSTEMS:     Constitutional: fevers + , chillS+  night sweats  Eyes:  negative for blurred vision, eye discharge, visual disturbance   HEENT:  negative for hearing loss, ear drainage,nasal congestion  Respiratory:  negative for cough, shortness of breath or hemoptysis   Cardiovascular:  negative for chest pain, palpitations, syncope  Gastrointestinal:  negative for nausea, vomiting, diarrhea, constipation, abdominal pain  Genitourinary:  negative for frequency, dysuria, urinary incontinence, hematuria SCROTAL PAIN + SWELLING+   Hematologic/Lymphatic:  negative for easy bruising, bleeding and lymphadenopathy  Allergic/Immunologic:  negative for recurrent infections, angioedema, anaphylaxis   Endocrine:  negative for weight changes, polyuria, polydipsia and polyphagia  Musculoskeletal:  negative for joint  pain, swelling, decreased range of motion  Integumentary: No rashes, skin lesions  Neurological:  negative for headaches, slurred speech, unilateral weakness  Psychiatric: negative for hallucinations,confusion,agitation.                 PHYSICAL EXAM:      Vitals:    /80   Pulse 76   Temp 98.2 °F (36.8 °C) (Oral)   Resp 18   Ht 5' 9\" (1.753 m)   Wt 173 lb 15.1 oz (78.9 kg)   SpO2 98%   BMI 25.69 kg/m²     General Appearance: alert,in some  acute distress, no pallor, no icterus   Skin: warm and dry, no rash or erythema  Head: normocephalic and atraumatic  Eyes: pupils equal, round, and reactive to light, conjunctivae normal  ENT: tympanic membrane, external ear and ear canal normal bilaterally, nose without deformity, nasal mucosa and turbinates normal without polyps  Neck: supple and non-tender without mass, no thyromegaly  no cervical lymphadenopathy  Pulmonary/Chest: clear to auscultation bilaterally- no wheezes, rales or rhonchi, normal air movement, no respiratory distress  Cardiovascular: normal rate, regular rhythm, normal S1 and S2, no murmurs, rubs, clicks, or gallops, no carotid bruits  Abdomen: soft, non-tender, non-distended, normal bowel sounds, no masses or organomegaly  Extremities: no cyanosis, clubbing or edema  Musculoskeletal: normal range of motion, no joint swelling, deformity or tenderness  Integumentary: No rashes, no abnormal skin lesions, no petechiae  Neurologic: reflexes normal and symmetric, no cranial nerve deficit  Psych:  Orientation, sensorium, mood normal            Lines:IV  Rt groin inguinal area on going swelling local pain redness some improvement with cellulitis and Rt scrotal swelling and pain no skin changes         Data Review:    CBC:   Lab Results   Component Value Date    WBC 8.4 05/07/2022    HGB 12.4 (L) 05/07/2022    HCT 36.2 (L) 05/07/2022    MCV 89.4 05/07/2022     05/07/2022     RENAL:   Lab Results   Component Value Date    CREATININE <0.5 (L) 05/10/2022    BUN 5 (L) 05/10/2022     05/10/2022    K 3.8 05/10/2022     05/10/2022    CO2 23 05/10/2022     SED RATE:   Lab Results   Component Value Date SEDRATE 84 05/07/2022     CK: No results found for: CKTOTAL  CRP:   Lab Results   Component Value Date    .4 05/07/2022     Hepatic Function Panel:   Lab Results   Component Value Date    ALKPHOS 182 05/06/2022    ALT 11 05/06/2022    AST 14 05/06/2022    PROT 8.0 05/06/2022    BILITOT 1.2 05/06/2022    LABALBU 3.7 05/06/2022     UA:  Lab Results   Component Value Date    COLORU Yellow 05/06/2022    CLARITYU Clear 05/06/2022    GLUCOSEU >=1000 05/06/2022    BILIRUBINUR Negative 05/06/2022    KETUA TRACE 05/06/2022    SPECGRAV 1.052 05/06/2022    BLOODU Negative 05/06/2022    PHUR 6.5 05/06/2022    PROTEINU Negative 05/06/2022    UROBILINOGEN 1.0 05/06/2022    NITRU Negative 05/06/2022    LEUKOCYTESUR Negative 05/06/2022    LABMICR Not Indicated 05/06/2022    URINETYPE NotGiven 05/06/2022      Urine Microscopic:   Lab Results   Component Value Date    BACTERIA Rare 02/27/2014    HYALCAST 1 04/13/2022    WBCUA 397 04/13/2022    RBCUA 20 04/13/2022    EPIU 2 04/13/2022     Urine Reflex to Culture:   Lab Results   Component Value Date    URRFLXCULT Not Indicated 05/06/2022         MICRO: cultures reviewed and updated by me   Blood Culture:   Lab Results   Component Value Date    [facility]  05/06/2022     No Growth to date. Any change in status will be called. BLOODCULT2  05/06/2022     No Growth to date. Any change in status will be called.      Component 4/13/22 1438   Organism Klebsiella pneumoniae Abnormal     Urine Culture, Routine 25,000 CFU/ml    Resulting Agency [address] [facility]            Susceptibility        Klebsiella pneumoniae (1)     Antibiotic Interpretation Microscan   Method Status     amoxicillin-clavulanate Sensitive <=8/4 mcg/mL BACTERIAL SUSCEPTIBILITY PANEL BY KRISTI       ampicillin Resistant >16 mcg/mL BACTERIAL SUSCEPTIBILITY PANEL BY KRISTI       ampicillin-sulbactam Sensitive <=8/4 mcg/mL BACTERIAL SUSCEPTIBILITY PANEL BY KRISTI       ceFAZolin Sensitive <=2 mcg/mL BACTERIAL SUSCEPTIBILITY PANEL BY KRISTI         NOTE: Cefazolin should only be used for uncomplicated UTI         for E.coli or Klebsiella pneumoniae.           cefepime Sensitive <=2 mcg/mL BACTERIAL SUSCEPTIBILITY PANEL BY KRISTI       cefTRIAXone Sensitive <=1 mcg/mL BACTERIAL SUSCEPTIBILITY PANEL BY KRISTI       cefuroxime Sensitive <=4 mcg/mL BACTERIAL SUSCEPTIBILITY PANEL BY KRISTI       ciprofloxacin Sensitive <=1 mcg/mL BACTERIAL SUSCEPTIBILITY PANEL BY KRISTI       ertapenem Sensitive <=0.5 mcg/mL BACTERIAL SUSCEPTIBILITY PANEL BY KRISTI       gentamicin Sensitive <=4 mcg/mL BACTERIAL SUSCEPTIBILITY PANEL BY KRISTI       meropenem Sensitive <=1 mcg/mL BACTERIAL SUSCEPTIBILITY PANEL BY KRISTI       nitrofurantoin Resistant >64 mcg/mL BACTERIAL SUSCEPTIBILITY PANEL BY KRISTI       piperacillin-tazobactam Sensitive <=16 mcg/mL BACTERIAL SUSCEPTIBILITY PANEL BY KRISTI       trimethoprim-sulfamethoxazole Sensitive <=2/38 mcg/mL BACTERIAL SUSCEPTIBILITY PANEL BY KRISTI             Narrative  Performed by: Noemy Olivier [facility]  ORDER#: [id number]                          ORDERED BY: Kong Johnson   SOURCE: Urine Clean Catch                  COLLECTED:  04/13/22                Procedure Component Value Units Date/Time   Culture, Blood 2 [[id number]] Collected: 05/06/22 1359   Order Status: Sent Specimen: Blood Updated: 05/06/22 1420   Culture, Blood 1 [[id number]] Collected: 05/06/22 1359   Order Status: Sent Specimen: Blood Updated: 05/06/22 1420       Respiratory Culture:  No results found for: Tamy Jeremi  AFB:No results found for: AFBSMEAR  Viral Culture:  No results found for: COVID19  Urine Culture:   No results for input(s): Hafsa Kaplan in the last 72 hours. IMAGING:    CT ABDOMEN PELVIS W IV CONTRAST Additional Contrast? None   Final Result   Worsening right epididymal orchitis as described above with a small fluid   collection in the pelvis as measured above.              CT ABDOMEN PELVIS W IV CONTRAST Additional Contrast? None   Final Result Worsening right epididymal orchitis as described above with a small fluid   collection in the pelvis as measured above.              USG sCROTUM :  4/13/22     Impression   Findings are most suggestive of right epididymo-orchitis.  Clinical follow-up   to resolution is recommended.       Flow was seen within each testicle, arguing against acute torsion.       3 mm left epididymal cyst or spermatocele.       RECOMMENDATIONS:   Unavailable             All the pertinent images and reports for the current Hospitalization were reviewed by me     Scheduled Meds:   fluconazole  200 mg IntraVENous Q24H    insulin glargine  0.25 Units/kg SubCUTAneous Nightly    sodium chloride flush  10 mL IntraVENous 2 times per day    enoxaparin  40 mg SubCUTAneous Daily    piperacillin-tazobactam  4,500 mg IntraVENous Q8H    insulin lispro  0-12 Units SubCUTAneous TID WC    insulin lispro  0-6 Units SubCUTAneous Nightly    linezolid  600 mg IntraVENous Q12H       Continuous Infusions:   sodium chloride 5 mL/hr at 05/09/22 1845    dextrose      sodium chloride 75 mL/hr at 05/10/22 3968       PRN Meds:  ibuprofen, sodium chloride flush, sodium chloride, potassium chloride **OR** potassium alternative oral replacement **OR** potassium chloride, potassium chloride, magnesium sulfate, promethazine **OR** ondansetron, magnesium hydroxide, acetaminophen **OR** acetaminophen, glucose, dextrose, glucagon (rDNA), dextrose, oxyCODONE      Assessment:     Patient Active Problem List   Diagnosis    Abdominal pain       Sepsis  Fevers   WBC normal from partial treatment   Rt inguinal fluid collection concern for abscess on the CT  Rt epididymo orchitis with extension into inguinal canal  UTI recent with Klebsiella pneumoniae partially treated  DM poor control ( NOT TAKING MEDS > 2 YR due to lack of INSURANCE)  Remote HISTORY of Gonorrhea  CT abd/pelvis with worsening of Orchitis compared to before        UNfortunately has not responded to out patient treatment as expected despite appropriate course of abx makes me wonder if he has not Taken his MEDS  Due to Lack of INSURANCE or due to Uncontrolled DM he is having complications     As this is some what unusual if Klebsiella is the Culprit Organism was sensitive to Bactrim, Cipro  Etc       He has made minimal progress on broad spectrum IV abx and most likely needs surgery will d/w Urology about their plans    Urology following closely is a plan for possible surgical intervention we will request operative cultures including fungal and AFB    Check TB [address], Microbiology, Radiology and all the pertinent results from current hospitalization and  care every where were reviewed  by me as a part of the evaluation   Plan:   1. IV Zosyn x 4.5 gm Q 8hrs HIGH dose   2. Cont   IV Linezolid x 600 mg q 12 hrs  3. Cont  IV Fluconazole x 400 mg   4. Control DM    5. HbA1C very elevated    6. bLOOD CX in process  NGDT  7. HIV -ve, Hepatitis, Gonorrhea -ve, Chlamydia -ve, Syphilis screen  -ve  8. ESR. CRP  9. Social service assistance for antibiotic help  10. Feel he needs surgery soon as Medically not responding will d/w Urology team and they plan to take him Horacio soon  11. Please send OR cx for [employer], fUNGAL routine cx       Discussed with patient/Family and Nursing   Risk of Complications/Morbidity: High      · Illness(es)/ Infection present that pose threat to bodily function. · There is potential for severe exacerbation of infection/side effects of treatment. · Therapy requires intensive monitoring for antimicrobial agent toxicity        Discussed with patient/Family and Nursing staff     Thanks for allowing me to participate in your patient's care and please call me with any questions or concerns.     Gerry Cuello MD  Infectious Disease  [facility] Physician  Phone: [phone]   Fax : [phone]

## 2022-05-10 NOTE — PROGRESS NOTES
Hospitalist Progress Note      PCP: No primary care provider on file. Chief Complaint. Patient is a 44-year-old male with remote past medical history of diabetes mellitus but not taking any medications who presents to the hospital for right scrotum pain and groin pain. According to the patient it can go up to 10/10 intensity, radiates to right groin, associated with swelling in his groin, he mentions he has only 1 sexual partner, his partner does not have any STD at this time, he denies previous history of STDs, he is not sure of any exposure to HIV. Patient denied fevers chills chest pain nausea vomiting diarrhea dysuria. Patient mentions he was recently started on oral antibiotics as outpatient but the swelling has been worsening so he decided to come to the hospital at the request of urology.     Date of Admission: 5/6/2022    Subjective: patient reports mild improvement in swelling, he mentions pain is better, denies chest pain, nausea, vomiting, shortness of breath, fever or chills    Medications:  Reviewed    Infusion Medications    sodium chloride 5 mL/hr at 05/10/22 1224    dextrose      sodium chloride 75 mL/hr at 05/10/22 0712     Scheduled Medications    fluconazole  200 mg IntraVENous Q24H    insulin glargine  0.25 Units/kg SubCUTAneous Nightly    sodium chloride flush  10 mL IntraVENous 2 times per day    enoxaparin  40 mg SubCUTAneous Daily    piperacillin-tazobactam  4,500 mg IntraVENous Q8H    insulin lispro  0-12 Units SubCUTAneous TID WC    insulin lispro  0-6 Units SubCUTAneous Nightly    linezolid  600 mg IntraVENous Q12H     PRN Meds: ibuprofen, sodium chloride flush, sodium chloride, potassium chloride **OR** potassium alternative oral replacement **OR** potassium chloride, potassium chloride, magnesium sulfate, promethazine **OR** ondansetron, magnesium hydroxide, acetaminophen **OR** acetaminophen, glucose, dextrose, glucagon (rDNA), dextrose, oxyCODONE      Intake/Output Summary (Last 24 hours) at 5/10/2022 1955  Last data filed at 5/10/2022 1417  Gross per 24 hour   Intake 1200 ml   Output 550 ml   Net 650 ml       Physical Exam Performed:    /81   Pulse 61   Temp 98.5 °F (36.9 °C) (Oral)   Resp 18   Ht 5' 9\" (1.753 m)   Wt 173 lb 15.1 oz (78.9 kg)   SpO2 96%   BMI 25.69 kg/m²     General appearance: NAD, on RA, lying in bed  HEENT:  Conjunctivae/corneas clear. Neck: Supple, with full range of motion. Respiratory:  Normal respiratory effort. Clear to auscultation, bilaterally without Rales/Wheezes/Rhonchi. Cardiovascular: Regular rate and rhythm with normal S1/S2 without murmurs or rubs  Abdomen: Soft, non-tender, non-distended, normal bowel sounds. Musculoskeletal: No cyanosis or edema bilaterally  Neurologic:  without any focal sensory/motor deficits. grossly non-focal.  Psychiatric: Alert and oriented, Normal mood  Peripheral Pulses: +2 palpable, equal bilaterally       Labs:   No results for input(s): WBC, HGB, HCT, PLT in the last 72 hours. Recent Labs     05/08/22  0739 05/09/22  0722 05/10/22  0658   * 133* 136   K 4.0 4.0 3.8    100 101   CO2 23 23 23   BUN 8 7 5*   CREATININE <0.5* <0.5* <0.5*   CALCIUM 8.9 8.9 8.7     No results for input(s): AST, ALT, BILIDIR, BILITOT, ALKPHOS in the last 72 hours. No results for input(s): INR in the last 72 hours. No results for input(s): Cos Cob Pace in the last 72 hours. Urinalysis:      Lab Results   Component Value Date    NITRU Negative 05/06/2022    WBCUA 397 04/13/2022    BACTERIA Rare 02/27/2014    RBCUA 20 04/13/2022    BLOODU Negative 05/06/2022    SPECGRAV 1.052 05/06/2022    GLUCOSEU >=1000 05/06/2022       Radiology:  CT ABDOMEN PELVIS W IV CONTRAST Additional Contrast? None   Final Result   Worsening right epididymal orchitis as described above with a small fluid   collection in the pelvis as measured above.                Assessment/Plan:    Active Hospital Problems    Diagnosis     Abdominal pain [R10.9]      Priority: Medium     Patient is a 65-year-old male with remote past medical history of diabetes mellitus but not taking any medications who presents to the hospital for right scrotum pain and groin pain. According to the patient it can go up to 10/10 intensity, radiates to right groin, associated with swelling in his groin, he mentions he has only 1 sexual partner, his partner does not have any STD at this time, he denies previous history of STDs, he is not sure of any exposure to HIV. Patient denied fevers chills chest pain nausea vomiting diarrhea dysuria. Patient mentions he was recently started on oral antibiotics as outpatient but the swelling has been worsening so he decided to come to the hospital at the request of urology.     Assessment  Worsening right epididymal orchitis  history of diabetes mellitus, medication nonadherence  Hyponatremia  Lactic acidosis  Sepsis present on admission likely secondary to #1        Plan  Infectious disease consulted, antibiotics per ID, patient is a started on Zosyn, linezolid, fluconazole  Check blood cultures-negative to date, urine culture-negative lactate  Urology following, no surgical plan at this time, if fails to improve on antibiotics plan for right orchiectomy  Continue IV fluid therapy with normal saline  Start weight-based long-acting insulin, insulin sliding scale, increase long-acting insulin, A1c is 12  Diet: ADULT DIET;  Regular; 4 carb choices (60 gm/meal)  Code Status: Full Code    PT/OT Eval Status: ordered    Dispo - plan to continue IV antibiotics per ID, urology following, possible orchiectomy per urology if no improvement    Vaishali Camarillo MD

## 2022-05-10 NOTE — PROGRESS NOTES
Pt Name: [address] [place] [address] Record Number: [id number]  Date of Birth 1985   Today's Date: 5/10/2022      Subjective: Stable overnight, Having persisting pain and increased firmness to site, he feels it is \"less full\". Fever up to 100.8 overnight    ROS: Constitutional: Fever 100.8 overnight. Vitals:  Vitals:    05/10/22 0433 05/10/22 0511 05/10/22 0703 05/10/22 0814   BP:  114/76  124/80   Pulse:  67  76   Resp: 18 18  18   Temp:  98.6 °F (37 °C)  98.2 °F (36.8 °C)   TempSrc:  Oral  Oral   SpO2:  95%  98%   Weight:   173 lb 15.1 oz (78.9 kg)    Height:         I/O last 3 completed shifts: In: 4055.5 [P.O.:890; I.V.:1796.9; IV Piggyback:1368.5]  Out: 2150 [Urine:2150]    Exam:  General: Awake, oriented, no acute distress  Respiratory: Nonlabored breathing  Abdomen: Soft, non-tender, non-distended, no masses  : Right testicle swelling, indurated and firm, some erythema noted into inguinal canal. Right spermatic cord unchanged.  No fluctuance  Skin: Skin color, texture, turgor normal, no rashes or lesions  Neurologic: no gross deficits    CURRENT MEDICATIONS   Scheduled Meds:   fluconazole  200 mg IntraVENous Q24H    insulin glargine  0.25 Units/kg SubCUTAneous Nightly    sodium chloride flush  10 mL IntraVENous 2 times per day    enoxaparin  40 mg SubCUTAneous Daily    piperacillin-tazobactam  4,500 mg IntraVENous Q8H    insulin lispro  0-12 Units SubCUTAneous TID     insulin lispro  0-6 Units SubCUTAneous Nightly    linezolid  600 mg IntraVENous Q12H     Continuous Infusions:   sodium chloride 5 mL/hr at 05/09/22 1845    dextrose      sodium chloride 75 mL/hr at 05/10/22 0712     PRN Meds:.ibuprofen, sodium chloride flush, sodium chloride, potassium chloride **OR** potassium alternative oral replacement **OR** potassium chloride, potassium chloride, magnesium sulfate, promethazine **OR** ondansetron, magnesium hydroxide, acetaminophen **OR** acetaminophen, glucose, dextrose, glucagon (rDNA), dextrose, oxyCODONE    LABS     Recent Labs     05/08/22  0739 05/09/22  0722 05/10/22  0658   * 133* 136   K 4.0 4.0 3.8    100 101   CO2 23 23 23   BUN 8 7 5*   CREATININE <0.5* <0.5* <0.5*   CALCIUM 8.9 8.9 8.7         ASSESSMENT   1. Hospital day # 4  2. Severe right epididymal orchitis. CT with possible small abscess along spermatic cord at the internal ring. not responding to outpatient therapy. Cultures from admission no growth to date. Slight 10 to 20% improvement with systemic antibiotic therapy after 48 hours. PLAN   1. Continue observation with IV antibiotic therapy. There has been slight improvement but no significant changes and induration of the spermatic cord. 2. Working on getting right orchiectomy arranged to manage. Continue antibiotics at this time.     Yamini Lopez, ISAAK - CNP 5/10/2022 9:00 AM

## 2022-05-10 NOTE — PLAN OF CARE
Problem: Discharge Planning  Goal: Discharge to home or other facility with appropriate resources  Outcome: Progressing     Problem: Pain  Goal: Verbalizes/displays adequate comfort level or baseline comfort level  Outcome: Progressing     Problem: Safety - Adult  Goal: Free from fall injury  Outcome: Progressing  Flowsheets (Taken 5/10/2022 0209)  Free From Fall Injury: Instruct family/caregiver on patient safety     Problem: Skin/Tissue Integrity  Goal: Absence of new skin breakdown  Description: 1. Monitor for areas of redness and/or skin breakdown  2. Assess vascular access sites hourly  3. Every 4-6 hours minimum:  Change oxygen saturation probe site  4. Every 4-6 hours:  If on nasal continuous positive airway pressure, respiratory therapy assess nares and determine need for appliance change or resting period.   Outcome: Progressing     Problem: ABCDS Injury Assessment  Goal: Absence of physical injury  Outcome: Progressing  Flowsheets (Taken 5/10/2022 0209)  Absence of Physical Injury: Implement safety measures based on patient assessment     Problem: Chronic Conditions and Co-morbidities  Goal: Patient's chronic conditions and co-morbidity symptoms are monitored and maintained or improved  Outcome: Progressing

## 2022-05-11 LAB
ANION GAP SERPL CALCULATED.3IONS-SCNC: 11 MMOL/L (ref 3–16)
BASOPHILS ABSOLUTE: 0 K/UL (ref 0–0.2)
BASOPHILS RELATIVE PERCENT: 0.5 %
BUN BLDV-MCNC: 5 MG/DL (ref 7–20)
CALCIUM SERPL-MCNC: 8.8 MG/DL (ref 8.3–10.6)
CHLORIDE BLD-SCNC: 101 MMOL/L (ref 99–110)
CO2: 24 MMOL/L (ref 21–32)
CREAT SERPL-MCNC: <0.5 MG/DL (ref 0.9–1.3)
EOSINOPHILS ABSOLUTE: 0.2 K/UL (ref 0–0.6)
EOSINOPHILS RELATIVE PERCENT: 3.3 %
GFR AFRICAN AMERICAN: >60
GFR NON-AFRICAN AMERICAN: >60
GLUCOSE BLD-MCNC: 162 MG/DL (ref 70–99)
GLUCOSE BLD-MCNC: 169 MG/DL (ref 70–99)
GLUCOSE BLD-MCNC: 171 MG/DL (ref 70–99)
GLUCOSE BLD-MCNC: 178 MG/DL (ref 70–99)
GLUCOSE BLD-MCNC: 216 MG/DL (ref 70–99)
HCT VFR BLD CALC: 35.8 % (ref 40.5–52.5)
HEMOGLOBIN: 12.4 G/DL (ref 13.5–17.5)
LYMPHOCYTES ABSOLUTE: 1.1 K/UL (ref 1–5.1)
LYMPHOCYTES RELATIVE PERCENT: 15.6 %
MCH RBC QN AUTO: 30.7 PG (ref 26–34)
MCHC RBC AUTO-ENTMCNC: 34.6 G/DL (ref 31–36)
MCV RBC AUTO: 88.8 FL (ref 80–100)
MONOCYTES ABSOLUTE: 0.6 K/UL (ref 0–1.3)
MONOCYTES RELATIVE PERCENT: 8.6 %
NEUTROPHILS ABSOLUTE: 5.2 K/UL (ref 1.7–7.7)
NEUTROPHILS RELATIVE PERCENT: 72 %
PDW BLD-RTO: 12.2 % (ref 12.4–15.4)
PERFORMED ON: ABNORMAL
PLATELET # BLD: 270 K/UL (ref 135–450)
PMV BLD AUTO: 7.5 FL (ref 5–10.5)
POTASSIUM REFLEX MAGNESIUM: 3.6 MMOL/L (ref 3.5–5.1)
RBC # BLD: 4.04 M/UL (ref 4.2–5.9)
SARS-COV-2, NAAT: NOT DETECTED
SODIUM BLD-SCNC: 136 MMOL/L (ref 136–145)
WBC # BLD: 7.2 K/UL (ref 4–11)

## 2022-05-11 PROCEDURE — 6360000002 HC RX W HCPCS: Performed by: INTERNAL MEDICINE

## 2022-05-11 PROCEDURE — 6370000000 HC RX 637 (ALT 250 FOR IP): Performed by: INTERNAL MEDICINE

## 2022-05-11 PROCEDURE — 87635 SARS-COV-2 COVID-19 AMP PRB: CPT

## 2022-05-11 PROCEDURE — 2580000003 HC RX 258: Performed by: INTERNAL MEDICINE

## 2022-05-11 PROCEDURE — 99233 SBSQ HOSP IP/OBS HIGH 50: CPT | Performed by: INTERNAL MEDICINE

## 2022-05-11 PROCEDURE — 6370000000 HC RX 637 (ALT 250 FOR IP): Performed by: NURSE PRACTITIONER

## 2022-05-11 PROCEDURE — 36415 COLL VENOUS BLD VENIPUNCTURE: CPT

## 2022-05-11 PROCEDURE — 1200000000 HC SEMI PRIVATE

## 2022-05-11 PROCEDURE — 94760 N-INVAS EAR/PLS OXIMETRY 1: CPT

## 2022-05-11 PROCEDURE — 86480 TB TEST CELL IMMUN MEASURE: CPT

## 2022-05-11 PROCEDURE — 80048 BASIC METABOLIC PNL TOTAL CA: CPT

## 2022-05-11 PROCEDURE — 85025 COMPLETE CBC W/AUTO DIFF WBC: CPT

## 2022-05-11 RX ADMIN — INSULIN GLARGINE 20 UNITS: 100 INJECTION, SOLUTION SUBCUTANEOUS at 21:32

## 2022-05-11 RX ADMIN — PIPERACILLIN AND TAZOBACTAM 4500 MG: 4; .5 INJECTION, POWDER, LYOPHILIZED, FOR SOLUTION INTRAVENOUS at 12:24

## 2022-05-11 RX ADMIN — ACETAMINOPHEN 650 MG: 325 TABLET ORAL at 21:01

## 2022-05-11 RX ADMIN — LINEZOLID 600 MG: 600 INJECTION, SOLUTION INTRAVENOUS at 20:57

## 2022-05-11 RX ADMIN — INSULIN LISPRO 1 UNITS: 100 INJECTION, SOLUTION INTRAVENOUS; SUBCUTANEOUS at 21:32

## 2022-05-11 RX ADMIN — OXYCODONE 5 MG: 5 TABLET ORAL at 16:13

## 2022-05-11 RX ADMIN — INSULIN LISPRO 4 UNITS: 100 INJECTION, SOLUTION INTRAVENOUS; SUBCUTANEOUS at 12:24

## 2022-05-11 RX ADMIN — FLUCONAZOLE 200 MG: 2 INJECTION, SOLUTION INTRAVENOUS at 22:49

## 2022-05-11 RX ADMIN — PIPERACILLIN AND TAZOBACTAM 4500 MG: 4; .5 INJECTION, POWDER, LYOPHILIZED, FOR SOLUTION INTRAVENOUS at 20:58

## 2022-05-11 RX ADMIN — IBUPROFEN 400 MG: 400 TABLET ORAL at 18:59

## 2022-05-11 RX ADMIN — OXYCODONE 5 MG: 5 TABLET ORAL at 05:20

## 2022-05-11 RX ADMIN — LINEZOLID 600 MG: 600 INJECTION, SOLUTION INTRAVENOUS at 09:20

## 2022-05-11 RX ADMIN — INSULIN LISPRO 2 UNITS: 100 INJECTION, SOLUTION INTRAVENOUS; SUBCUTANEOUS at 18:56

## 2022-05-11 RX ADMIN — OXYCODONE 5 MG: 5 TABLET ORAL at 22:46

## 2022-05-11 RX ADMIN — ENOXAPARIN SODIUM 40 MG: 100 INJECTION SUBCUTANEOUS at 07:58

## 2022-05-11 RX ADMIN — INSULIN LISPRO 2 UNITS: 100 INJECTION, SOLUTION INTRAVENOUS; SUBCUTANEOUS at 08:04

## 2022-05-11 RX ADMIN — IBUPROFEN 400 MG: 400 TABLET ORAL at 07:59

## 2022-05-11 RX ADMIN — PIPERACILLIN AND TAZOBACTAM 4500 MG: 4; .5 INJECTION, POWDER, LYOPHILIZED, FOR SOLUTION INTRAVENOUS at 04:00

## 2022-05-11 RX ADMIN — SODIUM CHLORIDE, PRESERVATIVE FREE 10 ML: 5 INJECTION INTRAVENOUS at 09:24

## 2022-05-11 ASSESSMENT — PAIN SCALES - GENERAL
PAINLEVEL_OUTOF10: 7
PAINLEVEL_OUTOF10: 7
PAINLEVEL_OUTOF10: 5
PAINLEVEL_OUTOF10: 5
PAINLEVEL_OUTOF10: 7
PAINLEVEL_OUTOF10: 8

## 2022-05-11 ASSESSMENT — PAIN DESCRIPTION - DESCRIPTORS: DESCRIPTORS: ACHING

## 2022-05-11 ASSESSMENT — PAIN SCALES - WONG BAKER: WONGBAKER_NUMERICALRESPONSE: 4

## 2022-05-11 ASSESSMENT — PAIN DESCRIPTION - LOCATION
LOCATION: GROIN
LOCATION: GROIN

## 2022-05-11 ASSESSMENT — PAIN DESCRIPTION - ORIENTATION
ORIENTATION: RIGHT

## 2022-05-11 NOTE — PROGRESS NOTES
Diabetes mellitus ([facility] [address]        Past Surgical History:    History reviewed. No pertinent surgical history. Current Medications:    Outpatient Medications Marked as Taking for the 5/6/22 encounter [facility] Encounter)   Medication Sig Dispense Refill    ciprofloxacin (CIPRO) 250 MG tablet Take 250 mg by mouth 2 times daily For 14 days      ibuprofen (ADVIL;MOTRIN) 600 MG tablet Take 600 mg by mouth every 6 hours as needed for Pain         Allergies:  Patient has no known allergies. Immunizations : There is no immunization history on file for this patient. Social History:    Social History     Tobacco Use    Smoking status: Never Smoker    Smokeless tobacco: Never Used   Substance Use Topics    Alcohol use: No    Drug use: No     Social History     Tobacco Use   Smoking Status Never Smoker   Smokeless Tobacco Never Used      Family History : no DVT no COPD       REVIEW OF SYSTEMS:     Constitutional: fevers + , chillS+  night sweats  Eyes:  negative for blurred vision, eye discharge, visual disturbance   HEENT:  negative for hearing loss, ear drainage,nasal congestion  Respiratory:  negative for cough, shortness of breath or hemoptysis   Cardiovascular:  negative for chest pain, palpitations, syncope  Gastrointestinal:  negative for nausea, vomiting, diarrhea, constipation, abdominal pain  Genitourinary:  negative for frequency, dysuria, urinary incontinence, hematuria SCROTAL PAIN + SWELLING+   Hematologic/Lymphatic:  negative for easy bruising, bleeding and lymphadenopathy  Allergic/Immunologic:  negative for recurrent infections, angioedema, anaphylaxis   Endocrine:  negative for weight changes, polyuria, polydipsia and polyphagia  Musculoskeletal:  negative for joint  pain, swelling, decreased range of motion  Integumentary: No rashes, skin lesions  Neurological:  negative for headaches, slurred speech, unilateral weakness  Psychiatric: negative for hallucinations,confusion,agitation. PHYSICAL EXAM:      Vitals:    /76   Pulse 67   Temp 98.4 °F (36.9 °C) (Oral)   Resp 16   Ht 5' 9\" (1.753 m)   Wt 177 lb 0.5 oz (80.3 kg)   SpO2 96%   BMI 26.14 kg/m²     General Appearance: alert,in some  acute distress, no pallor, no icterus   Skin: warm and dry, no rash or erythema  Head: normocephalic and atraumatic  Eyes: pupils equal, round, and reactive to light, conjunctivae normal  ENT: tympanic membrane, external ear and ear canal normal bilaterally, nose without deformity, nasal mucosa and turbinates normal without polyps  Neck: supple and non-tender without mass, no thyromegaly  no cervical lymphadenopathy  Pulmonary/Chest: clear to auscultation bilaterally- no wheezes, rales or rhonchi, normal air movement, no respiratory distress  Cardiovascular: normal rate, regular rhythm, normal S1 and S2, no murmurs, rubs, clicks, or gallops, no carotid bruits  Abdomen: soft, non-tender, non-distended, normal bowel sounds, no masses or organomegaly  Extremities: no cyanosis, clubbing or edema  Musculoskeletal: normal range of motion, no joint swelling, deformity or tenderness  Integumentary: No rashes, no abnormal skin lesions, no petechiae  Neurologic: reflexes normal and symmetric, no cranial nerve deficit  Psych:  Orientation, sensorium, mood normal            Lines:IV  Rt groin inguinal area on going swelling local pain redness some improvement with cellulitis and Rt scrotal swelling and pain no skin changes         Data Review:    CBC:   Lab Results   Component Value Date    WBC 7.2 05/11/2022    HGB 12.4 (L) 05/11/2022    HCT 35.8 (L) 05/11/2022    MCV 88.8 05/11/2022     05/11/2022     RENAL:   Lab Results   Component Value Date    CREATININE <0.5 (L) 05/11/2022    BUN 5 (L) 05/11/2022     05/11/2022    K 3.6 05/11/2022     05/11/2022    CO2 24 05/11/2022     SED RATE:   Lab Results   Component Value Date    SEDRATE 84 05/07/2022     CK: No results found for: CKTOTAL  CRP: Lab Results   Component Value Date    .4 05/07/2022     Hepatic Function Panel:   Lab Results   Component Value Date    ALKPHOS 182 05/06/2022    ALT 11 05/06/2022    AST 14 05/06/2022    PROT 8.0 05/06/2022    BILITOT 1.2 05/06/2022    LABALBU 3.7 05/06/2022     UA:  Lab Results   Component Value Date    COLORU Yellow 05/06/2022    CLARITYU Clear 05/06/2022    GLUCOSEU >=1000 05/06/2022    BILIRUBINUR Negative 05/06/2022    KETUA TRACE 05/06/2022    SPECGRAV 1.052 05/06/2022    BLOODU Negative 05/06/2022    PHUR 6.5 05/06/2022    PROTEINU Negative 05/06/2022    UROBILINOGEN 1.0 05/06/2022    NITRU Negative 05/06/2022    LEUKOCYTESUR Negative 05/06/2022    LABMICR Not Indicated 05/06/2022    URINETYPE NotGiven 05/06/2022      Urine Microscopic:   Lab Results   Component Value Date    BACTERIA Rare 02/27/2014    HYALCAST 1 04/13/2022    WBCUA 397 04/13/2022    RBCUA 20 04/13/2022    EPIU 2 04/13/2022     Urine Reflex to Culture:   Lab Results   Component Value Date    URRFLXCULT Not Indicated 05/06/2022         MICRO: cultures reviewed and updated by me   Blood Culture:   Lab Results   Component Value Date    BC No Growth after 4 days of incubation. 05/06/2022    BLOODCULT2 No Growth after 4 days of incubation.  05/06/2022     Component 4/13/22 1438   Organism Klebsiella pneumoniae Abnormal     Urine Culture, Routine 25,000 CFU/ml    Resulting Agency [address] [facility]            Susceptibility        Klebsiella pneumoniae (1)     Antibiotic Interpretation Microscan   Method Status     amoxicillin-clavulanate Sensitive <=8/4 mcg/mL BACTERIAL SUSCEPTIBILITY PANEL BY KRISTI       ampicillin Resistant >16 mcg/mL BACTERIAL SUSCEPTIBILITY PANEL BY KRISTI       ampicillin-sulbactam Sensitive <=8/4 mcg/mL BACTERIAL SUSCEPTIBILITY PANEL BY KRISTI       ceFAZolin Sensitive <=2 mcg/mL BACTERIAL SUSCEPTIBILITY PANEL BY KRISTI         NOTE: Cefazolin should only be used for uncomplicated UTI         for E.coli or Klebsiella pneumoniae.           cefepime Sensitive <=2 mcg/mL BACTERIAL SUSCEPTIBILITY PANEL BY KRISTI       cefTRIAXone Sensitive <=1 mcg/mL BACTERIAL SUSCEPTIBILITY PANEL BY KRISTI       cefuroxime Sensitive <=4 mcg/mL BACTERIAL SUSCEPTIBILITY PANEL BY KRISTI       ciprofloxacin Sensitive <=1 mcg/mL BACTERIAL SUSCEPTIBILITY PANEL BY KRISTI       ertapenem Sensitive <=0.5 mcg/mL BACTERIAL SUSCEPTIBILITY PANEL BY KRISTI       gentamicin Sensitive <=4 mcg/mL BACTERIAL SUSCEPTIBILITY PANEL BY KRISTI       meropenem Sensitive <=1 mcg/mL BACTERIAL SUSCEPTIBILITY PANEL BY KRISTI       nitrofurantoin Resistant >64 mcg/mL BACTERIAL SUSCEPTIBILITY PANEL BY KRISTI       piperacillin-tazobactam Sensitive <=16 mcg/mL BACTERIAL SUSCEPTIBILITY PANEL BY KRISTI       trimethoprim-sulfamethoxazole Sensitive <=2/38 mcg/mL BACTERIAL SUSCEPTIBILITY PANEL BY KRISTI             Narrative  Performed by: [facility]  ORDER#: [id number]                          ORDERED BY: Robin Levin   SOURCE: Urine Clean Catch                  COLLECTED:  04/13/22                Procedure Component Value Units Date/Time   Culture, Blood 2 [[id number]] Collected: 05/06/22 1359   Order Status: Sent Specimen: Blood Updated: 05/06/22 1420   Culture, Blood 1 [[id number]] Collected: 05/06/22 1359   Order Status: Sent Specimen: Blood Updated: 05/06/22 1420       Respiratory Culture:  No results found for: Trina July  AFB:No results found for: [facility]  Viral Culture:  Lab Results   Component Value Date    COVID19 Not Detected 05/11/2022     Urine Culture:   No results for input(s): Faraz Song in the last 72 hours. IMAGING:    CT ABDOMEN PELVIS W IV CONTRAST Additional Contrast? None   Final Result   Worsening right epididymal orchitis as described above with a small fluid   collection in the pelvis as measured above.              CT ABDOMEN PELVIS W IV CONTRAST Additional Contrast? None   Final Result   Worsening right epididymal orchitis as described above with a small fluid me wonder if he has not Taken his MEDS  Due to Lack of INSURANCE or due to Uncontrolled DM he is having complications     As this is some what unusual if Klebsiella is the Culprit Organism was sensitive to Bactrim, Cipro  Etc       He has made minimal progress on broad spectrum IV abx and most likely needs surgery will d/w Urology about their plans    Urology following closely is a plan for possible surgical intervention we will request operative cultures including fungal and AFB    Check TB QuantiFERON - and He is BCG vaccinated       Labs, Microbiology, Radiology and all the pertinent results from current hospitalization and  care every where were reviewed  by me as a part of the evaluation   Plan:   1. IV Zosyn x 4.5 gm Q 8hrs HIGH dose   2. Cont   IV Linezolid x 600 mg q 12 hrs  3. Cont  IV Fluconazole x 400 mg   4. Control DM    5. HbA1C very elevated    6. bLOOD CX in process  NGDT  7. HIV -ve, Hepatitis, Gonorrhea -ve, Chlamydia -ve, Syphilis screen  -ve  8. ESR. CRP  9. Social service assistance for antibiotic help  10. Per  Urology team going to OR tomorrow ? 11. Please send OR cx for Af[employer], fUNGAL routine cx       Discussed with patient/Family and Nursing   Risk of Complications/Morbidity: High      · Illness(es)/ Infection present that pose threat to bodily function. · There is potential for severe exacerbation of infection/side effects of treatment. · Therapy requires intensive monitoring for antimicrobial agent toxicity        Discussed with patient/Family and Nursing staff     Thanks for allowing me to participate in your patient's care and please call me with any questions or concerns.     Stevie Medrano MD  Infectious Disease  [facility]) Physician  Phone: [phone]   Fax : [phone]

## 2022-05-11 NOTE — PROGRESS NOTES
Hospitalist Progress Note      PCP: No primary care provider on file. Chief Complaint. Patient is a 71-year-old male with remote past medical history of diabetes mellitus but not taking any medications who presents to the hospital for right scrotum pain and groin pain. According to the patient it can go up to 10/10 intensity, radiates to right groin, associated with swelling in his groin, he mentions he has only 1 sexual partner, his partner does not have any STD at this time, he denies previous history of STDs, he is not sure of any exposure to HIV. Patient denied fevers chills chest pain nausea vomiting diarrhea dysuria. Patient mentions he was recently started on oral antibiotics as outpatient but the swelling has been worsening so he decided to come to the hospital at the request of urology.     Date of Admission: 5/6/2022    Subjective: no significant improvement, denies chest pain, nausea, vomiting, shortness of breath, fever or chills    Medications:  Reviewed    Infusion Medications    sodium chloride 5 mL/hr at 05/10/22 1224    dextrose      sodium chloride 75 mL/hr at 05/10/22 2140     Scheduled Medications    fluconazole  200 mg IntraVENous Q24H    insulin glargine  0.25 Units/kg SubCUTAneous Nightly    sodium chloride flush  10 mL IntraVENous 2 times per day    enoxaparin  40 mg SubCUTAneous Daily    piperacillin-tazobactam  4,500 mg IntraVENous Q8H    insulin lispro  0-12 Units SubCUTAneous TID WC    insulin lispro  0-6 Units SubCUTAneous Nightly    linezolid  600 mg IntraVENous Q12H     PRN Meds: ibuprofen, sodium chloride flush, sodium chloride, potassium chloride **OR** potassium alternative oral replacement **OR** potassium chloride, potassium chloride, magnesium sulfate, promethazine **OR** ondansetron, magnesium hydroxide, acetaminophen **OR** acetaminophen, glucose, dextrose, glucagon (rDNA), dextrose, oxyCODONE      Intake/Output Summary (Last 24 hours) at 5/11/2022 430 [facility] filed at 5/11/2022 1444  Gross per 24 hour   Intake 1320 ml   Output --   Net 1320 ml       Physical Exam Performed:    /76   Pulse 76   Temp 99.1 °F (37.3 °C) (Oral)   Resp 20   Ht 5' 9\" (1.753 m)   Wt 177 lb 0.5 oz (80.3 kg)   SpO2 97%   BMI 26.14 kg/m²     General appearance: NAD  HEENT:  Conjunctivae/corneas clear. Neck: Supple, with full range of motion. Respiratory:  Normal respiratory effort. Clear to auscultation, bilaterally without Rales/Wheezes/Rhonchi. Cardiovascular: Regular rate and rhythm with normal S1/S2 without murmurs or rubs  Abdomen: Soft, non-tender, non-distended, normal bowel sounds. Musculoskeletal: No cyanosis or edema bilaterally  Neurologic:  without any focal sensory/motor deficits. grossly non-focal.  Psychiatric: Alert and oriented, Normal mood  Peripheral Pulses: +2 palpable, equal bilaterally       Labs:   Recent Labs     05/11/22  0732   WBC 7.2   HGB 12.4*   HCT 35.8*        Recent Labs     05/09/22  0722 05/10/22  0658 05/11/22  0732   * 136 136   K 4.0 3.8 3.6    101 101   CO2 23 23 24   BUN 7 5* 5*   CREATININE <0.5* <0.5* <0.5*   CALCIUM 8.9 8.7 8.8     No results for input(s): AST, ALT, BILIDIR, BILITOT, ALKPHOS in the last 72 hours. No results for input(s): INR in the last 72 hours. No results for input(s): Niko Seed in the last 72 hours. Urinalysis:      Lab Results   Component Value Date    NITRU Negative 05/06/2022    WBCUA 397 04/13/2022    BACTERIA Rare 02/27/2014    RBCUA 20 04/13/2022    BLOODU Negative 05/06/2022    SPECGRAV 1.052 05/06/2022    GLUCOSEU >=1000 05/06/2022       Radiology:  CT ABDOMEN PELVIS W IV CONTRAST Additional Contrast? None   Final Result   Worsening right epididymal orchitis as described above with a small fluid   collection in the pelvis as measured above.                Assessment/Plan:    Active Hospital Problems    Diagnosis     Abdominal pain [R10.9]      Priority: Medium Patient is a 58-year-old male with remote past medical history of diabetes mellitus but not taking any medications who presents to the hospital for right scrotum pain and groin pain. According to the patient it can go up to 10/10 intensity, radiates to right groin, associated with swelling in his groin, he mentions he has only 1 sexual partner, his partner does not have any STD at this time, he denies previous history of STDs, he is not sure of any exposure to HIV. Patient denied fevers chills chest pain nausea vomiting diarrhea dysuria. Patient mentions he was recently started on oral antibiotics as outpatient but the swelling has been worsening so he decided to come to the hospital at the request of urology.     Assessment  Worsening right epididymal orchitis  history of diabetes mellitus, medication nonadherence  Hyponatremia  Lactic acidosis  Sepsis present on admission likely secondary to #1        Plan  Infectious disease consulted, antibiotics per ID, continue IV antibiotics per ID  Check blood cultures-negative to date, urine culture-negative lactate  Urology following, no surgical plan at this time, if fails to improve on antibiotics plan for right orchiectomy  Continue IV fluid therapy with normal saline  Start weight-based long-acting insulin, insulin sliding scale, increase long-acting insulin, A1c is 12  Diet: ADULT DIET;  Regular; 4 carb choices (60 gm/meal)  Diet NPO  Code Status: Full Code    PT/OT Eval Status: ordered    Dispo -possible plan for OR per urology    Anuradha Osuna MD

## 2022-05-11 NOTE — PROGRESS NOTES
Pt Name: Pras[facility] Record Number: [id number]  Date of Birth 1985   Today's Date: 5/11/2022      Subjective: Stable overnight, reports pain improving. Exam without changes. ROS: Constitutional: Afebrile    Vitals:  Vitals:    05/10/22 1649 05/10/22 2213 05/11/22 0515 05/11/22 0603   BP: 126/81 (!) 154/75 128/81    Pulse: 61 84 75    Resp: 18 19 20    Temp: 98.5 °F (36.9 °C) 99.4 °F (37.4 °C) 98 °F (36.7 °C)    TempSrc: Oral Oral Oral    SpO2: 96% 97% 96%    Weight:    177 lb 0.5 oz (80.3 kg)   Height:         I/O last 3 completed shifts: In: 18 [P.O.:1560]  Out: 550 [Urine:550]    Exam:  General: Awake, oriented, no acute distress  Respiratory: Nonlabored breathing  Abdomen: Soft, non-tender, non-distended, no masses  : Right testicle swelling, indurated and firm, some erythema noted into inguinal canal. Right spermatic cord unchanged.  No fluctuance  Skin: Skin color, texture, turgor normal, no rashes or lesions  Neurologic: no gross deficits    CURRENT MEDICATIONS   Scheduled Meds:   fluconazole  200 mg IntraVENous Q24H    insulin glargine  0.25 Units/kg SubCUTAneous Nightly    sodium chloride flush  10 mL IntraVENous 2 times per day    enoxaparin  40 mg SubCUTAneous Daily    piperacillin-tazobactam  4,500 mg IntraVENous Q8H    insulin lispro  0-12 Units SubCUTAneous TID     insulin lispro  0-6 Units SubCUTAneous Nightly    linezolid  600 mg IntraVENous Q12H     Continuous Infusions:   sodium chloride 5 mL/hr at 05/10/22 1224    dextrose      sodium chloride 75 mL/hr at 05/10/22 2140     PRN Meds:.ibuprofen, sodium chloride flush, sodium chloride, potassium chloride **OR** potassium alternative oral replacement **OR** potassium chloride, potassium chloride, magnesium sulfate, promethazine **OR** ondansetron, magnesium hydroxide, acetaminophen **OR** acetaminophen, glucose, dextrose, glucagon (rDNA), dextrose, oxyCODONE    LABS     Recent Labs     05/09/22  4897 05/10/22  0658   * 136   K 4.0 3.8    101   CO2 23 23   BUN 7 5*   CREATININE <0.5* <0.5*   CALCIUM 8.9 8.7         ASSESSMENT   1. Hospital day # 5  2. Severe right epididymal orchitis. CT with possible small abscess along spermatic cord at the internal ring. not responding to outpatient therapy. Cultures from admission no growth to date. Slight 10 to 20% improvement with systemic antibiotic therapy after 5 days. PLAN   1. There has been slight improvement but no significant changes and induration of the spermatic cord. Right orchiectomy tomorrow with Dr. Yenni Gold. 2. Covid pending  3.  NPO after midnight    ISAAK Frederick CNP 5/11/2022 9:03 AM

## 2022-05-11 NOTE — PLAN OF CARE
Problem: Discharge Planning  Goal: Discharge to home or other facility with appropriate resources  Outcome: Progressing     Problem: Pain  Goal: Verbalizes/displays adequate comfort level or baseline comfort level  5/11/2022 0930 by Guru Pereira RN  Outcome: Progressing  5/11/2022 0502 by Ulis Burkitt, RN  Outcome: Progressing     Problem: Safety - Adult  Goal: Free from fall injury  Outcome: Progressing     Problem: Skin/Tissue Integrity  Goal: Absence of new skin breakdown  Description: 1. Monitor for areas of redness and/or skin breakdown  2. Assess vascular access sites hourly  3. Every 4-6 hours minimum:  Change oxygen saturation probe site  4. Every 4-6 hours:  If on nasal continuous positive airway pressure, respiratory therapy assess nares and determine need for appliance change or resting period.   5/11/2022 0930 by Guru Pereira RN  Outcome: Progressing  5/11/2022 0502 by Ulis Burkitt, RN  Outcome: Progressing     Problem: ABCDS Injury Assessment  Goal: Absence of physical injury  Outcome: Progressing     Problem: Chronic Conditions and Co-morbidities  Goal: Patient's chronic conditions and co-morbidity symptoms are monitored and maintained or improved  5/11/2022 0930 by Guru Pereira RN  Outcome: Progressing  5/11/2022 0502 by Ulis Burkitt, RN  Outcome: Progressing

## 2022-05-12 ENCOUNTER — ANESTHESIA EVENT (OUTPATIENT)
Dept: OPERATING ROOM | Age: 37
DRG: 710 | End: 2022-05-12
Payer: MEDICAID

## 2022-05-12 ENCOUNTER — ANESTHESIA (OUTPATIENT)
Dept: OPERATING ROOM | Age: 37
DRG: 710 | End: 2022-05-12
Payer: MEDICAID

## 2022-05-12 LAB
ANION GAP SERPL CALCULATED.3IONS-SCNC: 11 MMOL/L (ref 3–16)
BASOPHILS ABSOLUTE: 0.1 K/UL (ref 0–0.2)
BASOPHILS RELATIVE PERCENT: 0.9 %
BUN BLDV-MCNC: 5 MG/DL (ref 7–20)
CALCIUM SERPL-MCNC: 9.2 MG/DL (ref 8.3–10.6)
CHLORIDE BLD-SCNC: 103 MMOL/L (ref 99–110)
CO2: 23 MMOL/L (ref 21–32)
CREAT SERPL-MCNC: <0.5 MG/DL (ref 0.9–1.3)
EOSINOPHILS ABSOLUTE: 0.2 K/UL (ref 0–0.6)
EOSINOPHILS RELATIVE PERCENT: 2.7 %
GFR AFRICAN AMERICAN: >60
GFR NON-AFRICAN AMERICAN: >60
GLUCOSE BLD-MCNC: 156 MG/DL (ref 70–99)
GLUCOSE BLD-MCNC: 167 MG/DL (ref 70–99)
GLUCOSE BLD-MCNC: 177 MG/DL (ref 70–99)
GLUCOSE BLD-MCNC: 198 MG/DL (ref 70–99)
GLUCOSE BLD-MCNC: 215 MG/DL (ref 70–99)
GLUCOSE BLD-MCNC: 219 MG/DL (ref 70–99)
HCT VFR BLD CALC: 36.9 % (ref 40.5–52.5)
HEMOGLOBIN: 12.6 G/DL (ref 13.5–17.5)
LYMPHOCYTES ABSOLUTE: 1.4 K/UL (ref 1–5.1)
LYMPHOCYTES RELATIVE PERCENT: 17.4 %
MCH RBC QN AUTO: 30.7 PG (ref 26–34)
MCHC RBC AUTO-ENTMCNC: 34.1 G/DL (ref 31–36)
MCV RBC AUTO: 89.9 FL (ref 80–100)
MONOCYTES ABSOLUTE: 0.8 K/UL (ref 0–1.3)
MONOCYTES RELATIVE PERCENT: 10 %
NEUTROPHILS ABSOLUTE: 5.4 K/UL (ref 1.7–7.7)
NEUTROPHILS RELATIVE PERCENT: 69 %
PDW BLD-RTO: 12.3 % (ref 12.4–15.4)
PERFORMED ON: ABNORMAL
PLATELET # BLD: 292 K/UL (ref 135–450)
PMV BLD AUTO: 6.9 FL (ref 5–10.5)
POTASSIUM REFLEX MAGNESIUM: 3.7 MMOL/L (ref 3.5–5.1)
RBC # BLD: 4.1 M/UL (ref 4.2–5.9)
SODIUM BLD-SCNC: 137 MMOL/L (ref 136–145)
WBC # BLD: 7.8 K/UL (ref 4–11)

## 2022-05-12 PROCEDURE — 94760 N-INVAS EAR/PLS OXIMETRY 1: CPT

## 2022-05-12 PROCEDURE — 80048 BASIC METABOLIC PNL TOTAL CA: CPT

## 2022-05-12 PROCEDURE — 6360000002 HC RX W HCPCS: Performed by: INTERNAL MEDICINE

## 2022-05-12 PROCEDURE — 99233 SBSQ HOSP IP/OBS HIGH 50: CPT | Performed by: INTERNAL MEDICINE

## 2022-05-12 PROCEDURE — 85025 COMPLETE CBC W/AUTO DIFF WBC: CPT

## 2022-05-12 PROCEDURE — 1200000000 HC SEMI PRIVATE

## 2022-05-12 PROCEDURE — 6370000000 HC RX 637 (ALT 250 FOR IP): Performed by: INTERNAL MEDICINE

## 2022-05-12 PROCEDURE — 2580000003 HC RX 258: Performed by: INTERNAL MEDICINE

## 2022-05-12 PROCEDURE — 6370000000 HC RX 637 (ALT 250 FOR IP): Performed by: NURSE PRACTITIONER

## 2022-05-12 PROCEDURE — 36415 COLL VENOUS BLD VENIPUNCTURE: CPT

## 2022-05-12 RX ADMIN — LINEZOLID 600 MG: 600 INJECTION, SOLUTION INTRAVENOUS at 20:56

## 2022-05-12 RX ADMIN — SODIUM CHLORIDE 25 ML: 9 INJECTION, SOLUTION INTRAVENOUS at 12:17

## 2022-05-12 RX ADMIN — SODIUM CHLORIDE: 9 INJECTION, SOLUTION INTRAVENOUS at 12:10

## 2022-05-12 RX ADMIN — OXYCODONE 5 MG: 5 TABLET ORAL at 12:15

## 2022-05-12 RX ADMIN — FLUCONAZOLE 200 MG: 2 INJECTION, SOLUTION INTRAVENOUS at 23:31

## 2022-05-12 RX ADMIN — SODIUM CHLORIDE: 9 INJECTION, SOLUTION INTRAVENOUS at 23:29

## 2022-05-12 RX ADMIN — INSULIN GLARGINE 20 UNITS: 100 INJECTION, SOLUTION SUBCUTANEOUS at 20:58

## 2022-05-12 RX ADMIN — INSULIN LISPRO 2 UNITS: 100 INJECTION, SOLUTION INTRAVENOUS; SUBCUTANEOUS at 20:58

## 2022-05-12 RX ADMIN — OXYCODONE 5 MG: 5 TABLET ORAL at 23:28

## 2022-05-12 RX ADMIN — OXYCODONE 5 MG: 5 TABLET ORAL at 05:56

## 2022-05-12 RX ADMIN — INSULIN LISPRO 4 UNITS: 100 INJECTION, SOLUTION INTRAVENOUS; SUBCUTANEOUS at 17:28

## 2022-05-12 RX ADMIN — OXYCODONE 5 MG: 5 TABLET ORAL at 17:30

## 2022-05-12 RX ADMIN — SODIUM CHLORIDE: 9 INJECTION, SOLUTION INTRAVENOUS at 10:53

## 2022-05-12 RX ADMIN — PIPERACILLIN AND TAZOBACTAM 4500 MG: 4; .5 INJECTION, POWDER, LYOPHILIZED, FOR SOLUTION INTRAVENOUS at 12:19

## 2022-05-12 RX ADMIN — LINEZOLID 600 MG: 600 INJECTION, SOLUTION INTRAVENOUS at 08:35

## 2022-05-12 RX ADMIN — PIPERACILLIN AND TAZOBACTAM 4500 MG: 4; .5 INJECTION, POWDER, LYOPHILIZED, FOR SOLUTION INTRAVENOUS at 05:56

## 2022-05-12 RX ADMIN — PIPERACILLIN AND TAZOBACTAM 4500 MG: 4; .5 INJECTION, POWDER, LYOPHILIZED, FOR SOLUTION INTRAVENOUS at 20:57

## 2022-05-12 RX ADMIN — IBUPROFEN 400 MG: 400 TABLET ORAL at 23:27

## 2022-05-12 RX ADMIN — IBUPROFEN 400 MG: 400 TABLET ORAL at 17:27

## 2022-05-12 ASSESSMENT — PAIN SCALES - GENERAL
PAINLEVEL_OUTOF10: 4
PAINLEVEL_OUTOF10: 7
PAINLEVEL_OUTOF10: 3
PAINLEVEL_OUTOF10: 9
PAINLEVEL_OUTOF10: 6
PAINLEVEL_OUTOF10: 4
PAINLEVEL_OUTOF10: 3
PAINLEVEL_OUTOF10: 3
PAINLEVEL_OUTOF10: 0

## 2022-05-12 ASSESSMENT — PAIN DESCRIPTION - ORIENTATION: ORIENTATION: RIGHT

## 2022-05-12 ASSESSMENT — PAIN - FUNCTIONAL ASSESSMENT
PAIN_FUNCTIONAL_ASSESSMENT: 0-10
PAIN_FUNCTIONAL_ASSESSMENT: PREVENTS OR INTERFERES SOME ACTIVE ACTIVITIES AND ADLS
PAIN_FUNCTIONAL_ASSESSMENT: ACTIVITIES ARE NOT PREVENTED

## 2022-05-12 ASSESSMENT — PAIN DESCRIPTION - LOCATION: LOCATION: SCROTUM

## 2022-05-12 ASSESSMENT — PAIN DESCRIPTION - DESCRIPTORS: DESCRIPTORS: ACHING

## 2022-05-12 ASSESSMENT — PAIN SCALES - WONG BAKER: WONGBAKER_NUMERICALRESPONSE: 0

## 2022-05-12 NOTE — PLAN OF CARE
Problem: Pain  Goal: Verbalizes/displays adequate comfort level or baseline comfort level  5/12/2022 0905 by Katalina Beck RN  Outcome: Not Progressing     Problem: Discharge Planning  Goal: Discharge to home or other facility with appropriate resources  Outcome: Progressing     Problem: Safety - Adult  Goal: Free from fall injury  5/12/2022 0905 by Katalina Beck RN  Outcome: Progressing  Flowsheets (Taken 5/12/2022 0904)  Free From Fall Injury: Instruct family/caregiver on patient safety     Problem: Skin/Tissue Integrity  Goal: Absence of new skin breakdown  Description: 1. Monitor for areas of redness and/or skin breakdown  2. Assess vascular access sites hourly  3. Every 4-6 hours minimum:  Change oxygen saturation probe site  4. Every 4-6 hours:  If on nasal continuous positive airway pressure, respiratory therapy assess nares and determine need for appliance change or resting period.   5/12/2022 0905 by Katalina Beck RN  Outcome: Progressing     Problem: ABCDS Injury Assessment  Goal: Absence of physical injury  Outcome: Progressing  Flowsheets (Taken 5/12/2022 0904)  Absence of Physical Injury: Implement safety measures based on patient assessment     Problem: Chronic Conditions and Co-morbidities  Goal: Patient's chronic conditions and co-morbidity symptoms are monitored and maintained or improved  Outcome: Progressing

## 2022-05-12 NOTE — PROGRESS NOTES
Hospitalist Progress Note      PCP: No primary care provider on file. Chief Complaint. Patient is a 39-year-old male with remote past medical history of diabetes mellitus but not taking any medications who presents to the hospital for right scrotum pain and groin pain. According to the patient it can go up to 10/10 intensity, radiates to right groin, associated with swelling in his groin, he mentions he has only 1 sexual partner, his partner does not have any STD at this time, he denies previous history of STDs, he is not sure of any exposure to HIV. Patient denied fevers chills chest pain nausea vomiting diarrhea dysuria. Patient mentions he was recently started on oral antibiotics as outpatient but the swelling has been worsening so he decided to come to the hospital at the request of urology.     Date of Admission: 5/6/2022    Subjective: Surgery for today cancelled, plan for surgery tomorrow, denies chest pain, nausea, vomiting, shortness of breath, fever or chills    Medications:  Reviewed    Infusion Medications    sodium chloride 25 mL (05/12/22 1217)    dextrose      sodium chloride 75 mL/hr at 05/12/22 1210     Scheduled Medications    fluconazole  200 mg IntraVENous Q24H    insulin glargine  0.25 Units/kg SubCUTAneous Nightly    sodium chloride flush  10 mL IntraVENous 2 times per day    enoxaparin  40 mg SubCUTAneous Daily    piperacillin-tazobactam  4,500 mg IntraVENous Q8H    insulin lispro  0-12 Units SubCUTAneous TID WC    insulin lispro  0-6 Units SubCUTAneous Nightly    linezolid  600 mg IntraVENous Q12H     PRN Meds: ibuprofen, sodium chloride flush, sodium chloride, potassium chloride **OR** potassium alternative oral replacement **OR** potassium chloride, potassium chloride, magnesium sulfate, promethazine **OR** ondansetron, magnesium hydroxide, acetaminophen **OR** acetaminophen, glucose, dextrose, glucagon (rDNA), dextrose, oxyCODONE      Intake/Output Summary (Last 24 hours) at 5/12/2022 1759  Last data filed at 5/12/2022 0840  Gross per 24 hour   Intake 240 ml   Output 1200 ml   Net -960 ml       Physical Exam Performed:    /77   Pulse 71   Temp 99.2 °F (37.3 °C) (Oral)   Resp 18   Ht 5' 9\" (1.753 m)   Wt 175 lb 0.7 oz (79.4 kg)   SpO2 97%   BMI 25.85 kg/m²     General appearance: NAD, on RA  HEENT:  Conjunctivae/corneas clear. Neck: Supple, with full range of motion. Respiratory:  Normal respiratory effort. Clear to auscultation, bilaterally without Rales/Wheezes/Rhonchi. Cardiovascular: Regular rate and rhythm with normal S1/S2 without murmurs or rubs  Abdomen: Soft, non-tender, non-distended, normal bowel sounds. Musculoskeletal: No cyanosis or edema bilaterally  Neurologic:  without any focal sensory/motor deficits. grossly non-focal.  Psychiatric: Alert and oriented, Normal mood  Peripheral Pulses: +2 palpable, equal bilaterally       Labs:   Recent Labs     05/11/22  0732 05/12/22  0717   WBC 7.2 7.8   HGB 12.4* 12.6*   HCT 35.8* 36.9*    292     Recent Labs     05/10/22  0658 05/11/22  0732 05/12/22  0717    136 137   K 3.8 3.6 3.7    101 103   CO2 23 24 23   BUN 5* 5* 5*   CREATININE <0.5* <0.5* <0.5*   CALCIUM 8.7 8.8 9.2     No results for input(s): AST, ALT, BILIDIR, BILITOT, ALKPHOS in the last 72 hours. No results for input(s): INR in the last 72 hours. No results for input(s): Donchet Keens in the last 72 hours. Urinalysis:      Lab Results   Component Value Date    NITRU Negative 05/06/2022    WBCUA 397 04/13/2022    BACTERIA Rare 02/27/2014    RBCUA 20 04/13/2022    BLOODU Negative 05/06/2022    SPECGRAV 1.052 05/06/2022    GLUCOSEU >=1000 05/06/2022       Radiology:  CT ABDOMEN PELVIS W IV CONTRAST Additional Contrast? None   Final Result   Worsening right epididymal orchitis as described above with a small fluid   collection in the pelvis as measured above.                Assessment/Plan:    Active Hospital Problems Diagnosis     Abdominal pain [R10.9]      Priority: Medium     Patient is a 79-year-old male with remote past medical history of diabetes mellitus but not taking any medications who presents to the hospital for right scrotum pain and groin pain. According to the patient it can go up to 10/10 intensity, radiates to right groin, associated with swelling in his groin, he mentions he has only 1 sexual partner, his partner does not have any STD at this time, he denies previous history of STDs, he is not sure of any exposure to HIV. Patient denied fevers chills chest pain nausea vomiting diarrhea dysuria. Patient mentions he was recently started on oral antibiotics as outpatient but the swelling has been worsening so he decided to come to the hospital at the request of urology.     Assessment  Worsening right epididymal orchitis  history of diabetes mellitus, medication nonadherence  Hyponatremia  Lactic acidosis  Sepsis present on admission likely secondary to #1        Plan  Infectious disease consulted, antibiotics per ID, continue IV antibiotics per ID  Check blood cultures-negative to date, urine culture-negative lactate  Urology following, no surgical plan at this time, if fails to improve on antibiotics plan for right orchiectomy  Continue IV fluid therapy with normal saline  Start weight-based long-acting insulin, insulin sliding scale, increase long-acting insulin, A1c is 12  Diet: ADULT DIET;  Regular; 4 carb choices (60 gm/meal)  Diet NPO  Code Status: Full Code    PT/OT Eval Status: ordered    Dispo -plan for OR per urology tomorrow, continue IV abx    Priya Park MD

## 2022-05-12 NOTE — FLOWSHEET NOTE
Late entry  Pt prepped for surgery. A&Ox4. Consent signed and placed in soft chart. NPO since midnight. PIV x2 patent, IVF infusing to L FA. Gown changed. All belongings removed. Preop check list complete. Pt transferred off unit in bed.   Electronically signed by Nitin Mitchell RN on 5/12/2022 at 11:38 AM

## 2022-05-12 NOTE — PROGRESS NOTES
Right orchiectomy cancelled for today as Dr. Mary Arellano is still in the OR at [facility]. The surgery has been rescheduled for tomorrow morning at 0730. NPO after midnight tonight.

## 2022-05-12 NOTE — PROGRESS NOTES
Pt Name: Snow Millard  Medical Record Number: [id number]  Date of Birth 1985   Today's Date: 5/12/2022      Subjective: Stable overnight, exam without changes, fever up to 100.6 overnight     ROS: Constitutional: fever up to 100.6 ovenight    Vitals:  Vitals:    05/11/22 1624 05/11/22 2223 05/12/22 0143 05/12/22 0557   BP: 130/76 126/78  127/83   Pulse: 76 83  66   Resp: 20 16 16   Temp: 99.1 °F (37.3 °C) 100.6 °F (38.1 °C)  98.5 °F (36.9 °C)   TempSrc: Oral Oral  Oral   SpO2: 97% 95%  98%   Weight:   175 lb 0.7 oz (79.4 kg)    Height:         I/O last 3 completed shifts: In: 1560 [P.O.:1560]  Out: 1200 [Urine:1200]    Exam:  General: Awake, oriented, no acute distress  Respiratory: Nonlabored breathing  Abdomen: Soft, non-tender, non-distended, no masses  : Right testicle swelling, indurated and firm, some erythema noted into inguinal canal. Right spermatic cord unchanged.  No fluctuance  Skin: Skin color, texture, turgor normal, no rashes or lesions  Neurologic: no gross deficits    CURRENT MEDICATIONS   Scheduled Meds:   fluconazole  200 mg IntraVENous Q24H    insulin glargine  0.25 Units/kg SubCUTAneous Nightly    sodium chloride flush  10 mL IntraVENous 2 times per day    enoxaparin  40 mg SubCUTAneous Daily    piperacillin-tazobactam  4,500 mg IntraVENous Q8H    insulin lispro  0-12 Units SubCUTAneous TID     insulin lispro  0-6 Units SubCUTAneous Nightly    linezolid  600 mg IntraVENous Q12H     Continuous Infusions:   sodium chloride 5 mL/hr at 05/10/22 1224    dextrose      sodium chloride 75 mL/hr at 05/10/22 2140     PRN Meds:.ibuprofen, sodium chloride flush, sodium chloride, potassium chloride **OR** potassium alternative oral replacement **OR** potassium chloride, potassium chloride, magnesium sulfate, promethazine **OR** ondansetron, magnesium hydroxide, acetaminophen **OR** acetaminophen, glucose, dextrose, glucagon (rDNA), dextrose, oxyCODONE    LABS     Recent Labs 05/10/22  0658 05/11/22  0732 05/12/22  0717   WBC  --  7.2 7.8   HGB  --  12.4* 12.6*   HCT  --  35.8* 36.9*   PLT  --  270 292    136 137   K 3.8 3.6 3.7    101 103   CO2 23 24 23   BUN 5* 5* 5*   CREATININE <0.5* <0.5* <0.5*   CALCIUM 8.7 8.8 9.2         ASSESSMENT   1. Hospital day # 6  2. Severe right epididymal orchitis. CT with possible small abscess along spermatic cord at the internal ring. not responding to outpatient therapy. Cultures from admission no growth to date. Slight 10 to 20% improvement with systemic antibiotic therapy after 5 days. PLAN   1. There has been slight improvement but no significant changes and induration of the spermatic cord. Right orchiectomy today with Dr. London Reyez. Keep NPO. Will send operative cultures - fungal and AFB per ID recommendations.   2. Covid negative    ISAAK Taveras - CNP 5/12/2022 8:27 AM

## 2022-05-12 NOTE — PROGRESS NOTES
Infectious Disease Follow up Notes  Admit Date: 5/6/2022  Hospital Day: 7    Antibiotics :   IV Zosyn   IV Fluconazole  IV Linezolid       CHIEF COMPLAINT:     Sepsis  Fever  Rt groin cellulitis abscess  Epididymo orchitis  DM poor control     Subjective interval History :  44 y.o. man with DM uncontrolled last HbA1C > 10 nearly 2 yrs ago and not been on meds due to lack of insurance was seen in ED on  3/18/22 for Dysuria and UA very abnormal and urine cx with Klebsiella noted was placed on Bactrim back then and had second visit to ED on 4/11/22 with Rt scrotal pain and swelling USG negative was placed on Doxycycline , was seen in ED hereon   4/13/22 was given IM Cetriaxone and added Flagyl and Cipro referred to Urology - was seen by . Urine ccx on that visit 25k of Klebsiella noted. He is now admitted with worsening pain Rt scrotal swelling and redness spreading up the Rt inguinal canal. He has no pain with urination but has severe scrotal pain. CT abd/pelvis on this admit with fluid in the inguinal canal with on going redness and concern for deep infection. 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 He is not insured not sure if he has taken the antibiotics ? ? Location : Rt groin pain, swelling and redness       Quality : aching, burning        Severity : 10/10    Duration :  4 weeks    Timing : constant   Context :  UTI and DM poor control     Modifying factors : none   Associated signs and symptoms: pain swelling local redness        Interval History : rt groin and scrotal pain on going some improvement  But still with fevers and pain and skin redness some improvement and BG still high .     Past Medical History:    Past Medical History:   Diagnosis Date    Diabetes mellitus ([facility] [address]        Past Surgical History:    History reviewed. No pertinent surgical history. Current Medications:    Outpatient Medications Marked as Taking for the 5/6/22 encounter [facility] Encounter)   Medication Sig Dispense Refill    ciprofloxacin (CIPRO) 250 MG tablet Take 250 mg by mouth 2 times daily For 14 days      ibuprofen (ADVIL;MOTRIN) 600 MG tablet Take 600 mg by mouth every 6 hours as needed for Pain         Allergies:  Patient has no known allergies. Immunizations : There is no immunization history on file for this patient. Social History:    Social History     Tobacco Use    Smoking status: Never Smoker    Smokeless tobacco: Never Used   Substance Use Topics    Alcohol use: No    Drug use: No     Social History     Tobacco Use   Smoking Status Never Smoker   Smokeless Tobacco Never Used      Family History : no DVT no COPD       REVIEW OF SYSTEMS:     Constitutional: fevers + , chillS+  night sweats  Eyes:  negative for blurred vision, eye discharge, visual disturbance   HEENT:  negative for hearing loss, ear drainage,nasal congestion  Respiratory:  negative for cough, shortness of breath or hemoptysis   Cardiovascular:  negative for chest pain, palpitations, syncope  Gastrointestinal:  negative for nausea, vomiting, diarrhea, constipation, abdominal pain  Genitourinary:  negative for frequency, dysuria, urinary incontinence, hematuria SCROTAL PAIN + SWELLING+   Hematologic/Lymphatic:  negative for easy bruising, bleeding and lymphadenopathy  Allergic/Immunologic:  negative for recurrent infections, angioedema, anaphylaxis   Endocrine:  negative for weight changes, polyuria, polydipsia and polyphagia  Musculoskeletal:  negative for joint  pain, swelling, decreased range of motion  Integumentary: No rashes, skin lesions  Neurological:  negative for headaches, slurred speech, unilateral weakness  Psychiatric: negative for hallucinations,confusion,agitation.                 PHYSICAL EXAM:      Vitals:    /86   Pulse 62   Temp 98.5 °F (36.9 °C) (Oral)   Resp 18   Ht 5' 9\" (1.753 m)   Wt 175 lb 0.7 oz (79.4 kg)   SpO2 96%   BMI 25.85 kg/m²     General Appearance: alert,in some  acute distress, no pallor, no icterus   Skin: warm and dry, no rash or erythema  Head: normocephalic and atraumatic  Eyes: pupils equal, round, and reactive to light, conjunctivae normal  ENT: tympanic membrane, external ear and ear canal normal bilaterally, nose without deformity, nasal mucosa and turbinates normal without polyps  Neck: supple and non-tender without mass, no thyromegaly  no cervical lymphadenopathy  Pulmonary/Chest: clear to auscultation bilaterally- no wheezes, rales or rhonchi, normal air movement, no respiratory distress  Cardiovascular: normal rate, regular rhythm, normal S1 and S2, no murmurs, rubs, clicks, or gallops, no carotid bruits  Abdomen: soft, non-tender, non-distended, normal bowel sounds, no masses or organomegaly  Extremities: no cyanosis, clubbing or edema  Musculoskeletal: normal range of motion, no joint swelling, deformity or tenderness  Integumentary: No rashes, no abnormal skin lesions, no petechiae  Neurologic: reflexes normal and symmetric, no cranial nerve deficit  Psych:  Orientation, sensorium, mood normal            Lines:IV  Rt groin inguinal area on going swelling local pain redness some improvement with cellulitis and Rt scrotal swelling and pain no skin changes         Data Review:    CBC:   Lab Results   Component Value Date    WBC 7.8 05/12/2022    HGB 12.6 (L) 05/12/2022    HCT 36.9 (L) 05/12/2022    MCV 89.9 05/12/2022     05/12/2022     RENAL:   Lab Results   Component Value Date    CREATININE <0.5 (L) 05/12/2022    BUN 5 (L) 05/12/2022     05/12/2022    K 3.7 05/12/2022     05/12/2022    CO2 23 05/12/2022     SED RATE:   Lab Results   Component Value Date    SEDRATE 84 05/07/2022     CK: No results found for: CKTOTAL  CRP:   Lab Results   Component Value Date    .4 05/07/2022 Hepatic Function Panel:   Lab Results   Component Value Date    ALKPHOS 182 05/06/2022    ALT 11 05/06/2022    AST 14 05/06/2022    PROT 8.0 05/06/2022    BILITOT 1.2 05/06/2022    LABALBU 3.7 05/06/2022     UA:  Lab Results   Component Value Date    COLORU Yellow 05/06/2022    CLARITYU Clear 05/06/2022    GLUCOSEU >=1000 05/06/2022    BILIRUBINUR Negative 05/06/2022    KETUA TRACE 05/06/2022    SPECGRAV 1.052 05/06/2022    BLOODU Negative 05/06/2022    PHUR 6.5 05/06/2022    PROTEINU Negative 05/06/2022    UROBILINOGEN 1.0 05/06/2022    NITRU Negative 05/06/2022    LEUKOCYTESUR Negative 05/06/2022    LABMICR Not Indicated 05/06/2022    URINETYPE NotGiven 05/06/2022      Urine Microscopic:   Lab Results   Component Value Date    BACTERIA Rare 02/27/2014    HYALCAST 1 04/13/2022    WBCUA 397 04/13/2022    RBCUA 20 04/13/2022    EPIU 2 04/13/2022     Urine Reflex to Culture:   Lab Results   Component Value Date    URRFLXCULT Not Indicated 05/06/2022         MICRO: cultures reviewed and updated by me   Blood Culture:   Lab Results   Component Value Date    BC No Growth after 4 days of incubation. 05/06/2022    BLOODCULT2 No Growth after 4 days of incubation.  05/06/2022     Component 4/13/22 1438   Organism Klebsiella pneumoniae Abnormal     Urine Culture, Routine 25,000 CFU/ml    Resulting Agency [address] [facility]            Susceptibility        Klebsiella pneumoniae (1)     Antibiotic Interpretation Microscan   Method Status     amoxicillin-clavulanate Sensitive <=8/4 mcg/mL BACTERIAL SUSCEPTIBILITY PANEL BY KRISTI       ampicillin Resistant >16 mcg/mL BACTERIAL SUSCEPTIBILITY PANEL BY KRISTI       ampicillin-sulbactam Sensitive <=8/4 mcg/mL BACTERIAL SUSCEPTIBILITY PANEL BY KRISTI       ceFAZolin Sensitive <=2 mcg/mL BACTERIAL SUSCEPTIBILITY PANEL BY KRISTI         NOTE: Cefazolin should only be used for uncomplicated UTI         for E.coli or Klebsiella pneumoniae.           cefepime Sensitive <=2 mcg/mL BACTERIAL SUSCEPTIBILITY PANEL BY KRISTI       cefTRIAXone Sensitive <=1 mcg/mL BACTERIAL SUSCEPTIBILITY PANEL BY KRISTI       cefuroxime Sensitive <=4 mcg/mL BACTERIAL SUSCEPTIBILITY PANEL BY KRISTI       ciprofloxacin Sensitive <=1 mcg/mL BACTERIAL SUSCEPTIBILITY PANEL BY KRISTI       ertapenem Sensitive <=0.5 mcg/mL BACTERIAL SUSCEPTIBILITY PANEL BY KRISTI       gentamicin Sensitive <=4 mcg/mL BACTERIAL SUSCEPTIBILITY PANEL BY KRISTI       meropenem Sensitive <=1 mcg/mL BACTERIAL SUSCEPTIBILITY PANEL BY KRISTI       nitrofurantoin Resistant >64 mcg/mL BACTERIAL SUSCEPTIBILITY PANEL BY KRISTI       piperacillin-tazobactam Sensitive <=16 mcg/mL BACTERIAL SUSCEPTIBILITY PANEL BY KRISTI       trimethoprim-sulfamethoxazole Sensitive <=2/38 mcg/mL BACTERIAL SUSCEPTIBILITY PANEL BY KRISTI             Narrative  Performed by: [facility]  ORDER#: [id number]                          ORDERED BY: Mary Dove   SOURCE: Urine Clean Catch                  COLLECTED:  04/13/22                Procedure Component Value Units Date/Time   Culture, Blood 2 [[id number]] Collected: 05/06/22 1359   Order Status: Sent Specimen: Blood Updated: 05/06/22 1420   Culture, Blood 1 [[id number]] Collected: 05/06/22 1359   Order Status: Sent Specimen: Blood Updated: 05/06/22 1420       Respiratory Culture:  No results found for: Camelia Puri  AFB:No results found for: [facility]  Viral Culture:  Lab Results   Component Value Date    COVID19 Not Detected 05/11/2022     Urine Culture:   No results for input(s): Asenath Peak in the last 72 hours. IMAGING:    CT ABDOMEN PELVIS W IV CONTRAST Additional Contrast? None   Final Result   Worsening right epididymal orchitis as described above with a small fluid   collection in the pelvis as measured above.              CT ABDOMEN PELVIS W IV CONTRAST Additional Contrast? None   Final Result   Worsening right epididymal orchitis as described above with a small fluid   collection in the pelvis as measured above.              USG sCROTUM :  4/13/22     Impression   Findings are most suggestive of right epididymo-orchitis.  Clinical follow-up   to resolution is recommended.       Flow was seen within each testicle, arguing against acute torsion.       3 mm left epididymal cyst or spermatocele.       RECOMMENDATIONS:   Unavailable             All the pertinent images and reports for the current Hospitalization were reviewed by me     Scheduled Meds:   fluconazole  200 mg IntraVENous Q24H    insulin glargine  0.25 Units/kg SubCUTAneous Nightly    sodium chloride flush  10 mL IntraVENous 2 times per day    enoxaparin  40 mg SubCUTAneous Daily    piperacillin-tazobactam  4,500 mg IntraVENous Q8H    insulin lispro  0-12 Units SubCUTAneous TID WC    insulin lispro  0-6 Units SubCUTAneous Nightly    linezolid  600 mg IntraVENous Q12H       Continuous Infusions:   sodium chloride 5 mL/hr at 05/10/22 1224    dextrose      sodium chloride 75 mL/hr at 05/10/22 2140       PRN Meds:  ibuprofen, sodium chloride flush, sodium chloride, potassium chloride **OR** potassium alternative oral replacement **OR** potassium chloride, potassium chloride, magnesium sulfate, promethazine **OR** ondansetron, magnesium hydroxide, acetaminophen **OR** acetaminophen, glucose, dextrose, glucagon (rDNA), dextrose, oxyCODONE      Assessment:     Patient Active Problem List   Diagnosis    Abdominal pain       Sepsis resolving   Fevers on going   WBC normal from partial treatment   Rt inguinal fluid collection concern for abscess on the CT  Rt epididymo orchitis with extension into inguinal canal  UTI recent with Klebsiella pneumoniae partially treated  DM poor control ( NOT TAKING MEDS > 2 YR due to lack of INSURANCE)  Remote HISTORY of Gonorrhea  CT abd/pelvis with worsening of Orchitis compared to before        UNfortunately has not responded to out patient treatment as expected despite appropriate course of abx makes me wonder if he has not Taken his MEDS  Due to Lack of INSURANCE or due to Uncontrolled DM he is having complications     As this is some what unusual if Klebsiella is the Culprit Organism was sensitive to Bactrim, Cipro  Etc       He has made minimal progress on broad spectrum IV abx and most likely needs surgery will d/w Urology about their plans    Urology following closely is a plan for possible surgical intervention we will request operative cultures including fungal and AFB    Check TB QuantiFERON - and He is BCG vaccinated       Labs, Microbiology, Radiology and all the pertinent results from current hospitalization and  care every where were reviewed  by me as a part of the evaluation   Plan:   1. IV Zosyn x 4.5 gm Q 8hrs HIGH dose   2. Cont   IV Linezolid x 600 mg q 12 hrs  3. Cont  IV Fluconazole x 400 mg   4. Control DM    5. HbA1C very elevated    6. bLOOD CX NGDT  7. HIV -ve, Hepatitis, Gonorrhea -ve, Chlamydia -ve, Syphilis screen  -ve  8. ESR. CRP noted   9. Social service assistance for antibiotic help  10. Per  Urology team going to OR soon - ? Tomorrow    11. Please send OR cx for [employer], fUNGAL routine cx d/w RN        Discussed with patient/Family and Nursing   Risk of Complications/Morbidity: High      · Illness(es)/ Infection present that pose threat to bodily function. · There is potential for severe exacerbation of infection/side effects of treatment. · Therapy requires intensive monitoring for antimicrobial agent toxicity        Discussed with patient/Family and Nursing staff     Thanks for allowing me to participate in your patient's care and please call me with any questions or concerns.     Eris Gomez MD  Infectious Disease  [facility]) Physician  Phone: [phone]   Fax : [phone]

## 2022-05-12 NOTE — FLOWSHEET NOTE
Pt returned from preop area. Surgery rescheduled for tomorrow morning. Pt updated, VU. Pt c/o pain \"7\" out out of 10, prn analgesic administered per MD order, see MAR.   Electronically signed by Ming Ornelas RN on 5/12/2022 at 12:23 PM

## 2022-05-12 NOTE — ANESTHESIA PRE PROCEDURE
Department of Anesthesiology  Preprocedure Note       Name:  Verona Pemberton   Age:  44 y.o.  :  1985                                          MRN:  [id number]         Date:  2022      Surgeon: Bill Stubbs):  Renetta Arnold MD    Procedure: Procedure(s):  RIGHT INGUINAL ORCHIECTOMY    Medications prior to admission:   Prior to Admission medications    Medication Sig Start Date End Date Taking?  Authorizing Provider   ciprofloxacin (CIPRO) 250 MG tablet Take 250 mg by mouth 2 times daily For 14 days   Yes Historical Provider, MD   ibuprofen (ADVIL;MOTRIN) 600 MG tablet Take 600 mg by mouth every 6 hours as needed for Pain   Yes Historical Provider, MD       Current medications:    Current Facility-Administered Medications   Medication Dose Route Frequency Provider Last Rate Last Admin    ibuprofen (ADVIL;MOTRIN) tablet 400 mg  400 mg Oral Q6H PRN ISAAK Molina - CNP   400 mg at 22 1859    fluconazole (DIFLUCAN) in 0.9 % sodium chloride IVPB 200 mg  200 mg IntraVENous Q24H Dori Engel  mL/hr at 22 2249 200 mg at 22 2249    insulin glargine (LANTUS) injection vial 20 Units  0.25 Units/kg SubCUTAneous Nightly Shivam Zhang MD   20 Units at 22 2132    sodium chloride flush 0.9 % injection 10 mL  10 mL IntraVENous 2 times per day Bethanie Prajapati MD   10 mL at 22 0924    sodium chloride flush 0.9 % injection 10 mL  10 mL IntraVENous PRN Bethanie Prajapati MD        0.9 % sodium chloride infusion   IntraVENous PRN Bethanie Prajapati MD 5 mL/hr at 05/10/22 1224 New Bag at 05/10/22 1224    potassium chloride (KLOR-CON M) extended release tablet 40 mEq  40 mEq Oral PRN Bethanie Prajapati MD        Or    potassium bicarb-citric acid (EFFER-K) effervescent tablet 40 mEq  40 mEq Oral PRN Bethanie Prajapati MD        Or    potassium chloride 10 mEq/100 mL IVPB (Peripheral Line)  10 mEq IntraVENous PRN Bethanie Prajapati MD        potassium chloride 10 mEq/100 mL IVPB (Peripheral Line)  10 mEq IntraVENous PRN Marychuy Delgado MD        magnesium sulfate 2000 mg in 50 mL IVPB premix  2,000 mg IntraVENous PRN Marychuy Delgado MD        enoxaparin (LOVENOX) injection 40 mg  40 mg SubCUTAneous Daily Marychuy Delgado MD   40 mg at 05/11/22 0758    promethazine (PHENERGAN) tablet 12.5 mg  12.5 mg Oral Q6H PRN Marychuy Delgado MD        Or    ondansetron (ZOFRAN) injection 4 mg  4 mg IntraVENous Q6H PRN Marychuy Delgado MD        magnesium hydroxide (MILK OF MAGNESIA) 400 MG/5ML suspension 30 mL  30 mL Oral Daily PRN Marychuy Delgado MD        acetaminophen (TYLENOL) tablet 650 mg  650 mg Oral Q6H PRN Marychuy Delgado MD   650 mg at 05/11/22 2101    Or    acetaminophen (TYLENOL) suppository 650 mg  650 mg Rectal Q6H PRN Marychuy Delgado MD        piperacillin-tazobactam (ZOSYN) 4,500 mg in dextrose 5 % 100 mL IVPB (mini-bag)  4,500 mg IntraVENous Q8H Hawa Alford MD 25 mL/hr at 05/12/22 0556 4,500 mg at 05/12/22 0556    glucose (GLUTOSE) 40 % oral gel 15 g  15 g Oral PRN Marychuy Delgado MD        dextrose 50 % IV solution  12.5 g IntraVENous PRN Marychuy Delgado MD        glucagon (rDNA) injection 1 mg  1 mg IntraMUSCular PRN Marychuy Delgado MD        dextrose 5 % solution  100 mL/hr IntraVENous PRN Marychuy Delgado MD        insulin lispro (HUMALOG) injection vial 0-12 Units  0-12 Units SubCUTAneous TID WC Marychuy Delgado MD   2 Units at 05/11/22 1856    insulin lispro (HUMALOG) injection vial 0-6 Units  0-6 Units SubCUTAneous Nightly Marychuy Delgado MD   1 Units at 05/11/22 2132    linezolid (ZYVOX) IVPB 600 mg  600 mg IntraVENous Q12H Hawa Alford  mL/hr at 05/12/22 0835 600 mg at 05/12/22 0835    0.9 % sodium chloride infusion   IntraVENous Continuous Marychuy Delgado MD 75 mL/hr at 05/10/22 2140 New Bag at 05/10/22 2140    oxyCODONE (ROXICODONE) immediate release tablet 5 mg  5 mg Oral Q6H PRN ISAAK Barr CNP   5 mg at 05/12/22 5187       Allergies:  No Known Allergies    Problem List:    Patient Active Problem List   Diagnosis Code    Abdominal pain R10.9       Past Medical History:        Diagnosis Date    Diabetes mellitus ([facility] [address]        Past Surgical History:  History reviewed. No pertinent surgical history. Social History:    Social History     Tobacco Use    Smoking status: Never Smoker    Smokeless tobacco: Never Used   Substance Use Topics    Alcohol use: No                                Counseling given: Not Answered      Vital Signs (Current):   Vitals:    05/11/22 2223 05/12/22 0143 05/12/22 0557 05/12/22 0840   BP: 126/78  127/83 130/86   Pulse: 83  66 62   Resp: 16  16 18   Temp: 100.6 °F (38.1 °C)  98.5 °F (36.9 °C) 98.5 °F (36.9 °C)   TempSrc: Oral  Oral Oral   SpO2: 95%  98% 96%   Weight:  175 lb 0.7 oz (79.4 kg)     Height:                                                  BP Readings from Last 3 Encounters:   05/12/22 130/86   04/13/22 130/77       NPO Status:                                                                                 BMI:   Wt Readings from Last 3 Encounters:   05/12/22 175 lb 0.7 oz (79.4 kg)   04/13/22 184 lb 11.9 oz (83.8 kg)     Body mass index is 25.85 kg/m². CBC:   Lab Results   Component Value Date    WBC 7.8 05/12/2022    RBC 4.10 05/12/2022    HGB 12.6 05/12/2022    HCT 36.9 05/12/2022    MCV 89.9 05/12/2022    RDW 12.3 05/12/2022     05/12/2022       CMP:   Lab Results   Component Value Date     05/12/2022    K 3.7 05/12/2022     05/12/2022    CO2 23 05/12/2022    BUN 5 05/12/2022    CREATININE <0.5 05/12/2022    GFRAA >60 05/12/2022    AGRATIO 0.9 05/06/2022    LABGLOM >60 05/12/2022    GLUCOSE 198 05/12/2022    PROT 8.0 05/06/2022    CALCIUM 9.2 05/12/2022    BILITOT 1.2 05/06/2022    ALKPHOS 182 05/06/2022    AST 14 05/06/2022    ALT 11 05/06/2022       POC Tests:   Recent Labs     05/12/22  0742   POCGLU 167*       Coags: No results found for: PROTIME, INR, APTT    HCG (If Applicable):  No results found for: PREGTESTUR, PREGSERUM, HCG, HCGQUANT     ABGs: No results found for: PHART, PO2ART, UPD8HZF, LXH0ZXW, BEART, U0LSQDOS     Type & Screen (If Applicable):  No results found for: LABABO, LABRH    Drug/Infectious Status (If Applicable):  No results found for: HIV, HEPCAB    COVID-19 Screening (If Applicable):   Lab Results   Component Value Date    COVID19 Not Detected 05/11/2022           Anesthesia Evaluation  Patient summary reviewed and Nursing notes reviewed no history of anesthetic complications:   Airway: Mallampati: II  TM distance: >3 FB   Neck ROM: full  Mouth opening: > = 3 FB Dental:      Comment: No loose teeth    Pulmonary:Negative Pulmonary ROS                              Cardiovascular:Negative CV ROS            Rhythm: regular                      Neuro/Psych:   Negative Neuro/Psych ROS              GI/Hepatic/Renal:        (-) hiatal hernia, GERD, PUD, hepatitis, liver disease, no renal disease, bowel prep and no morbid obesity       Endo/Other:    (+) Diabetes (A1c 12.0 May 2022)poorly controlled, , no malignancy/cancer. (-) hypothyroidism, hyperthyroidism, blood dyscrasia, arthritis, no electrolyte abnormalities, no malignancy/cancer                ROS comment: Sepsis, complicated prolonged urologic infection. ID on board. Abdominal:             Vascular: negative vascular ROS. Other Findings:           Anesthesia Plan      general     ASA 3       Induction: intravenous. MIPS: Postoperative opioids intended and Prophylactic antiemetics administered. Anesthetic plan and risks discussed with patient. Plan discussed with CRNA. Anatoly Worthy MD   5/12/2022        This pre-anesthesia assessment may be used as a history and physical.    DOS STAFF ADDENDUM:    Pt seen and examined, chart reviewed (including anesthesia, drug and allergy history). No interval changes to history and physical examination.   Anesthetic plan, risks, benefits, alternatives, and personnel involved discussed with patient. Patient verbalized an understanding and agrees to proceed.       Sadie Dwyer MD  May 12, 2022  11:17 AM

## 2022-05-12 NOTE — PLAN OF CARE
Patient updated that surgery will be postponed until tomorrow at 0730. Lynn Morales, floor RN. Patient to be transferred back to [facility].

## 2022-05-13 ENCOUNTER — ANESTHESIA (OUTPATIENT)
Dept: OPERATING ROOM | Age: 37
DRG: 710 | End: 2022-05-13
Payer: MEDICAID

## 2022-05-13 ENCOUNTER — ANESTHESIA EVENT (OUTPATIENT)
Dept: OPERATING ROOM | Age: 37
DRG: 710 | End: 2022-05-13
Payer: MEDICAID

## 2022-05-13 VITALS
TEMPERATURE: 96.6 F | RESPIRATION RATE: 17 BRPM | DIASTOLIC BLOOD PRESSURE: 66 MMHG | OXYGEN SATURATION: 97 % | SYSTOLIC BLOOD PRESSURE: 107 MMHG

## 2022-05-13 LAB
ANION GAP SERPL CALCULATED.3IONS-SCNC: 15 MMOL/L (ref 3–16)
BASOPHILS ABSOLUTE: 0 K/UL (ref 0–0.2)
BASOPHILS RELATIVE PERCENT: 0.6 %
BUN BLDV-MCNC: 7 MG/DL (ref 7–20)
CALCIUM SERPL-MCNC: 9.3 MG/DL (ref 8.3–10.6)
CHLORIDE BLD-SCNC: 101 MMOL/L (ref 99–110)
CO2: 22 MMOL/L (ref 21–32)
CREAT SERPL-MCNC: 0.5 MG/DL (ref 0.9–1.3)
EOSINOPHILS ABSOLUTE: 0.3 K/UL (ref 0–0.6)
EOSINOPHILS RELATIVE PERCENT: 3.2 %
GFR AFRICAN AMERICAN: >60
GFR NON-AFRICAN AMERICAN: >60
GLUCOSE BLD-MCNC: 159 MG/DL (ref 70–99)
GLUCOSE BLD-MCNC: 174 MG/DL (ref 70–99)
GLUCOSE BLD-MCNC: 190 MG/DL (ref 70–99)
GLUCOSE BLD-MCNC: 260 MG/DL (ref 70–99)
GLUCOSE BLD-MCNC: 292 MG/DL (ref 70–99)
GLUCOSE BLD-MCNC: 389 MG/DL (ref 70–99)
HCT VFR BLD CALC: 38.4 % (ref 40.5–52.5)
HEMOGLOBIN: 12.8 G/DL (ref 13.5–17.5)
LYMPHOCYTES ABSOLUTE: 1.7 K/UL (ref 1–5.1)
LYMPHOCYTES RELATIVE PERCENT: 19.9 %
MCH RBC QN AUTO: 30.2 PG (ref 26–34)
MCHC RBC AUTO-ENTMCNC: 33.3 G/DL (ref 31–36)
MCV RBC AUTO: 90.6 FL (ref 80–100)
MONOCYTES ABSOLUTE: 0.9 K/UL (ref 0–1.3)
MONOCYTES RELATIVE PERCENT: 10.4 %
NEUTROPHILS ABSOLUTE: 5.5 K/UL (ref 1.7–7.7)
NEUTROPHILS RELATIVE PERCENT: 65.9 %
PDW BLD-RTO: 12.1 % (ref 12.4–15.4)
PERFORMED ON: ABNORMAL
PLATELET # BLD: 305 K/UL (ref 135–450)
PMV BLD AUTO: 7 FL (ref 5–10.5)
POTASSIUM REFLEX MAGNESIUM: 3.8 MMOL/L (ref 3.5–5.1)
RBC # BLD: 4.24 M/UL (ref 4.2–5.9)
SODIUM BLD-SCNC: 138 MMOL/L (ref 136–145)
WBC # BLD: 8.3 K/UL (ref 4–11)

## 2022-05-13 PROCEDURE — 6370000000 HC RX 637 (ALT 250 FOR IP): Performed by: UROLOGY

## 2022-05-13 PROCEDURE — 87186 SC STD MICRODIL/AGAR DIL: CPT

## 2022-05-13 PROCEDURE — 6360000002 HC RX W HCPCS: Performed by: NURSE ANESTHETIST, CERTIFIED REGISTERED

## 2022-05-13 PROCEDURE — 6360000002 HC RX W HCPCS: Performed by: ANESTHESIOLOGY

## 2022-05-13 PROCEDURE — 2580000003 HC RX 258: Performed by: NURSE ANESTHETIST, CERTIFIED REGISTERED

## 2022-05-13 PROCEDURE — 6360000002 HC RX W HCPCS: Performed by: UROLOGY

## 2022-05-13 PROCEDURE — A4217 STERILE WATER/SALINE, 500 ML: HCPCS | Performed by: UROLOGY

## 2022-05-13 PROCEDURE — 87206 SMEAR FLUORESCENT/ACID STAI: CPT

## 2022-05-13 PROCEDURE — 6370000000 HC RX 637 (ALT 250 FOR IP): Performed by: NURSE ANESTHETIST, CERTIFIED REGISTERED

## 2022-05-13 PROCEDURE — 3700000001 HC ADD 15 MINUTES (ANESTHESIA): Performed by: UROLOGY

## 2022-05-13 PROCEDURE — 87176 TISSUE HOMOGENIZATION CULTR: CPT

## 2022-05-13 PROCEDURE — 85025 COMPLETE CBC W/AUTO DIFF WBC: CPT

## 2022-05-13 PROCEDURE — 6370000000 HC RX 637 (ALT 250 FOR IP): Performed by: NURSE PRACTITIONER

## 2022-05-13 PROCEDURE — 87106 FUNGI IDENTIFICATION YEAST: CPT

## 2022-05-13 PROCEDURE — 80048 BASIC METABOLIC PNL TOTAL CA: CPT

## 2022-05-13 PROCEDURE — 3600000002 HC SURGERY LEVEL 2 BASE: Performed by: UROLOGY

## 2022-05-13 PROCEDURE — 88305 TISSUE EXAM BY PATHOLOGIST: CPT

## 2022-05-13 PROCEDURE — 1200000000 HC SEMI PRIVATE

## 2022-05-13 PROCEDURE — 2580000003 HC RX 258: Performed by: UROLOGY

## 2022-05-13 PROCEDURE — 87205 SMEAR GRAM STAIN: CPT

## 2022-05-13 PROCEDURE — 7100000001 HC PACU RECOVERY - ADDTL 15 MIN: Performed by: UROLOGY

## 2022-05-13 PROCEDURE — 87116 MYCOBACTERIA CULTURE: CPT

## 2022-05-13 PROCEDURE — 87077 CULTURE AEROBIC IDENTIFY: CPT

## 2022-05-13 PROCEDURE — 3600000012 HC SURGERY LEVEL 2 ADDTL 15MIN: Performed by: UROLOGY

## 2022-05-13 PROCEDURE — 3700000000 HC ANESTHESIA ATTENDED CARE: Performed by: UROLOGY

## 2022-05-13 PROCEDURE — 2500000003 HC RX 250 WO HCPCS

## 2022-05-13 PROCEDURE — 87070 CULTURE OTHR SPECIMN AEROBIC: CPT

## 2022-05-13 PROCEDURE — 87075 CULTR BACTERIA EXCEPT BLOOD: CPT

## 2022-05-13 PROCEDURE — 2500000003 HC RX 250 WO HCPCS: Performed by: NURSE ANESTHETIST, CERTIFIED REGISTERED

## 2022-05-13 PROCEDURE — 99233 SBSQ HOSP IP/OBS HIGH 50: CPT | Performed by: INTERNAL MEDICINE

## 2022-05-13 PROCEDURE — 87102 FUNGUS ISOLATION CULTURE: CPT

## 2022-05-13 PROCEDURE — 2709999900 HC NON-CHARGEABLE SUPPLY: Performed by: UROLOGY

## 2022-05-13 PROCEDURE — 6360000002 HC RX W HCPCS: Performed by: INTERNAL MEDICINE

## 2022-05-13 PROCEDURE — 7100000000 HC PACU RECOVERY - FIRST 15 MIN: Performed by: UROLOGY

## 2022-05-13 PROCEDURE — 36415 COLL VENOUS BLD VENIPUNCTURE: CPT

## 2022-05-13 PROCEDURE — 87015 SPECIMEN INFECT AGNT CONCNTJ: CPT

## 2022-05-13 PROCEDURE — 2580000003 HC RX 258: Performed by: INTERNAL MEDICINE

## 2022-05-13 RX ORDER — ONDANSETRON 2 MG/ML
4 INJECTION INTRAMUSCULAR; INTRAVENOUS
Status: DISCONTINUED | OUTPATIENT
Start: 2022-05-13 | End: 2022-05-13 | Stop reason: HOSPADM

## 2022-05-13 RX ORDER — LIDOCAINE HYDROCHLORIDE 20 MG/ML
INJECTION, SOLUTION EPIDURAL; INFILTRATION; INTRACAUDAL; PERINEURAL PRN
Status: DISCONTINUED | OUTPATIENT
Start: 2022-05-13 | End: 2022-05-13 | Stop reason: SDUPTHER

## 2022-05-13 RX ORDER — MIDAZOLAM HYDROCHLORIDE 1 MG/ML
INJECTION INTRAMUSCULAR; INTRAVENOUS PRN
Status: DISCONTINUED | OUTPATIENT
Start: 2022-05-13 | End: 2022-05-13 | Stop reason: SDUPTHER

## 2022-05-13 RX ORDER — FENTANYL CITRATE 50 UG/ML
50 INJECTION, SOLUTION INTRAMUSCULAR; INTRAVENOUS EVERY 5 MIN PRN
Status: DISCONTINUED | OUTPATIENT
Start: 2022-05-13 | End: 2022-05-13 | Stop reason: HOSPADM

## 2022-05-13 RX ORDER — SODIUM CHLORIDE 0.9 % (FLUSH) 0.9 %
5-40 SYRINGE (ML) INJECTION EVERY 12 HOURS SCHEDULED
Status: DISCONTINUED | OUTPATIENT
Start: 2022-05-13 | End: 2022-05-13 | Stop reason: HOSPADM

## 2022-05-13 RX ORDER — PROPOFOL 10 MG/ML
INJECTION, EMULSION INTRAVENOUS PRN
Status: DISCONTINUED | OUTPATIENT
Start: 2022-05-13 | End: 2022-05-13 | Stop reason: SDUPTHER

## 2022-05-13 RX ORDER — DEXAMETHASONE SODIUM PHOSPHATE 4 MG/ML
INJECTION, SOLUTION INTRA-ARTICULAR; INTRALESIONAL; INTRAMUSCULAR; INTRAVENOUS; SOFT TISSUE PRN
Status: DISCONTINUED | OUTPATIENT
Start: 2022-05-13 | End: 2022-05-13 | Stop reason: SDUPTHER

## 2022-05-13 RX ORDER — SUCCINYLCHOLINE/SOD CL,ISO/PF 200MG/10ML
SYRINGE (ML) INTRAVENOUS PRN
Status: DISCONTINUED | OUTPATIENT
Start: 2022-05-13 | End: 2022-05-13 | Stop reason: SDUPTHER

## 2022-05-13 RX ORDER — ROCURONIUM BROMIDE 10 MG/ML
INJECTION, SOLUTION INTRAVENOUS PRN
Status: DISCONTINUED | OUTPATIENT
Start: 2022-05-13 | End: 2022-05-13 | Stop reason: SDUPTHER

## 2022-05-13 RX ORDER — OXYCODONE HYDROCHLORIDE 10 MG/1
10 TABLET ORAL PRN
Status: DISCONTINUED | OUTPATIENT
Start: 2022-05-13 | End: 2022-05-13 | Stop reason: HOSPADM

## 2022-05-13 RX ORDER — SODIUM CHLORIDE 0.9 % (FLUSH) 0.9 %
5-40 SYRINGE (ML) INJECTION PRN
Status: DISCONTINUED | OUTPATIENT
Start: 2022-05-13 | End: 2022-05-13 | Stop reason: HOSPADM

## 2022-05-13 RX ORDER — FENTANYL CITRATE 50 UG/ML
INJECTION, SOLUTION INTRAMUSCULAR; INTRAVENOUS PRN
Status: DISCONTINUED | OUTPATIENT
Start: 2022-05-13 | End: 2022-05-13 | Stop reason: SDUPTHER

## 2022-05-13 RX ORDER — MORPHINE SULFATE 4 MG/ML
4 INJECTION, SOLUTION INTRAMUSCULAR; INTRAVENOUS
Status: DISCONTINUED | OUTPATIENT
Start: 2022-05-13 | End: 2022-05-18 | Stop reason: HOSPADM

## 2022-05-13 RX ORDER — SODIUM CHLORIDE 9 MG/ML
INJECTION, SOLUTION INTRAVENOUS PRN
Status: DISCONTINUED | OUTPATIENT
Start: 2022-05-13 | End: 2022-05-18 | Stop reason: HOSPADM

## 2022-05-13 RX ORDER — OXYCODONE HYDROCHLORIDE 5 MG/1
5 TABLET ORAL PRN
Status: DISCONTINUED | OUTPATIENT
Start: 2022-05-13 | End: 2022-05-13 | Stop reason: HOSPADM

## 2022-05-13 RX ORDER — MINERAL OIL AND WHITE PETROLATUM 150; 830 MG/G; MG/G
OINTMENT OPHTHALMIC PRN
Status: DISCONTINUED | OUTPATIENT
Start: 2022-05-13 | End: 2022-05-13 | Stop reason: SDUPTHER

## 2022-05-13 RX ORDER — SODIUM CHLORIDE 9 MG/ML
INJECTION, SOLUTION INTRAVENOUS CONTINUOUS PRN
Status: DISCONTINUED | OUTPATIENT
Start: 2022-05-13 | End: 2022-05-13 | Stop reason: SDUPTHER

## 2022-05-13 RX ORDER — FENTANYL CITRATE 50 UG/ML
25 INJECTION, SOLUTION INTRAMUSCULAR; INTRAVENOUS EVERY 5 MIN PRN
Status: DISCONTINUED | OUTPATIENT
Start: 2022-05-13 | End: 2022-05-13 | Stop reason: HOSPADM

## 2022-05-13 RX ORDER — ONDANSETRON 2 MG/ML
INJECTION INTRAMUSCULAR; INTRAVENOUS PRN
Status: DISCONTINUED | OUTPATIENT
Start: 2022-05-13 | End: 2022-05-13 | Stop reason: SDUPTHER

## 2022-05-13 RX ADMIN — FENTANYL CITRATE 50 MCG: 50 INJECTION INTRAMUSCULAR; INTRAVENOUS at 08:00

## 2022-05-13 RX ADMIN — INSULIN GLARGINE 20 UNITS: 100 INJECTION, SOLUTION SUBCUTANEOUS at 20:10

## 2022-05-13 RX ADMIN — LIDOCAINE HYDROCHLORIDE 80 MG: 20 INJECTION, SOLUTION EPIDURAL; INFILTRATION; INTRACAUDAL; PERINEURAL at 07:32

## 2022-05-13 RX ADMIN — MORPHINE SULFATE 4 MG: 4 INJECTION, SOLUTION INTRAMUSCULAR; INTRAVENOUS at 22:13

## 2022-05-13 RX ADMIN — PIPERACILLIN AND TAZOBACTAM 4500 MG: 4; .5 INJECTION, POWDER, LYOPHILIZED, FOR SOLUTION INTRAVENOUS at 20:08

## 2022-05-13 RX ADMIN — PROPOFOL 150 MG: 10 INJECTION, EMULSION INTRAVENOUS at 07:32

## 2022-05-13 RX ADMIN — IBUPROFEN 400 MG: 400 TABLET ORAL at 20:00

## 2022-05-13 RX ADMIN — FENTANYL CITRATE 50 MCG: 50 INJECTION, SOLUTION INTRAMUSCULAR; INTRAVENOUS at 09:24

## 2022-05-13 RX ADMIN — INSULIN LISPRO 6 UNITS: 100 INJECTION, SOLUTION INTRAVENOUS; SUBCUTANEOUS at 18:08

## 2022-05-13 RX ADMIN — IBUPROFEN 400 MG: 400 TABLET ORAL at 05:10

## 2022-05-13 RX ADMIN — MORPHINE SULFATE 4 MG: 4 INJECTION, SOLUTION INTRAMUSCULAR; INTRAVENOUS at 10:39

## 2022-05-13 RX ADMIN — MIDAZOLAM 2 MG: 1 INJECTION INTRAMUSCULAR; INTRAVENOUS at 07:24

## 2022-05-13 RX ADMIN — MORPHINE SULFATE 4 MG: 4 INJECTION, SOLUTION INTRAMUSCULAR; INTRAVENOUS at 15:27

## 2022-05-13 RX ADMIN — SODIUM CHLORIDE: 9 INJECTION, SOLUTION INTRAVENOUS at 20:14

## 2022-05-13 RX ADMIN — DEXAMETHASONE SODIUM PHOSPHATE 4 MG: 4 INJECTION, SOLUTION INTRAMUSCULAR; INTRAVENOUS at 07:52

## 2022-05-13 RX ADMIN — SODIUM CHLORIDE: 9 INJECTION, SOLUTION INTRAVENOUS at 07:00

## 2022-05-13 RX ADMIN — FENTANYL CITRATE 50 MCG: 50 INJECTION INTRAMUSCULAR; INTRAVENOUS at 08:54

## 2022-05-13 RX ADMIN — SUGAMMADEX 150 MG: 100 INJECTION, SOLUTION INTRAVENOUS at 08:44

## 2022-05-13 RX ADMIN — INSULIN LISPRO 5 UNITS: 100 INJECTION, SOLUTION INTRAVENOUS; SUBCUTANEOUS at 21:07

## 2022-05-13 RX ADMIN — MORPHINE SULFATE 4 MG: 4 INJECTION, SOLUTION INTRAMUSCULAR; INTRAVENOUS at 20:00

## 2022-05-13 RX ADMIN — LINEZOLID 600 MG: 600 INJECTION, SOLUTION INTRAVENOUS at 20:15

## 2022-05-13 RX ADMIN — PIPERACILLIN AND TAZOBACTAM 4500 MG: 4; .5 INJECTION, POWDER, LYOPHILIZED, FOR SOLUTION INTRAVENOUS at 05:12

## 2022-05-13 RX ADMIN — FENTANYL CITRATE 50 MCG: 50 INJECTION, SOLUTION INTRAMUSCULAR; INTRAVENOUS at 09:39

## 2022-05-13 RX ADMIN — HYDROMORPHONE HYDROCHLORIDE 0.5 MG: 1 INJECTION, SOLUTION INTRAMUSCULAR; INTRAVENOUS; SUBCUTANEOUS at 07:52

## 2022-05-13 RX ADMIN — OXYCODONE 5 MG: 5 TABLET ORAL at 05:09

## 2022-05-13 RX ADMIN — WHITE PETROLATUM 57.7 %-MINERAL OIL 31.9 % EYE OINTMENT 0.5 INCH: at 08:44

## 2022-05-13 RX ADMIN — ROCURONIUM BROMIDE 25 MG: 10 INJECTION INTRAVENOUS at 07:45

## 2022-05-13 RX ADMIN — FENTANYL CITRATE 100 MCG: 50 INJECTION INTRAMUSCULAR; INTRAVENOUS at 07:29

## 2022-05-13 RX ADMIN — LINEZOLID 600 MG: 600 INJECTION, SOLUTION INTRAVENOUS at 10:43

## 2022-05-13 RX ADMIN — HYDROMORPHONE HYDROCHLORIDE 0.5 MG: 1 INJECTION, SOLUTION INTRAMUSCULAR; INTRAVENOUS; SUBCUTANEOUS at 07:49

## 2022-05-13 RX ADMIN — ONDANSETRON 4 MG: 2 INJECTION INTRAMUSCULAR; INTRAVENOUS at 08:30

## 2022-05-13 RX ADMIN — ROCURONIUM BROMIDE 5 MG: 10 INJECTION INTRAVENOUS at 07:32

## 2022-05-13 RX ADMIN — Medication 120 MG: at 07:32

## 2022-05-13 RX ADMIN — MORPHINE SULFATE 4 MG: 4 INJECTION, SOLUTION INTRAMUSCULAR; INTRAVENOUS at 18:48

## 2022-05-13 RX ADMIN — MORPHINE SULFATE 4 MG: 4 INJECTION, SOLUTION INTRAMUSCULAR; INTRAVENOUS at 11:41

## 2022-05-13 RX ADMIN — PIPERACILLIN AND TAZOBACTAM 4500 MG: 4; .5 INJECTION, POWDER, LYOPHILIZED, FOR SOLUTION INTRAVENOUS at 15:26

## 2022-05-13 ASSESSMENT — PULMONARY FUNCTION TESTS
PIF_VALUE: 28
PIF_VALUE: 17
PIF_VALUE: 14
PIF_VALUE: 15
PIF_VALUE: 12
PIF_VALUE: 15
PIF_VALUE: 14
PIF_VALUE: 14
PIF_VALUE: 15
PIF_VALUE: 16
PIF_VALUE: 17
PIF_VALUE: 5
PIF_VALUE: 17
PIF_VALUE: 15
PIF_VALUE: 15
PIF_VALUE: 13
PIF_VALUE: 0
PIF_VALUE: 15
PIF_VALUE: 17
PIF_VALUE: 15
PIF_VALUE: 3
PIF_VALUE: 17
PIF_VALUE: 14
PIF_VALUE: 1
PIF_VALUE: 17
PIF_VALUE: 17
PIF_VALUE: 9
PIF_VALUE: 15
PIF_VALUE: 14
PIF_VALUE: 17
PIF_VALUE: 15
PIF_VALUE: 17
PIF_VALUE: 21
PIF_VALUE: 15
PIF_VALUE: 13
PIF_VALUE: 17
PIF_VALUE: 1
PIF_VALUE: 2
PIF_VALUE: 15
PIF_VALUE: 14
PIF_VALUE: 15
PIF_VALUE: 17
PIF_VALUE: 15
PIF_VALUE: 17
PIF_VALUE: 15
PIF_VALUE: 17
PIF_VALUE: 25
PIF_VALUE: 15
PIF_VALUE: 18
PIF_VALUE: 15
PIF_VALUE: 1
PIF_VALUE: 15
PIF_VALUE: 14
PIF_VALUE: 13
PIF_VALUE: 10
PIF_VALUE: 14
PIF_VALUE: 14
PIF_VALUE: 17
PIF_VALUE: 14
PIF_VALUE: 15
PIF_VALUE: 17
PIF_VALUE: 15
PIF_VALUE: 2
PIF_VALUE: 15
PIF_VALUE: 14
PIF_VALUE: 15
PIF_VALUE: 1
PIF_VALUE: 17

## 2022-05-13 ASSESSMENT — PAIN DESCRIPTION - LOCATION
LOCATION: SCROTUM;GROIN
LOCATION: GROIN;SCROTUM
LOCATION: SCROTUM;GROIN
LOCATION: SCROTUM
LOCATION: SCROTUM;GROIN
LOCATION: GROIN
LOCATION: GROIN
LOCATION: SCROTUM

## 2022-05-13 ASSESSMENT — PAIN - FUNCTIONAL ASSESSMENT
PAIN_FUNCTIONAL_ASSESSMENT: 0-10
PAIN_FUNCTIONAL_ASSESSMENT: PREVENTS OR INTERFERES SOME ACTIVE ACTIVITIES AND ADLS
PAIN_FUNCTIONAL_ASSESSMENT: ACTIVITIES ARE NOT PREVENTED
PAIN_FUNCTIONAL_ASSESSMENT: PREVENTS OR INTERFERES SOME ACTIVE ACTIVITIES AND ADLS
PAIN_FUNCTIONAL_ASSESSMENT: ACTIVITIES ARE NOT PREVENTED

## 2022-05-13 ASSESSMENT — PAIN DESCRIPTION - DESCRIPTORS
DESCRIPTORS: SORE;BURNING
DESCRIPTORS: THROBBING
DESCRIPTORS: SHOOTING;SHARP
DESCRIPTORS: BURNING;SORE
DESCRIPTORS: SHARP;SHOOTING;SORE
DESCRIPTORS: SHARP
DESCRIPTORS: SORE;SHOOTING

## 2022-05-13 ASSESSMENT — PAIN SCALES - GENERAL
PAINLEVEL_OUTOF10: 6
PAINLEVEL_OUTOF10: 7
PAINLEVEL_OUTOF10: 0
PAINLEVEL_OUTOF10: 9
PAINLEVEL_OUTOF10: 7
PAINLEVEL_OUTOF10: 3
PAINLEVEL_OUTOF10: 7
PAINLEVEL_OUTOF10: 3
PAINLEVEL_OUTOF10: 9
PAINLEVEL_OUTOF10: 6
PAINLEVEL_OUTOF10: 6
PAINLEVEL_OUTOF10: 7
PAINLEVEL_OUTOF10: 7
PAINLEVEL_OUTOF10: 8
PAINLEVEL_OUTOF10: 7

## 2022-05-13 ASSESSMENT — PAIN DESCRIPTION - FREQUENCY
FREQUENCY: CONTINUOUS
FREQUENCY: CONTINUOUS

## 2022-05-13 ASSESSMENT — PAIN DESCRIPTION - PAIN TYPE
TYPE: SURGICAL PAIN
TYPE: ACUTE PAIN;SURGICAL PAIN

## 2022-05-13 ASSESSMENT — PAIN DESCRIPTION - ORIENTATION
ORIENTATION: RIGHT

## 2022-05-13 ASSESSMENT — PAIN DESCRIPTION - ONSET
ONSET: ON-GOING
ONSET: ON-GOING

## 2022-05-13 ASSESSMENT — PAIN SCALES - WONG BAKER
WONGBAKER_NUMERICALRESPONSE: 6
WONGBAKER_NUMERICALRESPONSE: 0

## 2022-05-13 NOTE — ANESTHESIA PRE PROCEDURE
Department of Anesthesiology  Preprocedure Note       Name:  Adam Wright   Age:  44 y.o.  :  1985                                          MRN:  [id number]         Date:  2022      Surgeon: Risa Montaño):  Mattie Taveras MD    Procedure: Procedure(s):  RIGHT INGUINAL ORCHIECTOMY    Medications prior to admission:   Prior to Admission medications    Medication Sig Start Date End Date Taking? Authorizing Provider   ciprofloxacin (CIPRO) 250 MG tablet Take 250 mg by mouth 2 times daily For 14 days    Historical Provider, MD   ibuprofen (ADVIL;MOTRIN) 600 MG tablet Take 600 mg by mouth every 6 hours as needed for Pain    Historical Provider, MD       Current medications:    No current facility-administered medications for this visit. No current outpatient medications on file.      Facility-Administered Medications Ordered in Other Visits   Medication Dose Route Frequency Provider Last Rate Last Admin    ibuprofen (ADVIL;MOTRIN) tablet 400 mg  400 mg Oral Q6H PRN ISAAK Robledo - CNP   400 mg at 22 0510    insulin glargine (LANTUS) injection vial 20 Units  0.25 Units/kg SubCUTAneous Nightly Steven Vasquez MD   20 Units at 22 2058    sodium chloride flush 0.9 % injection 10 mL  10 mL IntraVENous 2 times per day Steven Vasquez MD   10 mL at 22 0924    sodium chloride flush 0.9 % injection 10 mL  10 mL IntraVENous PRN Steven Vasquez MD        0.9 % sodium chloride infusion   IntraVENous PRN Steven Vasquez MD   Stopped at 22 0512    potassium chloride (KLOR-CON M) extended release tablet 40 mEq  40 mEq Oral PRN Steven Vasquez MD        Or    potassium bicarb-citric acid (EFFER-K) effervescent tablet 40 mEq  40 mEq Oral PRN Steven Vasquez MD        Or    potassium chloride 10 mEq/100 mL IVPB (Peripheral Line)  10 mEq IntraVENous PRN Steven Vasquez MD        potassium chloride 10 mEq/100 mL IVPB (Peripheral Line)  10 mEq IntraVENous PRN Steven Vasquez MD       [facility] magnesium sulfate 2000 mg in 50 mL IVPB premix  2,000 mg IntraVENous PRN Lynda Nicole MD        enoxaparin (LOVENOX) injection 40 mg  40 mg SubCUTAneous Daily Lynda Nicole MD   40 mg at 05/11/22 0758    promethazine (PHENERGAN) tablet 12.5 mg  12.5 mg Oral Q6H PRN Lynda Nicole MD        Or    ondansetron (ZOFRAN) injection 4 mg  4 mg IntraVENous Q6H PRN Lynda Nicole MD        magnesium hydroxide (MILK OF MAGNESIA) 400 MG/5ML suspension 30 mL  30 mL Oral Daily PRN Lynda Nicole MD        acetaminophen (TYLENOL) tablet 650 mg  650 mg Oral Q6H PRN Lynda Nicole MD   650 mg at 05/11/22 2101    Or    acetaminophen (TYLENOL) suppository 650 mg  650 mg Rectal Q6H PRN Lynda Nicole MD        piperacillin-tazobactam (ZOSYN) 4,500 mg in dextrose 5 % 100 mL IVPB (mini-bag)  4,500 mg IntraVENous Q8H Celina Estrada MD 25 mL/hr at 05/13/22 0640 Rate Verify at 05/13/22 0640    glucose (GLUTOSE) 40 % oral gel 15 g  15 g Oral PRN Lynda Nicole MD        dextrose 50 % IV solution  12.5 g IntraVENous PRN Lynda Nicole MD        glucagon (rDNA) injection 1 mg  1 mg IntraMUSCular PRN Lynda Nicole MD        dextrose 5 % solution  100 mL/hr IntraVENous PRN Lynda Nicole MD        insulin lispro (HUMALOG) injection vial 0-12 Units  0-12 Units SubCUTAneous TID WC Lynda Nicole MD   4 Units at 05/12/22 1728    insulin lispro (HUMALOG) injection vial 0-6 Units  0-6 Units SubCUTAneous Nightly Lynda Nicole MD   2 Units at 05/12/22 2058    linezolid (ZYVOX) IVPB 600 mg  600 mg IntraVENous Q12H Celina Estrada MD   Stopped at 05/12/22 2156    0.9 % sodium chloride infusion   IntraVENous Continuous Lynda Nicole MD   Paused at 05/13/22 0631    oxyCODONE (Sharlee Harada) immediate release tablet 5 mg  5 mg Oral Q6H PRN Darcy S Puthoff, APRN - CNP   5 mg at 05/13/22 0509       Allergies:  No Known Allergies    Problem List:    Patient Active Problem List   Diagnosis Code    Abdominal pain R10.9       Past Medical History: Diagnosis Date    Diabetes mellitus ([facility] [address]        Past Surgical History:  No past surgical history on file. Social History:    Social History     Tobacco Use    Smoking status: Never Smoker    Smokeless tobacco: Never Used   Substance Use Topics    Alcohol use: No                                Counseling given: Not Answered      Vital Signs (Current): There were no vitals filed for this visit.                                            BP Readings from Last 3 Encounters:   05/13/22 138/77   04/13/22 130/77       NPO Status:                                                                                 BMI:   Wt Readings from Last 3 Encounters:   05/13/22 174 lb 2.6 oz (79 kg)   04/13/22 184 lb 11.9 oz (83.8 kg)     There is no height or weight on file to calculate BMI.    CBC:   Lab Results   Component Value Date    WBC 7.8 05/12/2022    RBC 4.10 05/12/2022    HGB 12.6 05/12/2022    HCT 36.9 05/12/2022    MCV 89.9 05/12/2022    RDW 12.3 05/12/2022     05/12/2022       CMP:   Lab Results   Component Value Date     05/12/2022    K 3.7 05/12/2022     05/12/2022    CO2 23 05/12/2022    BUN 5 05/12/2022    CREATININE <0.5 05/12/2022    GFRAA >60 05/12/2022    AGRATIO 0.9 05/06/2022    LABGLOM >60 05/12/2022    GLUCOSE 198 05/12/2022    PROT 8.0 05/06/2022    CALCIUM 9.2 05/12/2022    BILITOT 1.2 05/06/2022    ALKPHOS 182 05/06/2022    AST 14 05/06/2022    ALT 11 05/06/2022       POC Tests:   Recent Labs     05/13/22  0638   POCGLU 159*       Coags: No results found for: PROTIME, INR, APTT    HCG (If Applicable): No results found for: PREGTESTUR, PREGSERUM, HCG, HCGQUANT     ABGs: No results found for: PHART, PO2ART, AYF7FDR, NOG3NZP, BEART, I1LXVXWH     Type & Screen (If Applicable):  No results found for: LABABO, LABRH    Drug/Infectious Status (If Applicable):  No results found for: HIV, HEPCAB    COVID-19 Screening (If Applicable):   Lab Results   Component Value Date    COVID19 Not Detected 05/11/2022           Anesthesia Evaluation  Patient summary reviewed and Nursing notes reviewed no history of anesthetic complications:   Airway: Mallampati: II  TM distance: >3 FB   Neck ROM: full  Mouth opening: > = 3 FB Dental:      Comment: No loose teeth    Pulmonary:Negative Pulmonary ROS                              Cardiovascular:Negative CV ROS            Rhythm: regular                      Neuro/Psych:   Negative Neuro/Psych ROS              GI/Hepatic/Renal:        (-) hiatal hernia, GERD, PUD, hepatitis, liver disease, no renal disease, bowel prep and no morbid obesity       Endo/Other:    (+) Diabetes (A1c 12.0 May 2022)poorly controlled, , no malignancy/cancer. (-) hypothyroidism, hyperthyroidism, blood dyscrasia, arthritis, no electrolyte abnormalities, no malignancy/cancer                ROS comment: Sepsis, complicated prolonged urologic infection. ID on board. Abdominal:             Vascular: negative vascular ROS. Other Findings:               Anesthesia Plan      general     ASA 3       Induction: intravenous. MIPS: Postoperative opioids intended and Prophylactic antiemetics administered. Anesthetic plan and risks discussed with patient. Plan discussed with CRNA. Tristian Potter MD   5/13/2022        This pre-anesthesia assessment may be used as a history and physical.    DOS STAFF ADDENDUM:    Pt seen and examined, chart reviewed (including anesthesia, drug and allergy history). No interval changes to history and physical examination. Anesthetic plan, risks, benefits, alternatives, and personnel involved discussed with patient. Patient verbalized an understanding and agrees to proceed.       Tristian Potter MD  May 13, 2022  6:54 AM

## 2022-05-13 NOTE — PROGRESS NOTES
Pt complain of burning and soreness in right groin and dry mouth. Medicated with Fentanyl and given few ice chips. Repositioned on bed, HOB up 35 degrees.

## 2022-05-13 NOTE — PROGRESS NOTES
Surgical History:    Past Surgical History:   Procedure Laterality Date    TESTICLE REMOVAL Right 5/13/2022    RIGHT INGUINAL ORCHIECTOMY performed by Beny Byrnes MD at [facility]        Current Medications:    Outpatient Medications Marked as Taking for the 5/6/22 encounter [facility] Encounter)   Medication Sig Dispense Refill    ciprofloxacin (CIPRO) 250 MG tablet Take 250 mg by mouth 2 times daily For 14 days      ibuprofen (ADVIL;MOTRIN) 600 MG tablet Take 600 mg by mouth every 6 hours as needed for Pain         Allergies:  Patient has no known allergies. Immunizations : There is no immunization history on file for this patient.     Social History:    Social History     Tobacco Use    Smoking status: Never Smoker    Smokeless tobacco: Never Used   Substance Use Topics    Alcohol use: No    Drug use: No     Social History     Tobacco Use   Smoking Status Never Smoker   Smokeless Tobacco Never Used      Family History : no DVT no COPD       REVIEW OF SYSTEMS:     Constitutional: fevers + , chillS+  night sweats  Eyes:  negative for blurred vision, eye discharge, visual disturbance   HEENT:  negative for hearing loss, ear drainage,nasal congestion  Respiratory:  negative for cough, shortness of breath or hemoptysis   Cardiovascular:  negative for chest pain, palpitations, syncope  Gastrointestinal:  negative for nausea, vomiting, diarrhea, constipation, abdominal pain  Genitourinary:  negative for frequency, dysuria, urinary incontinence, hematuria SCROTAL PAIN + SWELLING+   Hematologic/Lymphatic:  negative for easy bruising, bleeding and lymphadenopathy  Allergic/Immunologic:  negative for recurrent infections, angioedema, anaphylaxis   Endocrine:  negative for weight changes, polyuria, polydipsia and polyphagia  Musculoskeletal:  negative for joint  pain, swelling, decreased range of motion  Integumentary: No rashes, skin lesions  Neurological:  negative for headaches, slurred speech, unilateral weakness  Psychiatric: negative for hallucinations,confusion,agitation.                 PHYSICAL EXAM:      Vitals:    /72   Pulse 70   Temp 97.6 °F (36.4 °C) (Oral)   Resp 16   Ht 5' 9\" (1.753 m)   Wt 174 lb 2.6 oz (79 kg)   SpO2 95%   BMI 25.72 kg/m²     General Appearance: alert,in some  acute distress, no pallor, no icterus   Skin: warm and dry, no rash or erythema  Head: normocephalic and atraumatic  Eyes: pupils equal, round, and reactive to light, conjunctivae normal  ENT: tympanic membrane, external ear and ear canal normal bilaterally, nose without deformity, nasal mucosa and turbinates normal without polyps  Neck: supple and non-tender without mass, no thyromegaly  no cervical lymphadenopathy  Pulmonary/Chest: clear to auscultation bilaterally- no wheezes, rales or rhonchi, normal air movement, no respiratory distress  Cardiovascular: normal rate, regular rhythm, normal S1 and S2, no murmurs, rubs, clicks, or gallops, no carotid bruits  Abdomen: soft, non-tender, non-distended, normal bowel sounds, no masses or organomegaly  Extremities: no cyanosis, clubbing or edema  Musculoskeletal: normal range of motion, no joint swelling, deformity or tenderness  Integumentary: No rashes, no abnormal skin lesions, no petechiae  Neurologic: reflexes normal and symmetric, no cranial nerve deficit  Psych:  Orientation, sensorium, mood normal            Lines:IV  Rt groin inguinal area on going swelling local pain redness some improvement with cellulitis and Rt scrotal swelling and pain no skin changes         Data Review:    CBC:   Lab Results   Component Value Date    WBC 8.3 05/13/2022    HGB 12.8 (L) 05/13/2022    HCT 38.4 (L) 05/13/2022    MCV 90.6 05/13/2022     05/13/2022     RENAL:   Lab Results   Component Value Date    CREATININE 0.5 (L) 05/13/2022    BUN 7 05/13/2022     05/13/2022    K 3.8 05/13/2022     05/13/2022    CO2 22 05/13/2022     SED RATE:   Lab Results   Component should only be used for uncomplicated UTI         for E.coli or Klebsiella pneumoniae.           cefepime Sensitive <=2 mcg/mL BACTERIAL SUSCEPTIBILITY PANEL BY KRISTI       cefTRIAXone Sensitive <=1 mcg/mL BACTERIAL SUSCEPTIBILITY PANEL BY KRISTI       cefuroxime Sensitive <=4 mcg/mL BACTERIAL SUSCEPTIBILITY PANEL BY KRISTI       ciprofloxacin Sensitive <=1 mcg/mL BACTERIAL SUSCEPTIBILITY PANEL BY KRISTI       ertapenem Sensitive <=0.5 mcg/mL BACTERIAL SUSCEPTIBILITY PANEL BY KRISTI       gentamicin Sensitive <=4 mcg/mL BACTERIAL SUSCEPTIBILITY PANEL BY KRISTI       meropenem Sensitive <=1 mcg/mL BACTERIAL SUSCEPTIBILITY PANEL BY KRISTI       nitrofurantoin Resistant >64 mcg/mL BACTERIAL SUSCEPTIBILITY PANEL BY KRISTI       piperacillin-tazobactam Sensitive <=16 mcg/mL BACTERIAL SUSCEPTIBILITY PANEL BY KRISTI       trimethoprim-sulfamethoxazole Sensitive <=2/38 mcg/mL BACTERIAL SUSCEPTIBILITY PANEL BY KRISTI             Narrative  Performed by: [facility]  ORDER#: [id number]                          ORDERED BY: Anson Omalley   SOURCE: Urine Clean Catch                  COLLECTED:  04/13/22                Procedure Component Value Units Date/Time   Culture, Blood 2 [[id number]] Collected: 05/06/22 1359   Order Status: Sent Specimen: Blood Updated: 05/06/22 1420   Culture, Blood 1 [[id number]] Collected: 05/06/22 1359   Order Status: Sent Specimen: Blood Updated: 05/06/22 1420       Respiratory Culture:  Lab Results   Component Value Date    LABGRAM  05/13/2022     No Epithelial Cells seen  No WBCs or organisms seen       AFB:No results found for: [facility]  Viral Culture:  Lab Results   Component Value Date    COVID19 Not Detected 05/11/2022     Urine Culture:   No results for input(s): LABURIN in the last 72 hours. IMAGING:    CT ABDOMEN PELVIS W IV CONTRAST Additional Contrast? None   Final Result   Worsening right epididymal orchitis as described above with a small fluid   collection in the pelvis as measured above. CT ABDOMEN PELVIS W IV CONTRAST Additional Contrast? None   Final Result   Worsening right epididymal orchitis as described above with a small fluid   collection in the pelvis as measured above.              USG sCROTUM :  4/13/22     Impression   Findings are most suggestive of right epididymo-orchitis.  Clinical follow-up   to resolution is recommended.       Flow was seen within each testicle, arguing against acute torsion.       3 mm left epididymal cyst or spermatocele.       RECOMMENDATIONS:   Unavailable             All the pertinent images and reports for the current Hospitalization were reviewed by me     Scheduled Meds:   insulin glargine  0.25 Units/kg SubCUTAneous Nightly    sodium chloride flush  10 mL IntraVENous 2 times per day    enoxaparin  40 mg SubCUTAneous Daily    piperacillin-tazobactam  4,500 mg IntraVENous Q8H    insulin lispro  0-12 Units SubCUTAneous TID WC    insulin lispro  0-6 Units SubCUTAneous Nightly    linezolid  600 mg IntraVENous Q12H       Continuous Infusions:   sodium chloride      sodium chloride Stopped (05/13/22 0512)    dextrose      sodium chloride Stopped (05/13/22 0631)       PRN Meds:  sodium chloride, morphine, ibuprofen, sodium chloride flush, sodium chloride, potassium chloride **OR** potassium alternative oral replacement **OR** potassium chloride, potassium chloride, magnesium sulfate, promethazine **OR** ondansetron, magnesium hydroxide, acetaminophen **OR** acetaminophen, glucose, dextrose, glucagon (rDNA), dextrose, oxyCODONE      Assessment:     Patient Active Problem List   Diagnosis    Abdominal pain       Sepsis resolving   Fevers on going   WBC normal from partial treatment   Rt inguinal fluid collection concern for abscess on the CT  Rt epididymo orchitis with extension into inguinal canal  UTI recent with Klebsiella pneumoniae partially treated  DM poor control ( NOT TAKING MEDS > 2 YR due to lack of INSURANCE)  Remote HISTORY of Gonorrhea  CT abd/pelvis with worsening of Orchitis compared to before        UNfortunately has not responded to out patient treatment as expected despite appropriate course of abx makes me wonder if he has not Taken his MEDS  Due to Lack of INSURANCE or due to Uncontrolled DM he is having complications     As this is some what unusual if Klebsiella is the Culprit Organism was sensitive to Bactrim, Cipro  Etc       He has made minimal progress on broad spectrum IV abx and most likely needs surgery will d/w Urology about their plans    Urology following closely is a plan for possible surgical intervention we will request operative cultures including fungal and AFB    Check TB QuantiFERON - and He is BCG vaccinated     S/p Rt Orchiectomy and drainage of the inguinal area OR cx in process     Labs, Microbiology, Radiology and all the pertinent results from current hospitalization and  care every where were reviewed  by me as a part of the evaluation   Plan:   1. IV Zosyn x 4.5 gm Q 8hrs HIGH dose   2. Cont   IV Linezolid x 600 mg q 12 hrs  3. Cont  IV Fluconazole x 400 mg   4. Control DM    5. HbA1C very elevated    6. bLOOD CX NGDT  7. HIV -ve, Hepatitis, Gonorrhea -ve, Chlamydia -ve, Syphilis screen  -ve  8. ESR. CRP noted   9. Social service assistance for antibiotic help  10. Per  Urology team going to OR soon - ? Tomorrow    11. OR cx for [employer], fUNGAL in  Process  12. Do not anticipate any change in IV abx through the weekend      Discussed with patient/Family and Nursing   Risk of Complications/Morbidity: High      · Illness(es)/ Infection present that pose threat to bodily function. · There is potential for severe exacerbation of infection/side effects of treatment.   · Therapy requires intensive monitoring for antimicrobial agent toxicity        Discussed with patient/Family and Nursing staff     Thanks for allowing me to participate in your patient's care and please call me with any questions or concerns.     Kwadwo Good MD  Infectious Disease  [facility] Physician  Phone: [phone]   Fax : [phone]

## 2022-05-13 NOTE — PROGRESS NOTES
Pt returned to room 4273 from PACU. Pt A/O requesting water and something to eat. Pt also c/o of pain. Pain meds given per MAR. Pt incision site has moderate serosanguinous drainage. Pt resting comfortably in bed with call light in reach, bed locked, in lowest position, and bed alarm in place. Will continue to monitor.     Electronically signed by Zhen Nichols RN on 5/13/2022 at 11:14 AM

## 2022-05-13 NOTE — PROGRESS NOTES
Pt dozing but awakens self and complain of dry mouth. Given few ice chips. VSS. Will call report. No

## 2022-05-13 NOTE — PROGRESS NOTES
Hospitalist Progress Note      PCP: No primary care provider on file. Chief Complaint. Patient is a 69-year-old male with remote past medical history of diabetes mellitus but not taking any medications who presents to the hospital for right scrotum pain and groin pain. According to the patient it can go up to 10/10 intensity, radiates to right groin, associated with swelling in his groin, he mentions he has only 1 sexual partner, his partner does not have any STD at this time, he denies previous history of STDs, he is not sure of any exposure to HIV. Patient denied fevers chills chest pain nausea vomiting diarrhea dysuria. Patient mentions he was recently started on oral antibiotics as outpatient but the swelling has been worsening so he decided to come to the hospital at the request of urology.     Date of Admission: 5/6/2022    Subjective: Patient was seen and evaluated at the bedside, status post right inguinal orchiectomy, mentions tolerated well, denies chest pain, nausea, vomiting, shortness of breath, fever or chills    Medications:  Reviewed    Infusion Medications    sodium chloride      sodium chloride Stopped (05/13/22 0512)    dextrose      sodium chloride Stopped (05/13/22 0631)     Scheduled Medications    insulin glargine  0.25 Units/kg SubCUTAneous Nightly    sodium chloride flush  10 mL IntraVENous 2 times per day    enoxaparin  40 mg SubCUTAneous Daily    piperacillin-tazobactam  4,500 mg IntraVENous Q8H    insulin lispro  0-12 Units SubCUTAneous TID WC    insulin lispro  0-6 Units SubCUTAneous Nightly    linezolid  600 mg IntraVENous Q12H     PRN Meds: sodium chloride, morphine, ibuprofen, sodium chloride flush, sodium chloride, potassium chloride **OR** potassium alternative oral replacement **OR** potassium chloride, potassium chloride, magnesium sulfate, promethazine **OR** ondansetron, magnesium hydroxide, acetaminophen **OR** acetaminophen, glucose, dextrose, glucagon (rDNA), dextrose, oxyCODONE      Intake/Output Summary (Last 24 hours) at 5/13/2022 1704  Last data filed at 5/13/2022 1003  Gross per 24 hour   Intake 8317.98 ml   Output 1575 ml   Net 6742.98 ml       Physical Exam Performed:    /67   Pulse 74   Temp 98.4 °F (36.9 °C) (Oral)   Resp 16   Ht 5' 9\" (1.753 m)   Wt 174 lb 2.6 oz (79 kg)   SpO2 95%   BMI 25.72 kg/m²     General appearance: NAD, on RA, holding complete conversations  HEENT:  Conjunctivae/corneas clear. Neck: Supple, with full range of motion. Respiratory:  Normal respiratory effort. Clear to auscultation, bilaterally without Rales/Wheezes/Rhonchi. Cardiovascular: Regular rate and rhythm with normal S1/S2 without murmurs or rubs  Abdomen: Soft, non-tender, non-distended, normal bowel sounds. Musculoskeletal: No cyanosis or edema bilaterally  Neurologic:  without any focal sensory/motor deficits. grossly non-focal.  Psychiatric: Alert and oriented, Normal mood  Peripheral Pulses: +2 palpable, equal bilaterally       Labs:   Recent Labs     05/11/22  0732 05/12/22  0717 05/13/22  0634   WBC 7.2 7.8 8.3   HGB 12.4* 12.6* 12.8*   HCT 35.8* 36.9* 38.4*    292 305     Recent Labs     05/11/22  0732 05/12/22  0717 05/13/22  0634    137 138   K 3.6 3.7 3.8    103 101   CO2 24 23 22   BUN 5* 5* 7   CREATININE <0.5* <0.5* 0.5*   CALCIUM 8.8 9.2 9.3     No results for input(s): AST, ALT, BILIDIR, BILITOT, ALKPHOS in the last 72 hours. No results for input(s): INR in the last 72 hours. No results for input(s): Preston Altes in the last 72 hours.     Urinalysis:      Lab Results   Component Value Date    NITRU Negative 05/06/2022    WBCUA 397 04/13/2022    BACTERIA Rare 02/27/2014    RBCUA 20 04/13/2022    BLOODU Negative 05/06/2022    SPECGRAV 1.052 05/06/2022    GLUCOSEU >=1000 05/06/2022       Radiology:  CT ABDOMEN PELVIS W IV CONTRAST Additional Contrast? None   Final Result   Worsening right epididymal orchitis as described above with a small fluid   collection in the pelvis as measured above. Assessment/Plan:    Active Hospital Problems    Diagnosis     Abdominal pain [R10.9]      Priority: Medium     Patient is a 39-year-old male with remote past medical history of diabetes mellitus but not taking any medications who presents to the hospital for right scrotum pain and groin pain. According to the patient it can go up to 10/10 intensity, radiates to right groin, associated with swelling in his groin, he mentions he has only 1 sexual partner, his partner does not have any STD at this time, he denies previous history of STDs, he is not sure of any exposure to HIV. Patient denied fevers chills chest pain nausea vomiting diarrhea dysuria. Patient mentions he was recently started on oral antibiotics as outpatient but the swelling has been worsening so he decided to come to the hospital at the request of urology.     Assessment  Worsening right epididymal orchitis  history of diabetes mellitus, medication nonadherence  Hyponatremia  Lactic acidosis  Sepsis present on admission likely secondary to #1        Plan  Infectious disease consulted, continue IV antibiotics per ID  Check blood cultures-negative to date, urine culture-negative lactate  Urology following, no surgical plan at this time, status post right orchiectomy  Continue IV fluid therapy with normal saline  Start weight-based long-acting insulin, insulin sliding scale, increase long-acting insulin, A1c is 12  Diet: ADULT DIET;  Regular; 5 carb choices (75 gm/meal)  Code Status: Full Code    PT/OT Eval Status: ordered    Dispo- status post right inguinal orchiectomy, urology continues to follow, continue IV abx per ID    Elena Hugo MD

## 2022-05-13 NOTE — PROGRESS NOTES
Nutrition Assessment     Type and Reason for Visit: Initial    Nutrition Recommendations/Plan:   Continue Regular; 5 carb choices (75 gm/meal)     Malnutrition Assessment:  Malnutrition Status: No malnutrition    Nutrition Assessment:  LOS. Pt out of room during visit, s/p \"RIGHT INGUINAL ORCHIECTOMY\". Pt admitted with sepsis, worsening right epididymal orchitis, hyponatremia. Hx includes DM. Reviewed EMR, pt usually eating greater than 50% of meals. Denied any nutrition-related issues PTA per nsg screen. Surgicial incision, otherwise skin intact. Continue carb controlled diet. Nutrition Related Findings:   Reviewed labs Wound Type: Surgical Incision    Current Nutrition Therapies:    ADULT DIET;  Regular; 5 carb choices (75 gm/meal)    Anthropometric Measures:  · Height: 5' 9\" (175.3 cm)  · Current Body Wt: 174 lb (78.9 kg)   · BMI: 25.7    Nutrition Diagnosis:   · No nutrition diagnosis at this time related to   as evidenced by        Nutrition Interventions:   Food and/or Nutrient Delivery: Continue Current Diet  Nutrition Education/Counseling: No recommendation at this time  Coordination of Nutrition Care: Continue to monitor while inpatient       Goals:     Goals: Meet at least 75% of estimated needs       Nutrition Monitoring and Evaluation:   Behavioral-Environmental Outcomes: None Identified  Food/Nutrient Intake Outcomes: Food and Nutrient Intake  Physical Signs/Symptoms Outcomes: Biochemical Data,Nutrition Focused Physical Findings,Skin,Weight    Discharge Planning:    No discharge needs at this time     Zuhair Nuno, [address] [place] [address],   Contact: [phone]

## 2022-05-13 NOTE — PROGRESS NOTES
Pt groggy on arrival to PACU. Pt moan and points to right groin. Medicated by Pia Thompson. VSS. Pt resting.

## 2022-05-13 NOTE — PROGRESS NOTES
Spoke with lab person . Explained that tissue specimen for assorted cultures as well as pathology . Orders and stickers written from the floor no OR paper work for culture. OR paper work sent for pathology. Sent dry. Swab sent for cultures  with OR paperwork. JOSE GUADALUPE Smith taking specimens to lab. Explained no formalin until all the cultures are done. 12 Lexx Cantor states that he spoke with lab and they are aware of  No formalin tilll culturres done.

## 2022-05-13 NOTE — FLOWSHEET NOTE
Pt to be picked up for surgery at 0630. Pt slept off and on overnight. Medicated for pain per orders. NPO since midnight. Antibiotics/fluids infusing per orders.

## 2022-05-13 NOTE — PLAN OF CARE
Problem: Discharge Planning  Goal: Discharge to home or other facility with appropriate resources  Outcome: Progressing  Flowsheets (Taken 5/13/2022 1205)  Discharge to home or other facility with appropriate resources: Identify barriers to discharge with patient and caregiver     Problem: Pain  Goal: Verbalizes/displays adequate comfort level or baseline comfort level  Outcome: Progressing     Problem: Safety - Adult  Goal: Free from fall injury  Outcome: Progressing     Problem: Skin/Tissue Integrity  Goal: Absence of new skin breakdown  Description: 1. Monitor for areas of redness and/or skin breakdown  2. Assess vascular access sites hourly  3. Every 4-6 hours minimum:  Change oxygen saturation probe site  4. Every 4-6 hours:  If on nasal continuous positive airway pressure, respiratory therapy assess nares and determine need for appliance change or resting period.   Outcome: Progressing     Problem: ABCDS Injury Assessment  Goal: Absence of physical injury  Outcome: Progressing     Problem: Chronic Conditions and Co-morbidities  Goal: Patient's chronic conditions and co-morbidity symptoms are monitored and maintained or improved  Outcome: Progressing  Flowsheets (Taken 5/13/2022 1205)  Care Plan - Patient's Chronic Conditions and Co-Morbidity Symptoms are Monitored and Maintained or Improved: Monitor and assess patient's chronic conditions and comorbid symptoms for stability, deterioration, or improvement

## 2022-05-13 NOTE — ANESTHESIA POSTPROCEDURE EVALUATION
Department of Anesthesiology  Postprocedure Note    Patient: Bethanie Hurt  MRN: [id number]  YOB: 1985  Date of evaluation: 5/13/2022  Time:  10:46 AM     Procedure Summary     Date: 05/13/22 Room / Location: [address] [place] [address]    Anesthesia Start: 6288 Anesthesia Stop: 5300    Procedure: RIGHT INGUINAL ORCHIECTOMY (Right Scrotum) Diagnosis:       Inflammatory disorders of scrotum      (INFLAMMATORY DISORDER OF SCROTUM)    Surgeons: Darryl Schofield MD Responsible Provider: Marilyn Geiger MD    Anesthesia Type: general ASA Status: 3          Anesthesia Type: No value filed. Giselle Phase I: Giselle Score: 9    Giselle Phase II:      Last vitals: Reviewed and per EMR flowsheets.        Anesthesia Post Evaluation    Patient location during evaluation: PACU  Patient participation: complete - patient participated  Level of consciousness: awake  Pain score: 2  Airway patency: patent  Nausea & Vomiting: no nausea and no vomiting  Complications: no  Cardiovascular status: blood pressure returned to baseline  Respiratory status: acceptable  Hydration status: euvolemic

## 2022-05-13 NOTE — PLAN OF CARE
Problem: Discharge Planning  Goal: Discharge to home or other facility with appropriate resources  5/12/2022 2143 by Carlito Francois RN  Outcome: Progressing  5/12/2022 0905 by Ellen Thomas RN  Outcome: Progressing   Continuing to work with patient and health care team on discharge plan. Discharge instructions and medication management will be reviewed prior to discharge. Problem: Pain  Goal: Verbalizes/displays adequate comfort level or baseline comfort level  5/12/2022 2143 by Carlito Francois RN  Outcome: Progressing  5/12/2022 0905 by Ellen Thomas RN  Outcome: Not Progressing   Pt able to express presence/absence of pain and rate pain appropriately using numerical scale. Pain/discomfort being managed with PRN analgesics per MD orders (see MAR). Pain assessed every shift and after interventions. Problem: Safety - Adult  Goal: Free from fall injury  5/12/2022 2143 by Carlito Francois RN  Outcome: Progressing  5/12/2022 0905 by Ellen Thomas RN  Outcome: Progressing  Flowsheets (Taken 5/12/2022 0904)  Free From Fall Injury: Instruct family/caregiver on patient safety     Problem: Skin/Tissue Integrity  Goal: Absence of new skin breakdown  Description: 1. Monitor for areas of redness and/or skin breakdown  2. Assess vascular access sites hourly  3. Every 4-6 hours minimum:  Change oxygen saturation probe site  4. Every 4-6 hours:  If on nasal continuous positive airway pressure, respiratory therapy assess nares and determine need for appliance change or resting period.   5/12/2022 2143 by Carlito Francois RN  Outcome: Progressing  5/12/2022 0905 by Ellen Thomas RN  Outcome: Progressing     Problem: ABCDS Injury Assessment  Goal: Absence of physical injury  5/12/2022 2143 by Carlito Francois RN  Outcome: Progressing  5/12/2022 0905 by Ellen Thomas RN  Outcome: Progressing  Flowsheets (Taken 5/12/2022 0904)  Absence of Physical Injury: Implement safety measures based on patient assessment     Problem: Chronic Conditions and Co-morbidities  Goal: Patient's chronic conditions and co-morbidity symptoms are monitored and maintained or improved  5/12/2022 2143 by Leandro Bah RN  Outcome: Progressing  5/12/2022 0905 by Brayan Nation RN  Outcome: Progressing

## 2022-05-13 NOTE — INTERVAL H&P NOTE
Update History & Physical    The patient's History and Physical were reviewed - no changes - case disc - ready for OR  Plan: The risks, benefits, expected outcome, and alternative to the recommended procedure have been discussed with the patient. Patient understands and wants to proceed with the procedure.      Electronically signed by Simran Magdaleno MD on 5/13/2022 at 7:06 AM

## 2022-05-13 NOTE — BRIEF OP NOTE
Brief Postoperative Note      Patient: Sabas Hernadez  YOB: 1985  MRN: [id number]    Date of Procedure: 5/13/2022    Pre-Op Diagnosis: INFLAMMATORY DISORDER OF SCROTUM    Post-Op Diagnosis: right orchitis with abscess       Procedure(s):  RIGHT INGUINAL ORCHIECTOMY    Surgeon(s):  Sandro Carbone MD    Assistant:  Surgical Assistant: Phill Bryan    Anesthesia: General    Estimated Blood Loss (mL): less than 929     Complications: None    Specimens:   ID Type Source Tests Collected by Time Destination   1 : fluid from abcess right scrotum Specimen Scrotum CULTURE, FUNGUS, CULTURE, SURGICAL, CULTURE WITH SMEAR, ACID FAST BACILLIUS Sandro Carbone MD 5/13/2022 0759    A : right testicle Specimen Scrotum SURGICAL PATHOLOGY Sandro Carbone MD 5/13/2022 4626        Implants:  * No implants in log *      Drains: * No LDAs found *    Findings: abscess     Electronically signed by Raymon Aguirre MD on 5/13/2022 at 8:42 AM

## 2022-05-14 LAB
ANION GAP SERPL CALCULATED.3IONS-SCNC: 12 MMOL/L (ref 3–16)
BASOPHILS ABSOLUTE: 0 K/UL (ref 0–0.2)
BASOPHILS RELATIVE PERCENT: 0.5 %
BUN BLDV-MCNC: 8 MG/DL (ref 7–20)
CALCIUM SERPL-MCNC: 8.8 MG/DL (ref 8.3–10.6)
CHLORIDE BLD-SCNC: 99 MMOL/L (ref 99–110)
CO2: 24 MMOL/L (ref 21–32)
CREAT SERPL-MCNC: <0.5 MG/DL (ref 0.9–1.3)
EOSINOPHILS ABSOLUTE: 0.1 K/UL (ref 0–0.6)
EOSINOPHILS RELATIVE PERCENT: 1 %
GFR AFRICAN AMERICAN: >60
GFR NON-AFRICAN AMERICAN: >60
GLUCOSE BLD-MCNC: 239 MG/DL (ref 70–99)
GLUCOSE BLD-MCNC: 243 MG/DL (ref 70–99)
GLUCOSE BLD-MCNC: 279 MG/DL (ref 70–99)
GLUCOSE BLD-MCNC: 330 MG/DL (ref 70–99)
GLUCOSE BLD-MCNC: 386 MG/DL (ref 70–99)
HCT VFR BLD CALC: 29 % (ref 40.5–52.5)
HEMOGLOBIN: 10.1 G/DL (ref 13.5–17.5)
LYMPHOCYTES ABSOLUTE: 2 K/UL (ref 1–5.1)
LYMPHOCYTES RELATIVE PERCENT: 22.6 %
MCH RBC QN AUTO: 30.7 PG (ref 26–34)
MCHC RBC AUTO-ENTMCNC: 34.6 G/DL (ref 31–36)
MCV RBC AUTO: 88.6 FL (ref 80–100)
MONOCYTES ABSOLUTE: 0.8 K/UL (ref 0–1.3)
MONOCYTES RELATIVE PERCENT: 8.5 %
NEUTROPHILS ABSOLUTE: 6.1 K/UL (ref 1.7–7.7)
NEUTROPHILS RELATIVE PERCENT: 67.4 %
PDW BLD-RTO: 12.3 % (ref 12.4–15.4)
PERFORMED ON: ABNORMAL
PLATELET # BLD: 320 K/UL (ref 135–450)
PMV BLD AUTO: 6.9 FL (ref 5–10.5)
POTASSIUM REFLEX MAGNESIUM: 4 MMOL/L (ref 3.5–5.1)
QUANTI TB GOLD PLUS: NEGATIVE
QUANTI TB1 MINUS NIL: 0.01 IU/ML (ref 0–0.34)
QUANTI TB2 MINUS NIL: 0.01 IU/ML (ref 0–0.34)
QUANTIFERON MITOGEN: >10 IU/ML
QUANTIFERON NIL: 0.01 IU/ML
RBC # BLD: 3.28 M/UL (ref 4.2–5.9)
SODIUM BLD-SCNC: 135 MMOL/L (ref 136–145)
WBC # BLD: 9 K/UL (ref 4–11)

## 2022-05-14 PROCEDURE — 94760 N-INVAS EAR/PLS OXIMETRY 1: CPT

## 2022-05-14 PROCEDURE — 6360000002 HC RX W HCPCS: Performed by: UROLOGY

## 2022-05-14 PROCEDURE — 80048 BASIC METABOLIC PNL TOTAL CA: CPT

## 2022-05-14 PROCEDURE — 0V950ZZ DRAINAGE OF SCROTUM, OPEN APPROACH: ICD-10-PCS | Performed by: UROLOGY

## 2022-05-14 PROCEDURE — 36415 COLL VENOUS BLD VENIPUNCTURE: CPT

## 2022-05-14 PROCEDURE — 0VT90ZZ RESECTION OF RIGHT TESTIS, OPEN APPROACH: ICD-10-PCS | Performed by: UROLOGY

## 2022-05-14 PROCEDURE — 6370000000 HC RX 637 (ALT 250 FOR IP): Performed by: UROLOGY

## 2022-05-14 PROCEDURE — 6370000000 HC RX 637 (ALT 250 FOR IP): Performed by: INTERNAL MEDICINE

## 2022-05-14 PROCEDURE — 2580000003 HC RX 258: Performed by: UROLOGY

## 2022-05-14 PROCEDURE — 02HV33Z INSERTION OF INFUSION DEVICE INTO SUPERIOR VENA CAVA, PERCUTANEOUS APPROACH: ICD-10-PCS | Performed by: UROLOGY

## 2022-05-14 PROCEDURE — 2580000003 HC RX 258: Performed by: INTERNAL MEDICINE

## 2022-05-14 PROCEDURE — 1200000000 HC SEMI PRIVATE

## 2022-05-14 PROCEDURE — 85025 COMPLETE CBC W/AUTO DIFF WBC: CPT

## 2022-05-14 RX ORDER — INSULIN GLARGINE 100 [IU]/ML
20 INJECTION, SOLUTION SUBCUTANEOUS NIGHTLY
Status: DISCONTINUED | OUTPATIENT
Start: 2022-05-14 | End: 2022-05-15

## 2022-05-14 RX ORDER — METFORMIN HYDROCHLORIDE 500 MG/1
500 TABLET, EXTENDED RELEASE ORAL 2 TIMES DAILY WITH MEALS
Status: DISCONTINUED | OUTPATIENT
Start: 2022-05-14 | End: 2022-05-15

## 2022-05-14 RX ORDER — INSULIN LISPRO 100 [IU]/ML
0.08 INJECTION, SOLUTION INTRAVENOUS; SUBCUTANEOUS
Status: DISCONTINUED | OUTPATIENT
Start: 2022-05-14 | End: 2022-05-18 | Stop reason: HOSPADM

## 2022-05-14 RX ORDER — INSULIN GLARGINE 100 [IU]/ML
15 INJECTION, SOLUTION SUBCUTANEOUS 2 TIMES DAILY
Status: DISCONTINUED | OUTPATIENT
Start: 2022-05-14 | End: 2022-05-14

## 2022-05-14 RX ADMIN — INSULIN GLARGINE 20 UNITS: 100 INJECTION, SOLUTION SUBCUTANEOUS at 20:54

## 2022-05-14 RX ADMIN — LINEZOLID 600 MG: 600 INJECTION, SOLUTION INTRAVENOUS at 20:45

## 2022-05-14 RX ADMIN — SODIUM CHLORIDE: 9 INJECTION, SOLUTION INTRAVENOUS at 20:44

## 2022-05-14 RX ADMIN — MORPHINE SULFATE 4 MG: 4 INJECTION, SOLUTION INTRAMUSCULAR; INTRAVENOUS at 10:41

## 2022-05-14 RX ADMIN — INSULIN LISPRO 10 UNITS: 100 INJECTION, SOLUTION INTRAVENOUS; SUBCUTANEOUS at 12:17

## 2022-05-14 RX ADMIN — OXYCODONE 5 MG: 5 TABLET ORAL at 12:15

## 2022-05-14 RX ADMIN — OXYCODONE 5 MG: 5 TABLET ORAL at 20:46

## 2022-05-14 RX ADMIN — MORPHINE SULFATE 4 MG: 4 INJECTION, SOLUTION INTRAMUSCULAR; INTRAVENOUS at 17:14

## 2022-05-14 RX ADMIN — PIPERACILLIN AND TAZOBACTAM 4500 MG: 4; .5 INJECTION, POWDER, LYOPHILIZED, FOR SOLUTION INTRAVENOUS at 20:54

## 2022-05-14 RX ADMIN — METFORMIN HYDROCHLORIDE 500 MG: 500 TABLET, EXTENDED RELEASE ORAL at 17:14

## 2022-05-14 RX ADMIN — MORPHINE SULFATE 4 MG: 4 INJECTION, SOLUTION INTRAMUSCULAR; INTRAVENOUS at 08:15

## 2022-05-14 RX ADMIN — IBUPROFEN 400 MG: 400 TABLET ORAL at 20:46

## 2022-05-14 RX ADMIN — INSULIN LISPRO 8 UNITS: 100 INJECTION, SOLUTION INTRAVENOUS; SUBCUTANEOUS at 17:14

## 2022-05-14 RX ADMIN — PIPERACILLIN AND TAZOBACTAM 4500 MG: 4; .5 INJECTION, POWDER, LYOPHILIZED, FOR SOLUTION INTRAVENOUS at 12:16

## 2022-05-14 RX ADMIN — MORPHINE SULFATE 4 MG: 4 INJECTION, SOLUTION INTRAMUSCULAR; INTRAVENOUS at 00:26

## 2022-05-14 RX ADMIN — PIPERACILLIN AND TAZOBACTAM 4500 MG: 4; .5 INJECTION, POWDER, LYOPHILIZED, FOR SOLUTION INTRAVENOUS at 04:16

## 2022-05-14 RX ADMIN — INSULIN LISPRO 3 UNITS: 100 INJECTION, SOLUTION INTRAVENOUS; SUBCUTANEOUS at 20:54

## 2022-05-14 RX ADMIN — LINEZOLID 600 MG: 600 INJECTION, SOLUTION INTRAVENOUS at 08:18

## 2022-05-14 RX ADMIN — INSULIN LISPRO 4 UNITS: 100 INJECTION, SOLUTION INTRAVENOUS; SUBCUTANEOUS at 08:19

## 2022-05-14 RX ADMIN — MORPHINE SULFATE 4 MG: 4 INJECTION, SOLUTION INTRAMUSCULAR; INTRAVENOUS at 04:19

## 2022-05-14 RX ADMIN — INSULIN LISPRO 6 UNITS: 100 INJECTION, SOLUTION INTRAVENOUS; SUBCUTANEOUS at 17:15

## 2022-05-14 RX ADMIN — ENOXAPARIN SODIUM 40 MG: 100 INJECTION SUBCUTANEOUS at 08:15

## 2022-05-14 RX ADMIN — MORPHINE SULFATE 4 MG: 4 INJECTION, SOLUTION INTRAMUSCULAR; INTRAVENOUS at 06:38

## 2022-05-14 ASSESSMENT — PAIN - FUNCTIONAL ASSESSMENT
PAIN_FUNCTIONAL_ASSESSMENT: PREVENTS OR INTERFERES SOME ACTIVE ACTIVITIES AND ADLS

## 2022-05-14 ASSESSMENT — PAIN SCALES - GENERAL
PAINLEVEL_OUTOF10: 6
PAINLEVEL_OUTOF10: 6
PAINLEVEL_OUTOF10: 7
PAINLEVEL_OUTOF10: 0
PAINLEVEL_OUTOF10: 9
PAINLEVEL_OUTOF10: 7
PAINLEVEL_OUTOF10: 6
PAINLEVEL_OUTOF10: 9
PAINLEVEL_OUTOF10: 7
PAINLEVEL_OUTOF10: 3
PAINLEVEL_OUTOF10: 10

## 2022-05-14 ASSESSMENT — PAIN DESCRIPTION - ONSET: ONSET: ON-GOING

## 2022-05-14 ASSESSMENT — PAIN DESCRIPTION - ORIENTATION
ORIENTATION: RIGHT

## 2022-05-14 ASSESSMENT — PAIN DESCRIPTION - LOCATION
LOCATION: SCROTUM

## 2022-05-14 ASSESSMENT — PAIN DESCRIPTION - FREQUENCY: FREQUENCY: CONTINUOUS

## 2022-05-14 ASSESSMENT — PAIN DESCRIPTION - DESCRIPTORS
DESCRIPTORS: ACHING;DISCOMFORT;DULL;SHARP;SORE
DESCRIPTORS: ACHING

## 2022-05-14 ASSESSMENT — PAIN SCALES - WONG BAKER: WONGBAKER_NUMERICALRESPONSE: 2

## 2022-05-14 ASSESSMENT — PAIN DESCRIPTION - PAIN TYPE: TYPE: SURGICAL PAIN

## 2022-05-14 NOTE — OP NOTE
[address] [place]                                 OPERATIVE REPORT    PATIENT NAME: Parker Delacruz             :        1985  MED REC NO:   [id number]                          ROOM:       1129  ACCOUNT NO:   [de-identified]                           ADMIT DATE: 2022  PROVIDER:     Tate Shirley MD    DATE OF PROCEDURE:  2022    PREOPERATIVE DIAGNOSIS:  Severe right epididymo-orchitis. POSTOPERATIVE DIAGNOSIS:  Severe right epididymo-orchitis with abscess. OPERATIONS PERFORMED:  Right inguinal orchiectomy and drainage of an  abscess. SURGEON:  Tate Shirley MD    ANESTHESIA:  General.    ESTIMATED BLOOD LOSS:  Approximately 100 mL. SPECIMENS:  Right testicle and a portion of the spermatic cord. DRAINS:  Two scrotal Penrose drains. INDICATIONS:  A 27-year-old patient who presented with severe right  epididymo-orchitis, which despite antibiotic therapy became worse,  complicated by persistent fevers. OPERATIVE PROCEDURE:  After appropriate general anesthesia, routine prep  and drape in a spine position, an oblique incision was performed over  the upper part of the scrotum and extending along the inguinal canal  along the path of the cord. The subcutaneous tissue had just some  diffuse edema. Upon exposing the external oblique fascia, a large  purulent abscess cavity was entered. This fluid was swabbed for  culture, but also approximately 10 mL was aspirated and sent for  culture. The patient had just marked swelling and fixation of the cord. The  distal portion of the spermatic cord was followed down into the scrotum. The testicle was very adherent to the scrotal wall. Careful dissection  was performed using a combination of scissors and cautery and  eventually, the testicle was fully mobilized off the scrotal wall. There was no compromise of the scrotal skin.   The scrotum was  invaginated and inspected and good hemostasis was confirmed. The  patient had just marked fixation of the spermatic cord as well as the  abscess cavity extending up into the inguinal canal.  Unfortunately, due  to the tissue fixation and the distortion, there was no success in  identify the ilioinguinal nerve. The cord was mobilized proximally and  below the internal ring, a normal portion of the cord was finally  isolated. Victoria clamps were placed across the cord and the cord was  transected, removing all the inflamed tissue. The cord stump had two 0  Vicryl suture ligatures placed and this resulted in good hemostasis. The scrotal cavity as well as the abscess cavity and groin were now  irrigated with gentamicin irrigation. The area was again reinspected  for good hemostasis. Two separate incisions were performed in the  inferior aspect of the scrotum and a Penrose drain was brought in  through each of these incisions and placed from the scrotum all the way  up to the cord stump in the groin. The abdominal edges were  approximated now with interrupted 2-0 chromic sutures. The skin edges  were not closed because of the infection. Saline-soaked gauze was  placed along the incision, and then a fluff dressing and scrotal support  were placed on the patient. He was awakened and transferred to the recovery room, having tolerated  the procedure well.         Vickie Fang MD    D: 05/14/2022 8:37:08       T: 05/14/2022 12:41:22     [email or web address]  Job#: [id number]     Doc#: [id number]    CC:

## 2022-05-14 NOTE — PROGRESS NOTES
[facility] Number: [id number]  Date of Birth 1985   Today's Date: 5/14/2022      Subjective:  POD# 1 right inguinal orchiectomy. Pain some better. Still with a lot of swelling. Intra-op cultures are pending. ROS: Constitutional: No fever    Vitals:  Vitals:    05/13/22 2243 05/14/22 0421 05/14/22 0638 05/14/22 0800   BP:  116/75  106/67   Pulse:  70  88   Resp: 17 17 18 18   Temp:  97.6 °F (36.4 °C)  97.7 °F (36.5 °C)   TempSrc:  Oral  Oral   SpO2:  96%  95%   Weight:  174 lb 9.7 oz (79.2 kg)     Height:         I/O last 3 completed shifts: In: 51075 [P.O.:600; I.V.:5453.9; IV Piggyback:4299.2]  Out: 7646 [Urine:3950; Blood:100]    Exam:  General: Awake, oriented, no acute distress  Respiratory: Nonlabored breathing  Abdomen: Soft, non-tender, non-distended, no masses  : inguinal incision ok- skin left open to heal by secondary intention. Still with profound scrotal swelling and penile edema.   Penrose drains intact with serosanguinous drainage  Skin: Skin color, texture, turgor normal, no rashes or lesions  Neurologic: no gross deficits    CURRENT MEDICATIONS   Scheduled Meds:   insulin glargine  0.25 Units/kg SubCUTAneous Nightly    sodium chloride flush  10 mL IntraVENous 2 times per day    enoxaparin  40 mg SubCUTAneous Daily    piperacillin-tazobactam  4,500 mg IntraVENous Q8H    insulin lispro  0-12 Units SubCUTAneous TID     insulin lispro  0-6 Units SubCUTAneous Nightly    linezolid  600 mg IntraVENous Q12H     Continuous Infusions:   sodium chloride Stopped (05/13/22 2209)    sodium chloride Stopped (05/13/22 0512)    dextrose      sodium chloride Stopped (05/14/22 0416)     PRN Meds:.sodium chloride, morphine, ibuprofen, sodium chloride flush, sodium chloride, potassium chloride **OR** potassium alternative oral replacement **OR** potassium chloride, potassium chloride, magnesium sulfate, promethazine **OR** ondansetron, magnesium hydroxide, acetaminophen **OR** acetaminophen, glucose, dextrose, glucagon (rDNA), dextrose, oxyCODONE    LABS     Recent Labs     05/12/22  0717 05/13/22  0634 05/14/22  0736   WBC 7.8 8.3 9.0   HGB 12.6* 12.8* 10.1*   HCT 36.9* 38.4* 29.0*    305 320    138 135*   K 3.7 3.8 4.0    101 99   CO2 23 22 24   BUN 5* 7 8   CREATININE <0.5* 0.5* <0.5*   CALCIUM 9.2 9.3 8.8           ASSESSMENT   1. Hospital day # 8  2. Abscess of spermatic cord/ orchitis s/p inguinal orchiectomy 5/14/22  PLAN   1. Continue local wound care. Will remove drains tomorrow  2.  Antibiotics per ID rec    Do Cr MD 5/14/2022 10:58 AM

## 2022-05-14 NOTE — FLOWSHEET NOTE
Pt medicated for pain overnight. Dressing to scrotal area is bloody but intact. Penrose drain to right side of scrotum  is freely draining. Pt's gown is bloody but prefers to have it changed after MD has seen him this morning. MS given per orders. Fluids infusing per orders.

## 2022-05-14 NOTE — PROGRESS NOTES
Hospitalist Progress Note      PCP: No primary care provider on file. Chief Complaint: Groin pain    Brief hospital course  44 y.o. man with hx of gonorrhea, DM uncontrolled last HbA1C > 10 nearly 2 yrs ago and not been on meds due to lack of insurance  - presents with rt groin pain, and being managed for sepsis due to right inguinal abscess and epididymo orchitis with extension into inguinal canal    Date of Admission: 5/6/2022    Subjective:   Feeling better, says he is concerned about his blood sugars  Pain is controlled    Medications:  Reviewed    Infusion Medications    sodium chloride Stopped (05/13/22 2209)    sodium chloride Stopped (05/13/22 0512)    dextrose      sodium chloride Stopped (05/14/22 4936)     Scheduled Medications    insulin glargine  0.25 Units/kg SubCUTAneous Nightly    sodium chloride flush  10 mL IntraVENous 2 times per day    enoxaparin  40 mg SubCUTAneous Daily    piperacillin-tazobactam  4,500 mg IntraVENous Q8H    insulin lispro  0-12 Units SubCUTAneous TID WC    insulin lispro  0-6 Units SubCUTAneous Nightly    linezolid  600 mg IntraVENous Q12H     PRN Meds: sodium chloride, morphine, ibuprofen, sodium chloride flush, sodium chloride, potassium chloride **OR** potassium alternative oral replacement **OR** potassium chloride, potassium chloride, magnesium sulfate, promethazine **OR** ondansetron, magnesium hydroxide, acetaminophen **OR** acetaminophen, glucose, dextrose, glucagon (rDNA), dextrose, oxyCODONE      Intake/Output Summary (Last 24 hours) at 5/14/2022 1312  Last data filed at 5/14/2022 1158  Gross per 24 hour   Intake 3250.04 ml   Output 2775 ml   Net 475.04 ml       Physical Exam Performed:    /67   Pulse 88   Temp 97.7 °F (36.5 °C) (Oral)   Resp 18   Ht 5' 9\" (1.753 m)   Wt 174 lb 9.7 oz (79.2 kg)   SpO2 96%   BMI 25.78 kg/m²     General appearance: NAD, on RA, holding complete conversations  HEENT:  Conjunctivae/corneas clear.   Neck: Supple, with full range of motion. Respiratory:  Normal respiratory effort. Clear to auscultation, bilaterally without Rales/Wheezes/Rhonchi. Cardiovascular: Regular rate and rhythm with normal S1/S2 without murmurs or rubs  Abdomen: Soft, non-tender, non-distended, normal bowel sounds. Musculoskeletal: No cyanosis or edema bilaterally  Neurologic:  without any focal sensory/motor deficits. grossly non-focal.  Psychiatric: Alert and oriented, Normal mood  Peripheral Pulses: +2 palpable, equal bilaterally       Labs:   Recent Labs     05/12/22  0717 05/13/22  0634 05/14/22  0736   WBC 7.8 8.3 9.0   HGB 12.6* 12.8* 10.1*   HCT 36.9* 38.4* 29.0*    305 320     Recent Labs     05/12/22  0717 05/13/22  0634 05/14/22  0736    138 135*   K 3.7 3.8 4.0    101 99   CO2 23 22 24   BUN 5* 7 8   CREATININE <0.5* 0.5* <0.5*   CALCIUM 9.2 9.3 8.8     No results for input(s): AST, ALT, BILIDIR, BILITOT, ALKPHOS in the last 72 hours. No results for input(s): INR in the last 72 hours. No results for input(s): Verlena Jose Alejandro in the last 72 hours. Urinalysis:      Lab Results   Component Value Date    NITRU Negative 05/06/2022    WBCUA 397 04/13/2022    BACTERIA Rare 02/27/2014    RBCUA 20 04/13/2022    BLOODU Negative 05/06/2022    SPECGRAV 1.052 05/06/2022    GLUCOSEU >=1000 05/06/2022       Radiology:  CT ABDOMEN PELVIS W IV CONTRAST Additional Contrast? None   Final Result   Worsening right epididymal orchitis as described above with a small fluid   collection in the pelvis as measured above.                Assessment/Plan:    Active Hospital Problems    Diagnosis     Abdominal pain [R10.9]      Priority: Medium       Assessment  Sepsis present on admission likely due to abscess of spermatic cord/ orchitis s/p inguinal orchiectomy 5/14/22  Type 2 diabetes mellitus, medication non adherence, uncontrolled, a1c 12.0  Hyponatremia  Lactic acidosis  secondary to #1        Plan  Continue IV abx, ID following  Zosyn high dose, Zyvox and fluconazole  Needs better control of hyperglycemia - ASKED HIM TO STOP DRINKING JUICES, HE/FAMILY GETTING HIM LEMONADE FROM [employer]  STRICTER DIET WITH 4 CARBS PER MEAL  ADD METFORMIN, INCREASE DOSE IF HE TOLERATES TODAY  MONITOR FOR HYPOGLYCEMIA  Insulin doses changed  Reviewed urology note - plan to remove drains tomorrow     Diet: ADULT DIET;  Regular; 5 carb choices (75 gm/meal)  Code Status: Full Code    PT/OT Eval Status: ordered    Dispo- continue IV antibiotics and continue to monitor in the hospital, dc Monday or later once Abx recs final per ID and BG better controlled    Shirley Ramos MD  Internal Medicine Hospitalist

## 2022-05-14 NOTE — PLAN OF CARE
Problem: Discharge Planning  Goal: Discharge to home or other facility with appropriate resources  5/14/2022 0748 by Faraz Kaplan RN  Outcome: Progressing  5/13/2022 2228 by Mahsa Mccallum RN  Outcome: Progressing     Problem: Pain  Goal: Verbalizes/displays adequate comfort level or baseline comfort level  5/14/2022 0748 by Faraz Kaplan RN  Outcome: Grace Reyez  5/13/2022 2228 by Mahsa cMcallum RN  Outcome: Progressing     Problem: Safety - Adult  Goal: Free from fall injury  5/14/2022 0748 by Faraz Kaplan RN  Outcome: Progressing  5/13/2022 2228 by Mahsa Mccallum RN  Outcome: Progressing  Flowsheets (Taken 5/13/2022 2226)  Free From Fall Injury: Instruct family/caregiver on patient safety     Problem: Skin/Tissue Integrity  Goal: Absence of new skin breakdown  Description: 1. Monitor for areas of redness and/or skin breakdown  2. Assess vascular access sites hourly  3. Every 4-6 hours minimum:  Change oxygen saturation probe site  4. Every 4-6 hours:  If on nasal continuous positive airway pressure, respiratory therapy assess nares and determine need for appliance change or resting period.   5/14/2022 0748 by Faraz Kaplan RN  Outcome: Progressing  5/13/2022 2228 by Mahsa Mccallum RN  Outcome: Progressing     Problem: ABCDS Injury Assessment  Goal: Absence of physical injury  5/14/2022 0748 by Faraz Kaplan RN  Outcome: Progressing  5/13/2022 2228 by Mahsa Mccallum RN  Outcome: Progressing  Flowsheets (Taken 5/13/2022 2226)  Absence of Physical Injury: Implement safety measures based on patient assessment     Problem: Chronic Conditions and Co-morbidities  Goal: Patient's chronic conditions and co-morbidity symptoms are monitored and maintained or improved  5/14/2022 0748 by Faraz Kaplan RN  Outcome: Progressing  5/13/2022 2228 by Mahsa Mccallum RN  Outcome: Progressing

## 2022-05-14 NOTE — PLAN OF CARE
Pt pain is currently 6 out of 10. Pt just received morphine, he states it is helping with the pain. Problem: Pain  Goal: Verbalizes/displays adequate comfort level or baseline comfort level  5/13/2022 2228 by Garry Koehler RN  Outcome: Progressing  5/13/2022 1210 by Arden Ramires RN  Outcome: Progressing     Problem: Safety - Adult  Goal: Free from fall injury  5/13/2022 2228 by Garry Koehler RN  Outcome: Progressing  Flowsheets (Taken 5/13/2022 2226)  Free From Fall Injury: Instruct family/caregiver on patient safety  5/13/2022 1210 by Arden Ramires RN  Outcome: Progressing     Problem: Skin/Tissue Integrity  Goal: Absence of new skin breakdown  Description: 1. Monitor for areas of redness and/or skin breakdown  2. Assess vascular access sites hourly  3. Every 4-6 hours minimum:  Change oxygen saturation probe site  4. Every 4-6 hours:  If on nasal continuous positive airway pressure, respiratory therapy assess nares and determine need for appliance change or resting period.   5/13/2022 2228 by Garry Koehler RN  Outcome: Progressing  5/13/2022 1210 by Arden Ramires RN  Outcome: Progressing     Problem: ABCDS Injury Assessment  Goal: Absence of physical injury  5/13/2022 2228 by Garry Koehler RN  Outcome: Progressing  Flowsheets (Taken 5/13/2022 2226)  Absence of Physical Injury: Implement safety measures based on patient assessment  5/13/2022 1210 by Arden Ramires RN  Outcome: Progressing     Problem: Chronic Conditions and Co-morbidities  Goal: Patient's chronic conditions and co-morbidity symptoms are monitored and maintained or improved  5/13/2022 2228 by Garry Koehler RN  Outcome: Progressing  5/13/2022 1210 by Arden Ramires RN  Outcome: Progressing  Flowsheets (Taken 5/13/2022 1205)  Care Plan - Patient's Chronic Conditions and Co-Morbidity Symptoms are Monitored and Maintained or Improved: Monitor and assess patient's chronic conditions and comorbid symptoms for stability, deterioration, or improvement     Problem: Discharge Planning  Goal: Discharge to home or other facility with appropriate resources  5/13/2022 2228 by Arun Garcia RN  Outcome: Progressing  5/13/2022 1210 by Chestine Merlin, RN  Outcome: Progressing  Flowsheets (Taken 5/13/2022 1205)  Discharge to home or other facility with appropriate resources: Identify barriers to discharge with patient and caregiver

## 2022-05-15 LAB
ANION GAP SERPL CALCULATED.3IONS-SCNC: 12 MMOL/L (ref 3–16)
BASOPHILS ABSOLUTE: 0 K/UL (ref 0–0.2)
BASOPHILS RELATIVE PERCENT: 0.5 %
BUN BLDV-MCNC: 5 MG/DL (ref 7–20)
CALCIUM SERPL-MCNC: 8.7 MG/DL (ref 8.3–10.6)
CHLORIDE BLD-SCNC: 99 MMOL/L (ref 99–110)
CO2: 26 MMOL/L (ref 21–32)
CREAT SERPL-MCNC: <0.5 MG/DL (ref 0.9–1.3)
EOSINOPHILS ABSOLUTE: 0.2 K/UL (ref 0–0.6)
EOSINOPHILS RELATIVE PERCENT: 3.8 %
GFR AFRICAN AMERICAN: >60
GFR NON-AFRICAN AMERICAN: >60
GLUCOSE BLD-MCNC: 153 MG/DL (ref 70–99)
GLUCOSE BLD-MCNC: 155 MG/DL (ref 70–99)
GLUCOSE BLD-MCNC: 215 MG/DL (ref 70–99)
GLUCOSE BLD-MCNC: 225 MG/DL (ref 70–99)
GLUCOSE BLD-MCNC: 249 MG/DL (ref 70–99)
HCT VFR BLD CALC: 28.6 % (ref 40.5–52.5)
HEMOGLOBIN: 10 G/DL (ref 13.5–17.5)
LYMPHOCYTES ABSOLUTE: 1.9 K/UL (ref 1–5.1)
LYMPHOCYTES RELATIVE PERCENT: 36.2 %
MCH RBC QN AUTO: 31 PG (ref 26–34)
MCHC RBC AUTO-ENTMCNC: 35 G/DL (ref 31–36)
MCV RBC AUTO: 88.6 FL (ref 80–100)
MONOCYTES ABSOLUTE: 0.4 K/UL (ref 0–1.3)
MONOCYTES RELATIVE PERCENT: 8.3 %
NEUTROPHILS ABSOLUTE: 2.6 K/UL (ref 1.7–7.7)
NEUTROPHILS RELATIVE PERCENT: 51.2 %
PDW BLD-RTO: 12.2 % (ref 12.4–15.4)
PERFORMED ON: ABNORMAL
PLATELET # BLD: 289 K/UL (ref 135–450)
PMV BLD AUTO: 6.8 FL (ref 5–10.5)
POTASSIUM REFLEX MAGNESIUM: 3.7 MMOL/L (ref 3.5–5.1)
RBC # BLD: 3.23 M/UL (ref 4.2–5.9)
SODIUM BLD-SCNC: 137 MMOL/L (ref 136–145)
WBC # BLD: 5.1 K/UL (ref 4–11)

## 2022-05-15 PROCEDURE — 94760 N-INVAS EAR/PLS OXIMETRY 1: CPT

## 2022-05-15 PROCEDURE — 85025 COMPLETE CBC W/AUTO DIFF WBC: CPT

## 2022-05-15 PROCEDURE — 6360000002 HC RX W HCPCS: Performed by: UROLOGY

## 2022-05-15 PROCEDURE — 2580000003 HC RX 258: Performed by: UROLOGY

## 2022-05-15 PROCEDURE — 36415 COLL VENOUS BLD VENIPUNCTURE: CPT

## 2022-05-15 PROCEDURE — 6370000000 HC RX 637 (ALT 250 FOR IP): Performed by: UROLOGY

## 2022-05-15 PROCEDURE — 6370000000 HC RX 637 (ALT 250 FOR IP): Performed by: INTERNAL MEDICINE

## 2022-05-15 PROCEDURE — 2580000003 HC RX 258: Performed by: INTERNAL MEDICINE

## 2022-05-15 PROCEDURE — 80048 BASIC METABOLIC PNL TOTAL CA: CPT

## 2022-05-15 PROCEDURE — 1200000000 HC SEMI PRIVATE

## 2022-05-15 RX ORDER — METFORMIN HYDROCHLORIDE 500 MG/1
500 TABLET, EXTENDED RELEASE ORAL ONCE
Status: COMPLETED | OUTPATIENT
Start: 2022-05-15 | End: 2022-05-15

## 2022-05-15 RX ORDER — INSULIN GLARGINE 100 [IU]/ML
15 INJECTION, SOLUTION SUBCUTANEOUS 2 TIMES DAILY
Status: DISCONTINUED | OUTPATIENT
Start: 2022-05-15 | End: 2022-05-18 | Stop reason: HOSPADM

## 2022-05-15 RX ORDER — OXYCODONE HYDROCHLORIDE 5 MG/1
5 TABLET ORAL EVERY 4 HOURS PRN
Status: DISCONTINUED | OUTPATIENT
Start: 2022-05-15 | End: 2022-05-18 | Stop reason: HOSPADM

## 2022-05-15 RX ORDER — METFORMIN HYDROCHLORIDE 500 MG/1
1000 TABLET, EXTENDED RELEASE ORAL 2 TIMES DAILY WITH MEALS
Status: DISCONTINUED | OUTPATIENT
Start: 2022-05-15 | End: 2022-05-18 | Stop reason: HOSPADM

## 2022-05-15 RX ADMIN — INSULIN LISPRO 1 UNITS: 100 INJECTION, SOLUTION INTRAVENOUS; SUBCUTANEOUS at 21:32

## 2022-05-15 RX ADMIN — INSULIN LISPRO 4 UNITS: 100 INJECTION, SOLUTION INTRAVENOUS; SUBCUTANEOUS at 07:59

## 2022-05-15 RX ADMIN — METFORMIN HYDROCHLORIDE 1000 MG: 500 TABLET, EXTENDED RELEASE ORAL at 16:49

## 2022-05-15 RX ADMIN — SODIUM CHLORIDE: 9 INJECTION, SOLUTION INTRAVENOUS at 16:51

## 2022-05-15 RX ADMIN — INSULIN LISPRO 6 UNITS: 100 INJECTION, SOLUTION INTRAVENOUS; SUBCUTANEOUS at 07:59

## 2022-05-15 RX ADMIN — INSULIN GLARGINE 15 UNITS: 100 INJECTION, SOLUTION SUBCUTANEOUS at 12:07

## 2022-05-15 RX ADMIN — MORPHINE SULFATE 4 MG: 4 INJECTION, SOLUTION INTRAMUSCULAR; INTRAVENOUS at 04:34

## 2022-05-15 RX ADMIN — PIPERACILLIN AND TAZOBACTAM 4500 MG: 4; .5 INJECTION, POWDER, LYOPHILIZED, FOR SOLUTION INTRAVENOUS at 04:37

## 2022-05-15 RX ADMIN — IBUPROFEN 400 MG: 400 TABLET ORAL at 16:49

## 2022-05-15 RX ADMIN — PIPERACILLIN AND TAZOBACTAM 4500 MG: 4; .5 INJECTION, POWDER, LYOPHILIZED, FOR SOLUTION INTRAVENOUS at 22:36

## 2022-05-15 RX ADMIN — OXYCODONE 5 MG: 5 TABLET ORAL at 21:30

## 2022-05-15 RX ADMIN — MORPHINE SULFATE 4 MG: 4 INJECTION, SOLUTION INTRAMUSCULAR; INTRAVENOUS at 00:39

## 2022-05-15 RX ADMIN — IBUPROFEN 400 MG: 400 TABLET ORAL at 07:52

## 2022-05-15 RX ADMIN — INSULIN LISPRO 4 UNITS: 100 INJECTION, SOLUTION INTRAVENOUS; SUBCUTANEOUS at 12:06

## 2022-05-15 RX ADMIN — INSULIN LISPRO 2 UNITS: 100 INJECTION, SOLUTION INTRAVENOUS; SUBCUTANEOUS at 16:53

## 2022-05-15 RX ADMIN — METFORMIN HYDROCHLORIDE 500 MG: 500 TABLET, EXTENDED RELEASE ORAL at 07:52

## 2022-05-15 RX ADMIN — LINEZOLID 600 MG: 600 INJECTION, SOLUTION INTRAVENOUS at 21:29

## 2022-05-15 RX ADMIN — OXYCODONE 5 MG: 5 TABLET ORAL at 16:50

## 2022-05-15 RX ADMIN — PIPERACILLIN AND TAZOBACTAM 4500 MG: 4; .5 INJECTION, POWDER, LYOPHILIZED, FOR SOLUTION INTRAVENOUS at 11:43

## 2022-05-15 RX ADMIN — INSULIN GLARGINE 15 UNITS: 100 INJECTION, SOLUTION SUBCUTANEOUS at 21:31

## 2022-05-15 RX ADMIN — ENOXAPARIN SODIUM 40 MG: 100 INJECTION SUBCUTANEOUS at 07:51

## 2022-05-15 RX ADMIN — LINEZOLID 600 MG: 600 INJECTION, SOLUTION INTRAVENOUS at 09:09

## 2022-05-15 RX ADMIN — METFORMIN HYDROCHLORIDE 500 MG: 500 TABLET, EXTENDED RELEASE ORAL at 12:06

## 2022-05-15 RX ADMIN — INSULIN LISPRO 6 UNITS: 100 INJECTION, SOLUTION INTRAVENOUS; SUBCUTANEOUS at 16:54

## 2022-05-15 RX ADMIN — OXYCODONE 5 MG: 5 TABLET ORAL at 12:05

## 2022-05-15 RX ADMIN — OXYCODONE 5 MG: 5 TABLET ORAL at 07:53

## 2022-05-15 RX ADMIN — INSULIN LISPRO 6 UNITS: 100 INJECTION, SOLUTION INTRAVENOUS; SUBCUTANEOUS at 12:07

## 2022-05-15 ASSESSMENT — PAIN SCALES - GENERAL
PAINLEVEL_OUTOF10: 6
PAINLEVEL_OUTOF10: 8
PAINLEVEL_OUTOF10: 6
PAINLEVEL_OUTOF10: 6
PAINLEVEL_OUTOF10: 7
PAINLEVEL_OUTOF10: 7
PAINLEVEL_OUTOF10: 8
PAINLEVEL_OUTOF10: 8
PAINLEVEL_OUTOF10: 3
PAINLEVEL_OUTOF10: 0

## 2022-05-15 ASSESSMENT — PAIN DESCRIPTION - DESCRIPTORS: DESCRIPTORS: ACHING;DISCOMFORT;DULL;SHARP;SHOOTING;SORE

## 2022-05-15 ASSESSMENT — PAIN DESCRIPTION - ORIENTATION
ORIENTATION: RIGHT

## 2022-05-15 ASSESSMENT — PAIN DESCRIPTION - LOCATION
LOCATION: SCROTUM

## 2022-05-15 ASSESSMENT — PAIN DESCRIPTION - ONSET: ONSET: ON-GOING

## 2022-05-15 ASSESSMENT — PAIN SCALES - WONG BAKER
WONGBAKER_NUMERICALRESPONSE: 0
WONGBAKER_NUMERICALRESPONSE: 0

## 2022-05-15 ASSESSMENT — PAIN DESCRIPTION - FREQUENCY: FREQUENCY: CONTINUOUS

## 2022-05-15 ASSESSMENT — PAIN - FUNCTIONAL ASSESSMENT
PAIN_FUNCTIONAL_ASSESSMENT: PREVENTS OR INTERFERES SOME ACTIVE ACTIVITIES AND ADLS

## 2022-05-15 ASSESSMENT — PAIN DESCRIPTION - PAIN TYPE: TYPE: ACUTE PAIN;SURGICAL PAIN

## 2022-05-15 NOTE — PLAN OF CARE
Pt in bed A&O x4. Pt c/o pain in scrotal area. Drainage noted on dressing. Pt educated on diabetic diet. Pt states understanding about diet. Problem: Pain  Goal: Verbalizes/displays adequate comfort level or baseline comfort level  5/15/2022 0814 by Chito Crane RN  Outcome: Progressing  Note: Pt educated on using pain scale. Pt given PRN pain medication per Dr orders see Darion Perez. Pt agreeable to pain management. Problem: Skin/Tissue Integrity  Goal: Absence of new skin breakdown  Description: 1. Monitor for areas of redness and/or skin breakdown  2. Assess vascular access sites hourly  3. Every 4-6 hours minimum:  Change oxygen saturation probe site  4. Every 4-6 hours:  If on nasal continuous positive airway pressure, respiratory therapy assess nares and determine need for appliance change or resting period. 5/15/2022 0814 by Chito Crane RN  Outcome: Progressing  Note: Skin assessment per shift. Pt educated on turning and repositioning every two hours. Pt agreeable. Pillow support offered.

## 2022-05-15 NOTE — PLAN OF CARE
Problem: Discharge Planning  Goal: Discharge to home or other facility with appropriate resources  Outcome: Progressing   Continuing to work with patient and health care team on discharge plan. Discharge instructions and medication management will be reviewed prior to discharge. Problem: Pain  Goal: Verbalizes/displays adequate comfort level or baseline comfort level  Outcome: Progressing   Pt able to express presence/absence of pain and rate pain appropriately using numerical scale. Pain/discomfort being managed with PRN analgesics per MD orders (see MAR). Pain assessed every shift and after interventions. Problem: Safety - Adult  Goal: Free from fall injury  Outcome: Progressing     Problem: Skin/Tissue Integrity  Goal: Absence of new skin breakdown  Description: 1. Monitor for areas of redness and/or skin breakdown  2. Assess vascular access sites hourly  3. Every 4-6 hours minimum:  Change oxygen saturation probe site  4. Every 4-6 hours:  If on nasal continuous positive airway pressure, respiratory therapy assess nares and determine need for appliance change or resting period. Outcome: Progressing   Dressing to scrotum in place and intact.     Problem: ABCDS Injury Assessment  Goal: Absence of physical injury  Outcome: Progressing     Problem: Chronic Conditions and Co-morbidities  Goal: Patient's chronic conditions and co-morbidity symptoms are monitored and maintained or improved  Outcome: Progressing

## 2022-05-15 NOTE — FLOWSHEET NOTE
Pt slept off and on overnight. C/o scrotal pain and pain analgesics given per orders. Dressing intact to scrotum with visible old drainage. Using urinal without difficulty. Antibiotics infusing per orders with no adverse effects. Uses call light as needed.

## 2022-05-15 NOTE — PROGRESS NOTES
Hospitalist Progress Note      PCP: No primary care provider on file.     Chief Complaint: Groin pain    Brief hospital course  44 y.o. man with hx of gonorrhea, DM uncontrolled last HbA1C > 10 nearly 2 yrs ago and not been on meds due to lack of insurance  - presents with rt groin pain, and being managed for sepsis due to right inguinal abscess and epididymo orchitis with extension into inguinal canal    Date of Admission: 5/6/2022    Subjective:   Doing ok, had pain after drain removal but now better  Says he is trying to eat better    Medications:  Reviewed    Infusion Medications    sodium chloride Stopped (05/13/22 2209)    sodium chloride Stopped (05/13/22 0512)    dextrose      sodium chloride 50 mL/hr at 05/14/22 2044     Scheduled Medications    insulin lispro  0.08 Units/kg SubCUTAneous TID WC    insulin glargine  20 Units SubCUTAneous Nightly    metFORMIN  500 mg Oral BID WC    sodium chloride flush  10 mL IntraVENous 2 times per day    enoxaparin  40 mg SubCUTAneous Daily    piperacillin-tazobactam  4,500 mg IntraVENous Q8H    insulin lispro  0-12 Units SubCUTAneous TID WC    insulin lispro  0-6 Units SubCUTAneous Nightly    linezolid  600 mg IntraVENous Q12H     PRN Meds: oxyCODONE, sodium chloride, morphine, ibuprofen, sodium chloride flush, sodium chloride, potassium chloride **OR** potassium alternative oral replacement **OR** potassium chloride, potassium chloride, magnesium sulfate, promethazine **OR** ondansetron, magnesium hydroxide, acetaminophen **OR** acetaminophen, glucose, dextrose, glucagon (rDNA), dextrose      Intake/Output Summary (Last 24 hours) at 5/15/2022 1014  Last data filed at 5/15/2022 0924  Gross per 24 hour   Intake 1845 ml   Output 1700 ml   Net 145 ml       Physical Exam Performed:    /75   Pulse 58   Temp 97.8 °F (36.6 °C) (Oral)   Resp 16   Ht 5' 9\" (1.753 m)   Wt 180 lb 5.4 oz (81.8 kg)   SpO2 96%   BMI 26.63 kg/m²     General appearance: NAD, on RA, holding complete conversations  HEENT:  Conjunctivae/corneas clear. Neck: Supple, with full range of motion. Respiratory:  Normal respiratory effort. Clear to auscultation, bilaterally without Rales/Wheezes/Rhonchi. Cardiovascular: Regular rate and rhythm with normal S1/S2 without murmurs or rubs  Abdomen: Soft, non-tender, non-distended, normal bowel sounds. LE surgical wounds, gauze present  Musculoskeletal: No cyanosis or edema bilaterally  Neurologic:  without any focal sensory/motor deficits. grossly non-focal.  Psychiatric: Alert and oriented, Normal mood  Peripheral Pulses: +2 palpable, equal bilaterally       Labs:   Recent Labs     05/13/22  0634 05/14/22  0736   WBC 8.3 9.0   HGB 12.8* 10.1*   HCT 38.4* 29.0*    320     Recent Labs     05/13/22  0634 05/14/22  0736    135*   K 3.8 4.0    99   CO2 22 24   BUN 7 8   CREATININE 0.5* <0.5*   CALCIUM 9.3 8.8     No results for input(s): AST, ALT, BILIDIR, BILITOT, ALKPHOS in the last 72 hours. No results for input(s): INR in the last 72 hours. No results for input(s): Les Percy in the last 72 hours. Urinalysis:      Lab Results   Component Value Date    NITRU Negative 05/06/2022    WBCUA 397 04/13/2022    BACTERIA Rare 02/27/2014    RBCUA 20 04/13/2022    BLOODU Negative 05/06/2022    SPECGRAV 1.052 05/06/2022    GLUCOSEU >=1000 05/06/2022       Radiology:  CT ABDOMEN PELVIS W IV CONTRAST Additional Contrast? None   Final Result   Worsening right epididymal orchitis as described above with a small fluid   collection in the pelvis as measured above.                Assessment/Plan:    Active Hospital Problems    Diagnosis     Abdominal pain [R10.9]      Priority: Medium       Assessment  Sepsis present on admission likely due to abscess of spermatic cord/ orchitis s/p inguinal orchiectomy 5/14/22, Cx +ve for Klebsiella pneumoniae     Type 2 diabetes mellitus, medication non adherence, uncontrolled, a1c 12.0    PseudoHyponatremia due to Hyperglycemia    Lactic acidosis secondary to #1     Plan  Continue IV abx, ID following  Zosyn high dose, Zyvox and fluconazole  Needs better control of hyperglycemia - ASKED HIM TO STOP DRINKING JUICES, HE/FAMILY GETTING HIM LEMONADE FROM [employer]  STRICTER DIET WITH 4 CARBS PER MEAL  INCREASE DOSE of metformin to 1000 bid  Increase lantus 15 units bid  Continue prandial insulin 6 units tid  He will likely need insulin on dc with A1c 12.0  Diabetic educator needs to see him  MONITOR FOR HYPOGLYCEMIA  Reviewed urology note -Penrose removed today     Diet: ADULT DIET;  Regular; 4 carb choices (60 gm/meal)  Code Status: Full Code    PT/OT Eval Status: ordered    Dispo- continue IV antibiotics and continue to monitor in the hospital, dc Monday or later once Abx recs final per ID and BG better controlled    Sultana Durham MD  Internal Medicine Hospitalist

## 2022-05-15 NOTE — PROGRESS NOTES
Pt Name: [address] Record Number: [id number]  Date of Birth 1985   Today's Date: 5/15/2022      Subjective:  Continued scrotal pain, maybe slight improvement. Slight improvement in scrotal and penile swelling. Penrose removed today    ROS: Constitutional: No fever    Vitals:  Vitals:    05/15/22 0430 05/15/22 0434 05/15/22 0504 05/15/22 0745   BP: 119/76   121/75   Pulse: 61   58   Resp: 18 18 18 16   Temp: 97.7 °F (36.5 °C)   97.8 °F (36.6 °C)   TempSrc: Oral   Oral   SpO2: 97%   96%   Weight: 180 lb 5.4 oz (81.8 kg)      Height:         I/O last 3 completed shifts:   In: 3600 [P.O.:2045; I.V.:658.9; IV Piggyback:896.1]  Out: 1841 [Urine:3075]    Exam:  General: Awake, oriented, no acute distress  Respiratory: Nonlabored breathing  Abdomen: Soft, non-tender, non-distended, no masses  : penile and scrotal edema, incision ok  Skin: Skin color, texture, turgor normal, no rashes or lesions  Neurologic: no gross deficits    CURRENT MEDICATIONS   Scheduled Meds:   insulin lispro  0.08 Units/kg SubCUTAneous TID WC    insulin glargine  20 Units SubCUTAneous Nightly    metFORMIN  500 mg Oral BID WC    sodium chloride flush  10 mL IntraVENous 2 times per day    enoxaparin  40 mg SubCUTAneous Daily    piperacillin-tazobactam  4,500 mg IntraVENous Q8H    insulin lispro  0-12 Units SubCUTAneous TID WC    insulin lispro  0-6 Units SubCUTAneous Nightly    linezolid  600 mg IntraVENous Q12H     Continuous Infusions:   sodium chloride Stopped (05/13/22 2209)    sodium chloride Stopped (05/13/22 0512)    dextrose      sodium chloride 50 mL/hr at 05/14/22 2044     PRN Meds:.sodium chloride, morphine, ibuprofen, sodium chloride flush, sodium chloride, potassium chloride **OR** potassium alternative oral replacement **OR** potassium chloride, potassium chloride, magnesium sulfate, promethazine **OR** ondansetron, magnesium hydroxide, acetaminophen **OR** acetaminophen, glucose, dextrose, glucagon (rDNA), dextrose, oxyCODONE    LABS     Recent Labs     05/13/22  0634 05/14/22  0736   WBC 8.3 9.0   HGB 12.8* 10.1*   HCT 38.4* 29.0*    320    135*   K 3.8 4.0    99   CO2 22 24   BUN 7 8   CREATININE 0.5* <0.5*   CALCIUM 9.3 8.8     Wound culture growing klebsiella      ASSESSMENT   1. Hospital day # 9  Abscess of spermatic cord/ orchitis s/p inguinal orchiectomy 5/14/22    PLAN   1.  Continue local wound care, antibiotics    Myra Claude, MD 5/15/2022 8:31 AM

## 2022-05-16 LAB
ANION GAP SERPL CALCULATED.3IONS-SCNC: 13 MMOL/L (ref 3–16)
BASOPHILS ABSOLUTE: 0 K/UL (ref 0–0.2)
BASOPHILS RELATIVE PERCENT: 0.3 %
BUN BLDV-MCNC: 5 MG/DL (ref 7–20)
CALCIUM SERPL-MCNC: 9.3 MG/DL (ref 8.3–10.6)
CHLORIDE BLD-SCNC: 99 MMOL/L (ref 99–110)
CO2: 24 MMOL/L (ref 21–32)
CREAT SERPL-MCNC: <0.5 MG/DL (ref 0.9–1.3)
EOSINOPHILS ABSOLUTE: 0.3 K/UL (ref 0–0.6)
EOSINOPHILS RELATIVE PERCENT: 4.7 %
GFR AFRICAN AMERICAN: >60
GFR NON-AFRICAN AMERICAN: >60
GLUCOSE BLD-MCNC: 122 MG/DL (ref 70–99)
GLUCOSE BLD-MCNC: 128 MG/DL (ref 70–99)
GLUCOSE BLD-MCNC: 129 MG/DL (ref 70–99)
GLUCOSE BLD-MCNC: 161 MG/DL (ref 70–99)
GLUCOSE BLD-MCNC: 226 MG/DL (ref 70–99)
HCT VFR BLD CALC: 29.8 % (ref 40.5–52.5)
HEMOGLOBIN: 10.4 G/DL (ref 13.5–17.5)
LYMPHOCYTES ABSOLUTE: 1.8 K/UL (ref 1–5.1)
LYMPHOCYTES RELATIVE PERCENT: 30.9 %
MCH RBC QN AUTO: 30.9 PG (ref 26–34)
MCHC RBC AUTO-ENTMCNC: 35 G/DL (ref 31–36)
MCV RBC AUTO: 88.1 FL (ref 80–100)
MONOCYTES ABSOLUTE: 0.4 K/UL (ref 0–1.3)
MONOCYTES RELATIVE PERCENT: 7.2 %
NEUTROPHILS ABSOLUTE: 3.3 K/UL (ref 1.7–7.7)
NEUTROPHILS RELATIVE PERCENT: 56.9 %
PDW BLD-RTO: 12.6 % (ref 12.4–15.4)
PERFORMED ON: ABNORMAL
PLATELET # BLD: 320 K/UL (ref 135–450)
PMV BLD AUTO: 6.8 FL (ref 5–10.5)
POTASSIUM REFLEX MAGNESIUM: 3.6 MMOL/L (ref 3.5–5.1)
RBC # BLD: 3.38 M/UL (ref 4.2–5.9)
SODIUM BLD-SCNC: 136 MMOL/L (ref 136–145)
WBC # BLD: 5.8 K/UL (ref 4–11)

## 2022-05-16 PROCEDURE — 2580000003 HC RX 258: Performed by: UROLOGY

## 2022-05-16 PROCEDURE — 6360000002 HC RX W HCPCS: Performed by: UROLOGY

## 2022-05-16 PROCEDURE — 6370000000 HC RX 637 (ALT 250 FOR IP): Performed by: UROLOGY

## 2022-05-16 PROCEDURE — 36415 COLL VENOUS BLD VENIPUNCTURE: CPT

## 2022-05-16 PROCEDURE — 94760 N-INVAS EAR/PLS OXIMETRY 1: CPT

## 2022-05-16 PROCEDURE — 1200000000 HC SEMI PRIVATE

## 2022-05-16 PROCEDURE — 85025 COMPLETE CBC W/AUTO DIFF WBC: CPT

## 2022-05-16 PROCEDURE — 6370000000 HC RX 637 (ALT 250 FOR IP): Performed by: INTERNAL MEDICINE

## 2022-05-16 PROCEDURE — 80048 BASIC METABOLIC PNL TOTAL CA: CPT

## 2022-05-16 PROCEDURE — 2580000003 HC RX 258: Performed by: INTERNAL MEDICINE

## 2022-05-16 PROCEDURE — 99233 SBSQ HOSP IP/OBS HIGH 50: CPT | Performed by: INTERNAL MEDICINE

## 2022-05-16 RX ADMIN — OXYCODONE 5 MG: 5 TABLET ORAL at 12:55

## 2022-05-16 RX ADMIN — SODIUM CHLORIDE: 9 INJECTION, SOLUTION INTRAVENOUS at 21:48

## 2022-05-16 RX ADMIN — OXYCODONE 5 MG: 5 TABLET ORAL at 02:44

## 2022-05-16 RX ADMIN — IBUPROFEN 400 MG: 400 TABLET ORAL at 10:11

## 2022-05-16 RX ADMIN — INSULIN LISPRO 1 UNITS: 100 INJECTION, SOLUTION INTRAVENOUS; SUBCUTANEOUS at 21:50

## 2022-05-16 RX ADMIN — OXYCODONE 5 MG: 5 TABLET ORAL at 17:27

## 2022-05-16 RX ADMIN — OXYCODONE 5 MG: 5 TABLET ORAL at 10:11

## 2022-05-16 RX ADMIN — INSULIN LISPRO 2 UNITS: 100 INJECTION, SOLUTION INTRAVENOUS; SUBCUTANEOUS at 12:32

## 2022-05-16 RX ADMIN — SODIUM CHLORIDE, PRESERVATIVE FREE 10 ML: 5 INJECTION INTRAVENOUS at 21:42

## 2022-05-16 RX ADMIN — INSULIN LISPRO 6 UNITS: 100 INJECTION, SOLUTION INTRAVENOUS; SUBCUTANEOUS at 17:21

## 2022-05-16 RX ADMIN — INSULIN LISPRO 6 UNITS: 100 INJECTION, SOLUTION INTRAVENOUS; SUBCUTANEOUS at 12:33

## 2022-05-16 RX ADMIN — INSULIN GLARGINE 15 UNITS: 100 INJECTION, SOLUTION SUBCUTANEOUS at 21:50

## 2022-05-16 RX ADMIN — INSULIN LISPRO 6 UNITS: 100 INJECTION, SOLUTION INTRAVENOUS; SUBCUTANEOUS at 10:12

## 2022-05-16 RX ADMIN — PIPERACILLIN AND TAZOBACTAM 4500 MG: 4; .5 INJECTION, POWDER, LYOPHILIZED, FOR SOLUTION INTRAVENOUS at 21:49

## 2022-05-16 RX ADMIN — ENOXAPARIN SODIUM 40 MG: 100 INJECTION SUBCUTANEOUS at 10:11

## 2022-05-16 RX ADMIN — PIPERACILLIN AND TAZOBACTAM 4500 MG: 4; .5 INJECTION, POWDER, LYOPHILIZED, FOR SOLUTION INTRAVENOUS at 12:48

## 2022-05-16 RX ADMIN — IBUPROFEN 400 MG: 400 TABLET ORAL at 02:44

## 2022-05-16 RX ADMIN — INSULIN GLARGINE 15 UNITS: 100 INJECTION, SOLUTION SUBCUTANEOUS at 10:12

## 2022-05-16 RX ADMIN — METFORMIN HYDROCHLORIDE 1000 MG: 500 TABLET, EXTENDED RELEASE ORAL at 17:21

## 2022-05-16 RX ADMIN — OXYCODONE 5 MG: 5 TABLET ORAL at 21:43

## 2022-05-16 RX ADMIN — SODIUM CHLORIDE: 9 INJECTION, SOLUTION INTRAVENOUS at 21:46

## 2022-05-16 RX ADMIN — IBUPROFEN 400 MG: 400 TABLET ORAL at 21:42

## 2022-05-16 RX ADMIN — METFORMIN HYDROCHLORIDE 1000 MG: 500 TABLET, EXTENDED RELEASE ORAL at 10:11

## 2022-05-16 RX ADMIN — PIPERACILLIN AND TAZOBACTAM 4500 MG: 4; .5 INJECTION, POWDER, LYOPHILIZED, FOR SOLUTION INTRAVENOUS at 04:59

## 2022-05-16 ASSESSMENT — PAIN SCALES - GENERAL
PAINLEVEL_OUTOF10: 7

## 2022-05-16 ASSESSMENT — PAIN SCALES - WONG BAKER
WONGBAKER_NUMERICALRESPONSE: 0
WONGBAKER_NUMERICALRESPONSE: 0

## 2022-05-16 NOTE — PROGRESS NOTES
Infectious Disease Follow up Notes  Admit Date: 5/6/2022  Hospital Day: 11    Antibiotics :   IV Zosyn   IV Fluconazole d/c         CHIEF COMPLAINT:     Sepsis  Fever  Rt groin cellulitis abscess  Epididymo orchitis  DM poor control   S/p Rt orchiectomy on 5/13    Klebsiella infection     Subjective interval History :  44 y.o. man with DM uncontrolled last HbA1C > 10 nearly 2 yrs ago and not been on meds due to lack of insurance was seen in ED on  3/18/22 for Dysuria and UA very abnormal and urine cx with Klebsiella noted was placed on Bactrim back then and had second visit to ED on 4/11/22 with Rt scrotal pain and swelling USG negative was placed on Doxycycline , was seen in ED hereon   4/13/22 was given IM Cetriaxone and added Flagyl and Cipro referred to Urology - was seen by . Urine ccx on that visit 25k of Klebsiella noted. He is now admitted with worsening pain Rt scrotal swelling and redness spreading up the Rt inguinal canal. He has no pain with urination but has severe scrotal pain. CT abd/pelvis on this admit with fluid in the inguinal canal with on going redness and concern for deep infection. 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 He is not insured not sure if he has taken the antibiotics ? ?     Location : Rt groin pain, swelling and redness       Quality : aching, burning        Severity : 10/10    Duration :  4 weeks    Timing : constant   Context :  UTI and DM poor control     Modifying factors : none   Associated signs and symptoms: pain swelling local redness        Interval History : rt groin and scrotal pain s/p surgery pen peter drain removed and packing + local redness improving but pain with movements -OR cx with Klebsiella noted     Past Medical History:    Past Medical History:   Diagnosis Date    Diabetes mellitus ([facility] [address]        Past Surgical History:    Past Surgical History:   Procedure Laterality Date    TESTICLE REMOVAL Right 5/13/2022    RIGHT INGUINAL ORCHIECTOMY performed by Sienna Dietrich MD at [place] [id number]       Current Medications:    Outpatient Medications Marked as Taking for the 5/6/22 encounter [facility] Encounter)   Medication Sig Dispense Refill    ciprofloxacin (CIPRO) 250 MG tablet Take 250 mg by mouth 2 times daily For 14 days      ibuprofen (ADVIL;MOTRIN) 600 MG tablet Take 600 mg by mouth every 6 hours as needed for Pain         Allergies:  Patient has no known allergies. Immunizations : There is no immunization history on file for this patient.     Social History:    Social History     Tobacco Use    Smoking status: Never Smoker    Smokeless tobacco: Never Used   Substance Use Topics    Alcohol use: No    Drug use: No     Social History     Tobacco Use   Smoking Status Never Smoker   Smokeless Tobacco Never Used      Family History : no DVT no COPD       REVIEW OF SYSTEMS:     Constitutional: fevers + , chillS+  night sweats  Eyes:  negative for blurred vision, eye discharge, visual disturbance   HEENT:  negative for hearing loss, ear drainage,nasal congestion  Respiratory:  negative for cough, shortness of breath or hemoptysis   Cardiovascular:  negative for chest pain, palpitations, syncope  Gastrointestinal:  negative for nausea, vomiting, diarrhea, constipation, abdominal pain  Genitourinary:  negative for frequency, dysuria, urinary incontinence, hematuria SCROTAL PAIN + SWELLING+   Hematologic/Lymphatic:  negative for easy bruising, bleeding and lymphadenopathy  Allergic/Immunologic:  negative for recurrent infections, angioedema, anaphylaxis   Endocrine:  negative for weight changes, polyuria, polydipsia and polyphagia  Musculoskeletal:  negative for joint  pain, swelling, decreased range of motion  Integumentary: No rashes, skin lesions  Neurological:  negative for headaches, slurred speech, unilateral weakness  Psychiatric: negative for hallucinations,confusion,agitation.                 PHYSICAL EXAM:      Vitals:    /71   Pulse 55   Temp 97.7 °F (36.5 °C) (Oral)   Resp 16   Ht 5' 9\" (1.753 m)   Wt 177 lb 11.1 oz (80.6 kg)   SpO2 96%   BMI 26.24 kg/m²     General Appearance: alert,in some  acute distress, no pallor, no icterus   Skin: warm and dry, no rash or erythema  Head: normocephalic and atraumatic  Eyes: pupils equal, round, and reactive to light, conjunctivae normal  ENT: tympanic membrane, external ear and ear canal normal bilaterally, nose without deformity, nasal mucosa and turbinates normal without polyps  Neck: supple and non-tender without mass, no thyromegaly  no cervical lymphadenopathy  Pulmonary/Chest: clear to auscultation bilaterally- no wheezes, rales or rhonchi, normal air movement, no respiratory distress  Cardiovascular: normal rate, regular rhythm, normal S1 and S2, no murmurs, rubs, clicks, or gallops, no carotid bruits  Abdomen: soft, non-tender, non-distended, normal bowel sounds, no masses or organomegaly  Extremities: no cyanosis, clubbing or edema  Musculoskeletal: normal range of motion, no joint swelling, deformity or tenderness  Integumentary: No rashes, no abnormal skin lesions, no petechiae  Neurologic: reflexes normal and symmetric, no cranial nerve deficit  Psych:  Orientation, sensorium, mood normal            Lines:IV  Rt groin inguinal area on going swelling local pain redness improving and scrotal packing + and drain removed -        Data Review:    CBC:   Lab Results   Component Value Date    WBC 5.1 05/15/2022    HGB 10.0 (L) 05/15/2022    HCT 28.6 (L) 05/15/2022    MCV 88.6 05/15/2022     05/15/2022     RENAL:   Lab Results   Component Value Date    CREATININE <0.5 (L) 05/15/2022    BUN 5 (L) 05/15/2022     05/15/2022    K 3.7 05/15/2022    CL 99 05/15/2022    CO2 26 05/15/2022     SED RATE:   Lab Results Component Value Date    SEDRATE 84 05/07/2022     CK: No results found for: CKTOTAL  CRP:   Lab Results   Component Value Date    .4 05/07/2022     Hepatic Function Panel:   Lab Results   Component Value Date    ALKPHOS 182 05/06/2022    ALT 11 05/06/2022    AST 14 05/06/2022    PROT 8.0 05/06/2022    BILITOT 1.2 05/06/2022    LABALBU 3.7 05/06/2022     UA:  Lab Results   Component Value Date    COLORU Yellow 05/06/2022    CLARITYU Clear 05/06/2022    GLUCOSEU >=1000 05/06/2022    BILIRUBINUR Negative 05/06/2022    KETUA TRACE 05/06/2022    SPECGRAV 1.052 05/06/2022    BLOODU Negative 05/06/2022    PHUR 6.5 05/06/2022    PROTEINU Negative 05/06/2022    UROBILINOGEN 1.0 05/06/2022    NITRU Negative 05/06/2022    LEUKOCYTESUR Negative 05/06/2022    LABMICR Not Indicated 05/06/2022    URINETYPE NotGiven 05/06/2022      Urine Microscopic:   Lab Results   Component Value Date    BACTERIA Rare 02/27/2014    HYALCAST 1 04/13/2022    WBCUA 397 04/13/2022    RBCUA 20 04/13/2022    EPIU 2 04/13/2022     Urine Reflex to Culture:   Lab Results   Component Value Date    URRFLXCULT Not Indicated 05/06/2022         MICRO: cultures reviewed and updated by me   Blood Culture:          Culture, Anaerobic and Aerobic [[id number]] (Abnormal) Collected: 05/13/22 0824   Order Status: Completed Specimen: Tissue from Scrotum Updated: 05/16/22 0725    Gram Stain Result No Epithelial Cells seen   No WBCs or organisms seen     WOUND/ABSCESS -- Abnormal     Rare growth Mixed skin joan,   No further workup    Abnormal     Anaerobic Culture --    Anaerobic culture further report to follow   No anaerobes isolated so far, Further report to follow     Organism Klebsiella pneumoniae Abnormal     WOUND/ABSCESS --    Rare growth   No further workup   Refer to culture #[id number] for sensitivity results    Narrative:     ORDER#: [id number]                          ORDERED BY: Yariel Rivera   SOURCE: Scrotum right inguinal orchiectmoy COLLECTED:  05/13/22 08:24   ANTIBIOTICS AT [facility]. :                      RECEIVED :  05/13/22 08:50   Culture, Surgical [[id number]] (Abnormal)  Collected: 05/13/22 0759   Order Status: Completed Specimen: Specimen from Scrotum Updated: 05/16/22 0725    Gram Stain Result No Epithelial Cells seen   1+ WBC's (Polymorphonuclear)   1+ RBC's   No organisms seen     Anaerobic Culture --    Anaerobic culture further report to follow   No anaerobes isolated so far, Further report to follow     Organism Klebsiella pneumoniae Abnormal     Culture Surgical Rare growth   Narrative:     ORDER#: [id number]                          ORDERED BY: EMELYN Ahn   SOURCE: Scrotum                            COLLECTED:  05/13/22 07:59   ANTIBIOTICS AT [facility]. :                      RECEIVED :  05/13/22 09:22   Quantiferon, Incubated [[id number]] Collected: 05/11/22 1053   Order Status: Completed Specimen: Blood Updated: 05/14/22 1531    Quantiferon TB Minus NIL Negative    Comment: Interpretive Data: Quantiferon TB Gold Plus   Interferon gamma release is measured for specimens from each of the   four   collection tubes. A qualitative result (Negative, Positive, or   Indeterminate)   is based on interpretation of the four values, NIL, MITOGEN minus NIL   (MITOGEN-NIL), TB1 minus NIL (TB1-NIL), and TB2 minus NIL (TB2-NIL). The NIL   value represents nonspecific reactivity produced by the patient   specimen. The   MITOGEN-NIL value serves as the positive control for the patient   specimen,   demonstrating successful lymphocyte activity. The TB1-NIL tube   specifically   detects CD4+ lymphocyte reactivity, specifically stimulated by the TB1   antigens. The TB2-NIL tube detects both CD4+ and CD8+ lymphocyte   reactivity,   stimulated by TB2 antigens. An overall Negative result does not   completely   rule out TB infection. A false-positive result in the absence of other clinical evidence of TB   infection is not uncommon.  Refer to: Updated Guidelines for Using   Interferon   Gamma Release Assays to Detect Mycobacterium tuberculosis Infection ---   [place], 2010   ([employer]Covestor.ee),   for more information concerning test performance in low-prevalence   populations and use in occupational screening. Quantiferon TB1 Minus NIL 0.01 IU/mL     Quantiferon TB2 Minus NIL 0.01 IU/mL     QuantiFERON Mitogen >10.00 IU/mL     QuantiFERON Nil 0.01 IU/mL     Comment: Performed By: [address], [address] [place] [address]   : Leandro Michaud. MD Brigida       Culture with Smear, Acid Fast Blase Fast [[id number]] Collected: 05/13/22 6109   Order Status: Sent Specimen: Tissue from Scrotum Updated: 05/13/22 1603    AFB Smear No AFB observed by Fluorescent stain   Narrative:     ORDER#: [id number]                          ORDERED BY: Brody Chapman   SOURCE: Scrotum                            COLLECTED:  05/13/22 08:24   ANTIBIOTICS AT [facility]. :                      RECEIVED :  05/13/22 08:55   Culture with Smear, Acid Fast Bacillius [[id number]] Collected: 05/13/22 0759   Order Status: Sent Specimen: Specimen from Scrotum Updated: 05/13/22 1603    AFB Smear No AFB observed by Fluorescent stain   Narrative:     ORDER#: [id number]                          ORDERED BY: EMELYN Tijerina   SOURCE: Scrotum                            COLLECTED:  05/13/22 07:59   ANTIBIOTICS AT [facility]. :                      RECEIVED :  05/13/22 09:18   Culture, Fungus [[id number]] Collected: 05/13/22 0824   Order Status: Sent Specimen: Tissue from Scrotum Updated: 05/13/22 1116    Fungus Stain No Fungal elements seen   Narrative:     ORDER#: [id number]                          ORDERED BY: Brody Chapman   SOURCE: Scrotum                            COLLECTED:  05/13/22 08:24   ANTIBIOTICS AT [facility]. :                      RECEIVED :  05/13/22 08:54   Culture, Fungus [[id number]] Collected: 05/13/22 0759   Order Status: Sent Specimen: Specimen from Scrotum Updated: 05/13/22 1116    Fungus Stain No Fungal elements seen   Narrative:     ORDER#: [id number]                          ORDERED BY: EMELYN King   SOURCE: Scrotum                            COLLECTED:  05/13/22 07:59   ANTIBIOTICS AT [facility] :                      RECEIVED :  05/13/22 09:22   Culture with Smear, Acid Fast Bacillius [[id number]]    Order Status: Canceled Specimen: Scrotum    Culture, Anaerobic and Aerobic [[id number]]    Order Status: Canceled Specimen: Scrotum    Culture, Fungus [[id number]]    Order Status: Canceled Specimen: Scrotum    COVID-19, Rapid [[id number]] Collected: 05/11/22 1009   Order Status: Completed Specimen: Nasopharyngeal Swab Updated: 05/11/22 1058    SARS-CoV-2, NAAT Not Detected    Comment: Rapid NAAT:   Negative results should be treated as presumptive and,   if inconsistent with clinical signs and symptoms or necessary for   patient management, should be tested with an alternative molecular   assay. Negative results do not preclude SARS-CoV-2 infection and   should not be used as the sole basis for patient management decisions. This test has been authorized by the FDA under an Emergency Use   Authorization (EUA) for use by authorized laboratories. Fact sheet for Healthcare Providers:   BuildHer.es   Fact sheet for Patients: BuildHer.es     METHODOLOGY: Isothermal Nucleic Acid Amplification       Quantiferon, Incubated [[id number]]    Order Status: Canceled Specimen: Blood    Culture, Blood 2 [[id number]] Collected: 05/06/22 1858   Order Status: Completed Specimen: Blood Updated: 05/10/22 2215    Culture, Blood 2 No Growth after 4 days of incubation. Narrative:     ORDER#: [id number]                          ORDERED BY: Fabi Tang   SOURCE: Blood                              COLLECTED:  05/06/22 18:58   ANTIBIOTICS AT [facility] :                      RECEIVED :  05/06/22 19:02   If child <=2 yrs old please draw pediatric bottle. ~Blood Culture #2   Culture, Blood 1 [[id number]] Collected: 05/06/22 1858   Order Status: Completed Specimen: Blood Updated: 05/10/22 2215    Blood Culture, Routine No Growth after 4 days of incubation. Narrative:     ORDER#: [id number]                          ORDERED BY: Ivan Castro   SOURCE: Blood                              COLLECTED:  05/06/22 18:58   ANTIBIOTICS AT [facility]. :                      RECEIVED :  05/06/22 19:02   If child <=2 yrs old please draw pediatric bottle. ~Blood Culture 1   Culture, Blood 1 [[id number]] Collected: 05/06/22 1359   Order Status: Completed Specimen: Blood Updated: 05/10/22 1515    Blood Culture, Routine No Growth after 4 days of incubation. Narrative:     ORDER#: [id number]                          ORDERED BY: Pastor Stahl   SOURCE: Blood                              COLLECTED:  05/06/22 13:59   ANTIBIOTICS AT [facility]. :                      RECEIVED :  05/06/22 14:08   If child <=2 yrs old please draw pediatric bottle. ~Blood Culture 1   Culture, Blood 2 [[id number]] Collected: 05/06/22 1359   Order Status: Completed Specimen: Blood Updated: 05/10/22 1515    Culture, Blood 2 No Growth after 4 days of incubation. Narrative:     ORDER#: [id number]                          ORDERED BY: Pastor Stahl   SOURCE: Blood                              COLLECTED:  05/06/22 13:59   ANTIBIOTICS AT [facility]. :                      RECEIVED :  05/06/22 14:08   If child <=2 yrs old please draw pediatric bottle. ~Blood Culture #2   Culture, Urine [[id number]] Collected: 05/06/22 1359   Order Status: Completed Specimen: Urine, clean catch Updated: 05/08/22 0800    Urine Culture, Routine No growth at 18 to 36 hours   Narrative:     ORDER#: [id number]                          ORDERED BY: Ivan Castro   SOURCE: Urine Clean Catch                  COLLECTED:  05/06/22 13:59   ANTIBIOTICS AT [facility]. :                      RECEIVED :  05/06/22 21:54   C.trachomatis N.gonorrhoeae DNA, Urine [[id number]] Collected: 05/06/22 6150   Order Status: Completed Specimen: Urine Updated: 05/07/22 0501    C. trachomatis DNA ,Urine Negative    N. gonorrhoeae DNA, Urine Negative         Lab Results   Component Value Date    BC No Growth after 4 days of incubation. 05/06/2022    BLOODCULT2 No Growth after 4 days of incubation.  05/06/2022     Component 4/13/22 1438   Organism Klebsiella pneumoniae Abnormal     Urine Culture, Routine 25,000 CFU/ml    Resulting Agency [facility]            Susceptibility        Klebsiella pneumoniae (1)     Antibiotic Interpretation Microscan   Method Status     amoxicillin-clavulanate Sensitive <=8/4 mcg/mL BACTERIAL SUSCEPTIBILITY PANEL BY KRISTI       ampicillin Resistant >16 mcg/mL BACTERIAL SUSCEPTIBILITY PANEL BY [facility]       ampicillin-sulbactam Sensitive <=8/4 mcg/mL BACTERIAL SUSCEPTIBILITY PANEL BY [facility]I       ceFAZolin Sensitive <=2 mcg/mL BACTERIAL SUSCEPTIBILITY PANEL BY K[facility]I         NOTE: Cefazolin should only be used for uncomplicated UTI         for E.coli or Klebsiella pneumoniae.           cefepime Sensitive <=2 mcg/mL BACTERIAL SUSCEPTIBILITY PANEL BY [facility]       cefTRIAXone Sensitive <=1 mcg/mL BACTERIAL SUSCEPTIBILITY PANEL BY [facility]I       cefuroxime Sensitive <=4 mcg/mL BACTERIAL SUSCEPTIBILITY PANEL BY [facility]       ciprofloxacin Sensitive <=1 mcg/mL BACTERIAL SUSCEPTIBILITY PANEL BY K[facility]I       ertapenem Sensitive <=0.5 mcg/mL BACTERIAL SUSCEPTIBILITY PANEL BY [facility]I       gentamicin Sensitive <=4 mcg/mL BACTERIAL SUSCEPTIBILITY PANEL BY [facility]I       meropenem Sensitive <=1 mcg/mL BACTERIAL SUSCEPTIBILITY PANEL BY [facility]I       nitrofurantoin Resistant >64 mcg/mL BACTERIAL SUSCEPTIBILITY PANEL BY K[facility]I       piperacillin-tazobactam Sensitive <=16 mcg/mL BACTERIAL SUSCEPTIBILITY PANEL BY K[facility]I       trimethoprim-sulfamethoxazole Sensitive <=2/38 mcg/mL BACTERIAL SUSCEPTIBILITY PANEL BY K[facility]I             Narrative  Performed by: [facility]  ORDER#: [id number]                          ORDERED BY: Yessy Uribe   SOURCE: Urine Clean Catch                  COLLECTED:  04/13/22                Procedure Component Value Units Date/Time   Culture, Blood 2 [[id number]] Collected: 05/06/22 1359   Order Status: Sent Specimen: Blood Updated: 05/06/22 1420   Culture, Blood 1 [[id number]] Collected: 05/06/22 1359   Order Status: Sent Specimen: Blood Updated: 05/06/22 1420       Respiratory Culture:  Lab Results   Component Value Date    LABGRAM  05/13/2022     No Epithelial Cells seen  No WBCs or organisms seen       AFB:  Lab Results   Component Value Date    AFBSMEAR No AFB observed by Fluorescent stain 05/13/2022     Viral Culture:  Lab Results   Component Value Date    COVID19 Not Detected 05/11/2022     Urine Culture:   No results for input(s): Andrés Valdez in the last 72 hours. IMAGING:    CT ABDOMEN PELVIS W IV CONTRAST Additional Contrast? None   Final Result   Worsening right epididymal orchitis as described above with a small fluid   collection in the pelvis as measured above.              CT ABDOMEN PELVIS W IV CONTRAST Additional Contrast? None   Final Result   Worsening right epididymal orchitis as described above with a small fluid   collection in the pelvis as measured above.              USG sCROTUM :  4/13/22     Impression   Findings are most suggestive of right epididymo-orchitis.  Clinical follow-up   to resolution is recommended.       Flow was seen within each testicle, arguing against acute torsion.       3 mm left epididymal cyst or spermatocele.       RECOMMENDATIONS:   Unavailable             All the pertinent images and reports for the current Hospitalization were reviewed by me     Scheduled Meds:   metFORMIN  1,000 mg Oral BID WC    insulin glargine  15 Units SubCUTAneous BID    insulin lispro  0.08 Units/kg SubCUTAneous TID WC    sodium chloride flush  10 mL IntraVENous 2 times per day    enoxaparin  40 mg SubCUTAneous Daily    piperacillin-tazobactam  4,500 mg IntraVENous Q8H    insulin lispro  0-12 Units SubCUTAneous TID WC    insulin lispro  0-6 Units SubCUTAneous Nightly       Continuous Infusions:   sodium chloride Stopped (05/13/22 2209)    sodium chloride Stopped (05/13/22 0512)    dextrose      sodium chloride 50 mL/hr at 05/15/22 1651       PRN Meds:  oxyCODONE, sodium chloride, morphine, ibuprofen, sodium chloride flush, sodium chloride, potassium chloride **OR** potassium alternative oral replacement **OR** potassium chloride, potassium chloride, magnesium sulfate, promethazine **OR** ondansetron, magnesium hydroxide, acetaminophen **OR** acetaminophen, glucose, dextrose, glucagon (rDNA), dextrose      Assessment:     Patient Active Problem List   Diagnosis    Abdominal pain       Sepsis resolving   Fevers resolving   WBC normal from partial treatment   Rt inguinal fluid collection concern for abscess on the CT  Rt epididymo orchitis with extension into inguinal canal  UTI recent with Klebsiella pneumoniae partially treated  DM poor control ( NOT TAKING MEDS > 2 YR due to lack of INSURANCE)  Remote HISTORY of Gonorrhea  CT abd/pelvis with worsening of Orchitis compared to before        UNfortunately has not responded to out patient treatment as expected despite appropriate course of abx makes me wonder if he has not Taken his MEDS  Due to Lack of INSURANCE or due to Uncontrolled DM he is having complications     As this is some what unusual if Klebsiella is the Culprit Organism was sensitive to Bactrim, Cipro  Etc       He has made minimal progress on broad spectrum IV abx and most likely needs surgery will d/w Urology about their plans    Urology following closely is a plan for possible surgical intervention      Check TB QuantiFERON - and He is BCG vaccinated     S/p Rt Orchiectomy and drainage of the inguinal area OR cx  As suspected no with Klebsiella and likely the culprit that was detected on Urine cx from March 18 2022     Labs, Microbiology, Radiology and all the pertinent results from current hospitalization and  care every where were reviewed  by me as a part of the evaluation   Plan:   1. IV Zosyn x 4.5 gm Q 8hrs HIGH dose anticipate to cont IV abx for another 2 days    2. D/c  IV Linezolid   3. D/c   IV Fluconazole   4. Control DM    5. HbA1C very elevated    6. bLOOD CX NGDT  7. HIV -ve, Hepatitis, Gonorrhea -ve, Chlamydia -ve, Syphilis screen  -ve  8. ESR. CRP repeat tomorrow   9. Social service assistance for antibiotic help  10. S/p Orchiectomy Rt    11. OR cx KLEBSIELLA NOTED   12. Home going we can choose oral abx once his DM is controlled  Or WE CAN arrange IV Ertapenem once a day through out pt INF[facility]ION center if he needs IV abx      Discussed with patient/Family and Nursing   Risk of Complications/Morbidity: High      · Illness(es)/ Infection present that pose threat to bodily function. · There is potential for severe exacerbation of infection/side effects of treatment. · Therapy requires intensive monitoring for antimicrobial agent toxicity        Discussed with patient/Family and Nursing staff     Thanks for allowing me to participate in your patient's care and please call me with any questions or concerns.     Stevie Medrano MD  Infectious Disease  [facility]) Physician  Phone: [phone]   Fax : [phone]

## 2022-05-16 NOTE — DISCHARGE INSTR - COC
Continuity of Care Form    Patient Name: Michelle Clark   :  1985  MRN:  [id number]    Admit date:  2022  Discharge date:  2022    Code Status Order: Full Code   Advance Directives:   [facility] Documentation       Date/Time Healthcare Directive Type of Healthcare Directive Copy in [facility] [address] Agent's Name Healthcare Agent's Phone Number    22 0549 No, patient does not have an advance directive for healthcare treatment -- -- -- -- --    22 1028 No, patient does not have an advance directive for healthcare treatment -- -- -- -- --            Admitting Physician:  Clark Lundy MD  PCP: No primary care provider on file. Discharging Nurse: YISSEL Moreira [address] Unit/Room#: P1H-7384/6444-78  Discharging Unit Phone Number: [phone]    Emergency Contact:   Extended Emergency Contact Information  Primary Emergency Contact: [address] Phone: [phone]  Relation: Other    Past Surgical History:  Past Surgical History:   Procedure Laterality Date    TESTICLE REMOVAL Right 2022    RIGHT INGUINAL ORCHIECTOMY performed by Urban Presley MD at [place] [address]       Immunization History: There is no immunization history on file for this patient.     Active Problems:  Patient Active Problem List   Diagnosis Code    Abdominal pain R10.9       Isolation/Infection:   Isolation            No Isolation          Patient Infection Status       None to display            Nurse Assessment:  Last Vital Signs: /71   Pulse 55   Temp 97.7 °F (36.5 °C) (Oral)   Resp 16   Ht 5' 9\" (1.753 m)   Wt 177 lb 11.1 oz (80.6 kg)   SpO2 96%   BMI 26.24 kg/m²     Last documented pain score (0-10 scale): Pain Level: 7  Last Weight:   Wt Readings from Last 1 Encounters:   22 177 lb 11.1 oz (80.6 kg)     Mental Status:  oriented and alert    IV Access:  - midline right upper arm    Nursing Mobility/ADLs:  Walking   Independent  Transfer  Independent  Bathing  Independent  Dressing  Independent  Toileting  Independent  Feeding  Independent  Med Admin  Assisted  Med Delivery   whole    Wound Care Documentation and Therapy:  Incision 05/13/22 Groin Anterior;Proximal;Right (Active)   Dressing Status Old drainage noted;New dressing applied;Clean;Dry; Intact 05/15/22 1945   Dressing Change Due 05/15/22 05/14/22 2043   Incision Cleansed Not Cleansed 05/15/22 1945   Dressing/Treatment Scrotal support;ABD pad;Mesh panties 05/15/22 1945   Closure Other (Comment) 05/15/22 1945   Incision Assessment Bleeding 05/15/22 1945   Drainage Amount Moderate 05/15/22 1945   Drainage Description Serosanguinous 05/15/22 1945   Odor None 05/15/22 1945   Number of days: 3        Elimination:  Continence: Bowel: Yes  Bladder: Yes  Urinary Catheter: None   Colostomy/Ileostomy/Ileal Conduit: No       Date of Last BM: 05/18/2022    Intake/Output Summary (Last 24 hours) at 5/16/2022 0902  Last data filed at 5/16/2022 0427  Gross per 24 hour   Intake 1110 ml   Output 3710 ml   Net -2600 ml     I/O last 3 completed shifts: In: 1110 [P.O.:1110]  Out: 4410 [Urine:4410]    Safety Concerns:     None    Impairments/Disabilities:      None    Nutrition Therapy:  Current Nutrition Therapy:   - Oral Diet:  Carb Control 4 carbs/meal (1800kcals/day)    Routes of Feeding: Oral  Liquids: Thin Liquids  Daily Fluid Restriction: no  Last Modified Barium Swallow with Video (Video Swallowing Test): not done    Treatments at the Time of Hospital Discharge:   Respiratory Treatments:  Oxygen Therapy:  is not on home oxygen therapy.   Ventilator:    - No ventilator support    Rehab Therapies: Nurse  Weight Bearing Status/Restrictions: No weight bearing restrictions  Other Medical Equipment (for information only, NOT a DME order):  N/A  Other Treatments:   Diabetes education and support with focus on new insulin, glucose monitoring, meal planning and hypo and hyperglycemia prevention. (Electronically signed by Nicole Ramon RN on 5/18/2022 at 1:37 PM)       Patient's personal belongings (please select all that are sent with patient):  None    RN SIGNATURE:  Electronically signed by Wilfredo De Los Santos RN on 5/18/22 at 3:10 PM EDT    CASE MANAGEMENT/SOCIAL WORK SECTION    Inpatient Status Date: ***    Readmission Risk Assessment Score:  Readmission Risk              Risk of Unplanned Readmission:  11           Discharging to Facility/ Agency   Name:  [facility] care    Address: [address] [place] [address] 673, [address] [facility]  Phone: [phone]  Fax: [phone]        / signature: Electronically signed by Declan Estrada RN on 5/18/22 at 2:02 PM EDT    PHYSICIAN SECTION    Prognosis: {Prognosis:1291278037}    Condition at Discharge: Nick Ramires Patient Condition:[id number]}    Rehab Potential (if transferring to Rehab): {Prognosis:1049837099}    Recommended Labs or Other Treatments After Discharge:   IV Ceftriaxone x 2 gm X q 24 hr X STOP DATE  X  5/25  Start oral Levofloxacin x 750 mg x q 24 HR X 7 DAYS after IV abx are completed  CBC with dif, BMP, ESR, CRP weekly  Fax results to [phone]  PICC line in place  No ID follow up     [address] [facility]  Phone: [phone]   Fax : [phone]         Physician Certification: I certify the above information and transfer of Hammad Arias  is necessary for the continuing treatment of the diagnosis listed and that he requires Home Care for less 30 days.      Update Admission H&P: No change in H&P    PHYSICIAN SIGNATURE:  Electronically signed by Pallavi Busch MD on 5/18/22 at 11:09 AM EDT

## 2022-05-16 NOTE — CONSULTS
Pt was already given a list of community clinics on admission to establish with until Medicaid is set.     Argelia Washington RN, BSN,   [phone]  Electronically signed by Argelia Washington RN on 5/16/2022 at 8:29 AM

## 2022-05-16 NOTE — PROGRESS NOTES
[facility]  Diabetes Education   Progress Note       NAME:  Kaylah Baekr [address] RECORD NUMBER:  [id number]  AGE: 44 y.o. GENDER: male  : 1985  TODAY'S DATE:  2022    Subjective   Reason for Diabetic Education Evaluation and Assessment: insulin, general diabetes support    Bryan Fernández agrees to meet with me for diabetes education. He stated he had no preference for written materials in [place] or [place] When asked if he would be interested in both, he agreed. Visit Type: evaluation      Theone Citizen is a 44 y.o. male referred by:     [x] Physician  [] Nursing  [] Chart Review   [] Other:     PAST MEDICAL HISTORY        Diagnosis Date    Diabetes mellitus ([facility] [address]        PAST SURGICAL HISTORY    Past Surgical History:   Procedure Laterality Date    TESTICLE REMOVAL Right 2022    RIGHT INGUINAL ORCHIECTOMY performed by Rebeka Saldana MD at [facility]    History reviewed. No pertinent family history.     SOCIAL HISTORY    Social History     Tobacco Use    Smoking status: Never Smoker    Smokeless tobacco: Never Used   Substance Use Topics    Alcohol use: No    Drug use: No       ALLERGIES    No Known Allergies    MEDICATIONS     metFORMIN  1,000 mg Oral BID WC    insulin glargine  15 Units SubCUTAneous BID    insulin lispro  0.08 Units/kg SubCUTAneous TID WC    sodium chloride flush  10 mL IntraVENous 2 times per day    enoxaparin  40 mg SubCUTAneous Daily    piperacillin-tazobactam  4,500 mg IntraVENous Q8H    insulin lispro  0-12 Units SubCUTAneous TID WC    insulin lispro  0-6 Units SubCUTAneous Nightly       Objective        Patient Active Problem List   Diagnosis Code    Abdominal pain R10.9        /71   Pulse 55   Temp 97.7 °F (36.5 °C) (Oral)   Resp 16   Ht 5' 9\" (1.753 m)   Wt 177 lb 11.1 oz (80.6 kg)   SpO2 94%   BMI 26.24 kg/m²     HgBA1c:    Lab Results   Component Value Date    LABA1C 12.0 05/07/2022       Recent Labs     05/15/22  1136 05/15/22  1637 05/15/22  2104 05/16/22  0822   POCGLU 225* 155* 153* 129*       BUN/Creatinine:    Lab Results   Component Value Date    BUN 5 05/16/2022    CREATININE <0.5 05/16/2022       Assessment        Diabetes Management and Education    Does the patient have a Primary Care Physician? No     Does the patient require new medication instruction? Yes  Introduced insulin pens. Introduced basal and prandial insulin concepts. Person responsible for administration of Insulin/Medication:        [x] Self     [] Caregiver       [] Spouse       [] Other Family Member   []  Other    Insulin Instruction:  insulin pen  Injection Site:   [x] location    [x] rotation     Level of patient/caregiver understanding able to:      [] Verbalized Understanding   [x] Demonstrated Understanding       [] Teach Back       [] Needs Reinforcement     []  Other:        Does the patient/caregiver monitor Blood Glucoses? No: Previous experience with glucose monitoring. Not currently testing his blood sugars. Reviewed glycemic control targets, testing frequency and when to call PCP. Level of patient/caregiver understanding able to:        [x] Verbalized Understanding   [] Demonstrated Understanding       [] Teach Back       [] Needs Reinforcement     []  Other:        Does the patient/caregiver follow a Meal Plan? No: Reports switching to drinking only water yesterday. Recommend making water, unsweetened tea or coffee primary drinks. Identified common carbs and potion sizes. Reviewed importance of eating three meals per day and plate method for consistent carb intake. Level of patient/caregiver understanding able to:       [x] Verbalized Understanding   [] Demonstrated Understanding       [x] Teach Back       [] Needs Reinforcement     []  Other:      Does the patient/caregiver understand S/S of Hypoglycemia? No: Works in [employer].     Reviewed symptoms, prevention and treatment. Recommend carrying rescue carbs when at work. Level of patient/caregiver understanding able to:       [] Verbalized Understanding   [] Demonstrated Understanding       [] Teach Back       [x] Needs Reinforcement     []  Other:                    Does the patient/caregiver understand S/S of Hyperglycemia? No: Increased thirst and frequent urination. Reviewed symptoms, prevention and treatment. Level of patient/caregiver understanding able to:        [x] Verbalized Understanding   [] Demonstrated Understanding       [] Teach Back       [] Needs Reinforcement     []  Other:           Plan        Ongoing diabetes education and blood glucose monitoring. Recommend Meds to beds. [facility].       Social Service Consult Made:  Yes    The following educational and support materials were provided:  · My contact information  · BD booklet - Getting Started        · The Diabetes Education Program:  Planning Healthy Meals   · Academy of Nutrition and Dietetics handout - Carbohydrate Counting for People with Diabetes  · Blood Glucose Log Book                                           Discharge Plan:  Discharge needs: insulin pens and 4mm pen needles and glucometer/strips/lancets  Placement for patient upon discharge: independent living        Teaching Time Diabetes Education:  50 minutes     Electronically signed by Lois Tyler on 5/16/2022 at 10:04 AM

## 2022-05-16 NOTE — PROGRESS NOTES
Hospitalist Progress Note      PCP: No primary care provider on file. Date of Admission: 5/6/2022    Chief Complaint: groin pain    Hospital Course: 37m presents with R grroins cellulitis, uncontrolled / DM, medication non compliance. Found to have R epididymal orchitis, UTI. Underwent R orchiectomy 5/13    Subjective: denies uncontrolled pain, fever, chills, difficulty urinating, n/v      Medications:  Reviewed    Infusion Medications    sodium chloride Stopped (05/13/22 2209)    sodium chloride Stopped (05/13/22 0512)    dextrose      sodium chloride 50 mL/hr at 05/15/22 1651     Scheduled Medications    metFORMIN  1,000 mg Oral BID WC    insulin glargine  15 Units SubCUTAneous BID    insulin lispro  0.08 Units/kg SubCUTAneous TID WC    sodium chloride flush  10 mL IntraVENous 2 times per day    enoxaparin  40 mg SubCUTAneous Daily    piperacillin-tazobactam  4,500 mg IntraVENous Q8H    insulin lispro  0-12 Units SubCUTAneous TID WC    insulin lispro  0-6 Units SubCUTAneous Nightly     PRN Meds: oxyCODONE, sodium chloride, morphine, ibuprofen, sodium chloride flush, sodium chloride, potassium chloride **OR** potassium alternative oral replacement **OR** potassium chloride, potassium chloride, magnesium sulfate, promethazine **OR** ondansetron, magnesium hydroxide, acetaminophen **OR** acetaminophen, glucose, dextrose, glucagon (rDNA), dextrose      Intake/Output Summary (Last 24 hours) at 5/16/2022 1048  Last data filed at 5/16/2022 0918  Gross per 24 hour   Intake 1190 ml   Output 3710 ml   Net -2520 ml       Physical Exam Performed:    /71   Pulse 55   Temp 97.7 °F (36.5 °C) (Oral)   Resp 16   Ht 5' 9\" (1.753 m)   Wt 177 lb 11.1 oz (80.6 kg)   SpO2 94%   BMI 26.24 kg/m²     General appearance: No apparent distress, appears stated age and cooperative. HEENT: Pupils equal, round, and reactive to light. Conjunctivae/corneas clear. Neck: Supple, with full range of motion.  No jugular venous distention. Trachea midline. Respiratory:  Normal respiratory effort. No use of accessory muscles  Cardiovascular: Regular rate and rhythm   Abdomen: Soft, non-tender, non-distended    Musculoskeletal: No clubbing, cyanosis or edema bilaterally. No calf tenderness  Skin: Skin color, texture, turgor normal.     Neurologic:   grossly non-focal.  Psychiatric: Alert and oriented, thought content appropriate, normal insight         Labs:   Recent Labs     05/14/22  0736 05/15/22  1058 05/16/22  0817   WBC 9.0 5.1 5.8   HGB 10.1* 10.0* 10.4*   HCT 29.0* 28.6* 29.8*    289 320     Recent Labs     05/14/22  0736 05/15/22  1058 05/16/22  0816   * 137 136   K 4.0 3.7 3.6   CL 99 99 99   CO2 24 26 24   BUN 8 5* 5*   CREATININE <0.5* <0.5* <0.5*   CALCIUM 8.8 8.7 9.3     No results for input(s): AST, ALT, BILIDIR, BILITOT, ALKPHOS in the last 72 hours. No results for input(s): INR in the last 72 hours. No results for input(s): Otf Oiler in the last 72 hours. Urinalysis:      Lab Results   Component Value Date    NITRU Negative 05/06/2022    WBCUA 397 04/13/2022    BACTERIA Rare 02/27/2014    RBCUA 20 04/13/2022    BLOODU Negative 05/06/2022    SPECGRAV 1.052 05/06/2022    GLUCOSEU >=1000 05/06/2022       Radiology:  CT ABDOMEN PELVIS W IV CONTRAST Additional Contrast? None   Final Result   Worsening right epididymal orchitis as described above with a small fluid   collection in the pelvis as measured above. Assessment/Plan:    Active Hospital Problems    Diagnosis Date Noted    Abdominal pain [R10.9] 05/06/2022     Priority: Medium       R epididymo orchitis, cellulitis, abscess  - continued IV abx as per ID  - pain controlled  - failed outpatient therapy    DM  - corrective ISS with lantus    Anemia  - stable    DVT Prophylaxis: lovenox  Diet: ADULT DIET;  Regular; 4 carb choices (60 gm/meal)  Code Status: Full Code         Emmanuel Palacio MD

## 2022-05-16 NOTE — CARE COORDINATION
[facility]    Referral received from  to follow for home care services. I will follow for needs, and speak with patient to verify demos.             Cindy Hernandez RN, BSN CTN  [facility] [phone]

## 2022-05-16 NOTE — CARE COORDINATION
Talked to pt re: dc needs ([id number] [address] [employer] ([employer])). Pt agreeable to IVAB at home. The Plan for Transition of Care is related to the following treatment goals: IVAB at home    The Patient was provided with a choice of provider and agrees with the discharge plan. [x] Yes [] No    Freedom of choice list was provided with basic dialogue that supports the patient's individualized plan of care/goals, treatment preferences and shares the quality data associated with the providers. [x] Yes [] No    Pt chose [address], they are able to accept. Also notified Itzel with [employer] of IVAB need at dc & pt Medicaid pending.     Rich Vera RN, BSN,   [phone]  Electronically signed by Rich Vera RN on 5/16/2022 at 3:35 PM     [place] back from Jani Reyes with [employer], she will talk with pt re: cost/payment for:    $45/day for Zosyn  $36/day for Fluconazole  $62/day for LInezolid    Rich Vera RN, BSN,   [phone]  Electronically signed by Rich Vera RN on 5/16/2022 at 4:13 PM

## 2022-05-16 NOTE — PROGRESS NOTES
Pt Name: Prasanna [facility] Record Number: [id number]  Date of Birth 1985   Today's Date: 5/16/2022      Subjective:  Continued scrotal pain but is improving. Slight improvement in scrotal and penile swelling. ROS: Constitutional: No fever    Vitals:  Vitals:    05/16/22 0105 05/16/22 0427 05/16/22 0429 05/16/22 0914   BP: 116/72  115/71    Pulse: 69  55    Resp: 16  16    Temp: 98 °F (36.7 °C)  97.7 °F (36.5 °C)    TempSrc: Oral  Oral    SpO2: 97%  96% 94%   Weight:  177 lb 11.1 oz (80.6 kg)     Height:         I/O last 3 completed shifts:   In: 1110 [P.O.:1110]  Out: 4410 [Urine:4410]    Exam:  General: Awake, oriented, no acute distress  Respiratory: Nonlabored breathing  Abdomen: Soft, non-tender, non-distended, no masses  : penile and scrotal edema, incision ok  Skin: Skin color, texture, turgor normal, no rashes or lesions  Neurologic: no gross deficits    CURRENT MEDICATIONS   Scheduled Meds:   metFORMIN  1,000 mg Oral BID WC    insulin glargine  15 Units SubCUTAneous BID    insulin lispro  0.08 Units/kg SubCUTAneous TID WC    sodium chloride flush  10 mL IntraVENous 2 times per day    enoxaparin  40 mg SubCUTAneous Daily    piperacillin-tazobactam  4,500 mg IntraVENous Q8H    insulin lispro  0-12 Units SubCUTAneous TID WC    insulin lispro  0-6 Units SubCUTAneous Nightly     Continuous Infusions:   sodium chloride Stopped (05/13/22 2209)    sodium chloride Stopped (05/13/22 0512)    dextrose      sodium chloride 50 mL/hr at 05/15/22 1651     PRN Meds:.oxyCODONE, sodium chloride, morphine, ibuprofen, sodium chloride flush, sodium chloride, potassium chloride **OR** potassium alternative oral replacement **OR** potassium chloride, potassium chloride, magnesium sulfate, promethazine **OR** ondansetron, magnesium hydroxide, acetaminophen **OR** acetaminophen, glucose, dextrose, glucagon (rDNA), dextrose    LABS     Recent Labs     05/14/22  0736 05/15/22  1058   WBC 9.0 5.1 HGB 10.1* 10.0*   HCT 29.0* 28.6*    289   * 137   K 4.0 3.7   CL 99 99   CO2 24 26   BUN 8 5*   CREATININE <0.5* <0.5*   CALCIUM 8.8 8.7     Wound culture growing klebsiella      ASSESSMENT   1. Hospital day # 10   2. Abscess of spermatic cord/ orchitis s/p inguinal orchiectomy 22    PLAN   1. Continue local wound care, antibiotics  2. Follow up with Dr. Tati Mitchell next week.     Jaleesa Damon, ISAAK - CNP 2022 9:26 AM

## 2022-05-16 NOTE — PLAN OF CARE

## 2022-05-17 LAB
ANION GAP SERPL CALCULATED.3IONS-SCNC: 13 MMOL/L (ref 3–16)
BASOPHILS ABSOLUTE: 0 K/UL (ref 0–0.2)
BASOPHILS RELATIVE PERCENT: 0.7 %
BUN BLDV-MCNC: 7 MG/DL (ref 7–20)
C-REACTIVE PROTEIN: 16.8 MG/L (ref 0–5.1)
CALCIUM SERPL-MCNC: 8.8 MG/DL (ref 8.3–10.6)
CHLORIDE BLD-SCNC: 103 MMOL/L (ref 99–110)
CO2: 22 MMOL/L (ref 21–32)
CREAT SERPL-MCNC: <0.5 MG/DL (ref 0.9–1.3)
EOSINOPHILS ABSOLUTE: 0.2 K/UL (ref 0–0.6)
EOSINOPHILS RELATIVE PERCENT: 5.2 %
GFR AFRICAN AMERICAN: >60
GFR NON-AFRICAN AMERICAN: >60
GLUCOSE BLD-MCNC: 145 MG/DL (ref 70–99)
GLUCOSE BLD-MCNC: 153 MG/DL (ref 70–99)
GLUCOSE BLD-MCNC: 162 MG/DL (ref 70–99)
GLUCOSE BLD-MCNC: 187 MG/DL (ref 70–99)
GLUCOSE BLD-MCNC: 231 MG/DL (ref 70–99)
HCT VFR BLD CALC: 29.6 % (ref 40.5–52.5)
HEMOGLOBIN: 10.3 G/DL (ref 13.5–17.5)
LYMPHOCYTES ABSOLUTE: 1.7 K/UL (ref 1–5.1)
LYMPHOCYTES RELATIVE PERCENT: 35.7 %
MCH RBC QN AUTO: 30.9 PG (ref 26–34)
MCHC RBC AUTO-ENTMCNC: 34.7 G/DL (ref 31–36)
MCV RBC AUTO: 89.2 FL (ref 80–100)
MONOCYTES ABSOLUTE: 0.4 K/UL (ref 0–1.3)
MONOCYTES RELATIVE PERCENT: 7.8 %
NEUTROPHILS ABSOLUTE: 2.4 K/UL (ref 1.7–7.7)
NEUTROPHILS RELATIVE PERCENT: 50.6 %
PDW BLD-RTO: 12.1 % (ref 12.4–15.4)
PERFORMED ON: ABNORMAL
PLATELET # BLD: 318 K/UL (ref 135–450)
PMV BLD AUTO: 6.4 FL (ref 5–10.5)
POTASSIUM REFLEX MAGNESIUM: 3.7 MMOL/L (ref 3.5–5.1)
RBC # BLD: 3.32 M/UL (ref 4.2–5.9)
SEDIMENTATION RATE, ERYTHROCYTE: 70 MM/HR (ref 0–15)
SODIUM BLD-SCNC: 138 MMOL/L (ref 136–145)
WBC # BLD: 4.7 K/UL (ref 4–11)

## 2022-05-17 PROCEDURE — 36415 COLL VENOUS BLD VENIPUNCTURE: CPT

## 2022-05-17 PROCEDURE — 2580000003 HC RX 258: Performed by: UROLOGY

## 2022-05-17 PROCEDURE — 86140 C-REACTIVE PROTEIN: CPT

## 2022-05-17 PROCEDURE — 80048 BASIC METABOLIC PNL TOTAL CA: CPT

## 2022-05-17 PROCEDURE — 6360000002 HC RX W HCPCS: Performed by: UROLOGY

## 2022-05-17 PROCEDURE — 85025 COMPLETE CBC W/AUTO DIFF WBC: CPT

## 2022-05-17 PROCEDURE — 85652 RBC SED RATE AUTOMATED: CPT

## 2022-05-17 PROCEDURE — 1200000000 HC SEMI PRIVATE

## 2022-05-17 PROCEDURE — 0051A: CPT | Performed by: HOSPITALIST

## 2022-05-17 PROCEDURE — 6370000000 HC RX 637 (ALT 250 FOR IP): Performed by: UROLOGY

## 2022-05-17 PROCEDURE — 94760 N-INVAS EAR/PLS OXIMETRY 1: CPT

## 2022-05-17 PROCEDURE — 91305: CPT | Performed by: HOSPITALIST

## 2022-05-17 PROCEDURE — 36569 INSJ PICC 5 YR+ W/O IMAGING: CPT

## 2022-05-17 PROCEDURE — 99233 SBSQ HOSP IP/OBS HIGH 50: CPT | Performed by: INTERNAL MEDICINE

## 2022-05-17 PROCEDURE — 91305 HC RX W HCPCS: CPT | Performed by: HOSPITALIST

## 2022-05-17 PROCEDURE — 6370000000 HC RX 637 (ALT 250 FOR IP): Performed by: INTERNAL MEDICINE

## 2022-05-17 PROCEDURE — 6360000002 HC RX W HCPCS: Performed by: HOSPITALIST

## 2022-05-17 PROCEDURE — 76937 US GUIDE VASCULAR ACCESS: CPT

## 2022-05-17 PROCEDURE — C1751 CATH, INF, PER/CENT/MIDLINE: HCPCS

## 2022-05-17 RX ORDER — SODIUM CHLORIDE 0.9 % (FLUSH) 0.9 %
5-40 SYRINGE (ML) INJECTION PRN
Status: DISCONTINUED | OUTPATIENT
Start: 2022-05-17 | End: 2022-05-17 | Stop reason: SDUPTHER

## 2022-05-17 RX ORDER — SODIUM CHLORIDE 9 MG/ML
25 INJECTION, SOLUTION INTRAVENOUS PRN
Status: DISCONTINUED | OUTPATIENT
Start: 2022-05-17 | End: 2022-05-17 | Stop reason: SDUPTHER

## 2022-05-17 RX ORDER — SODIUM CHLORIDE 0.9 % (FLUSH) 0.9 %
5-40 SYRINGE (ML) INJECTION EVERY 12 HOURS SCHEDULED
Status: DISCONTINUED | OUTPATIENT
Start: 2022-05-17 | End: 2022-05-17 | Stop reason: SDUPTHER

## 2022-05-17 RX ORDER — LIDOCAINE HYDROCHLORIDE 10 MG/ML
5 INJECTION, SOLUTION EPIDURAL; INFILTRATION; INTRACAUDAL; PERINEURAL ONCE
Status: DISCONTINUED | OUTPATIENT
Start: 2022-05-17 | End: 2022-05-18 | Stop reason: HOSPADM

## 2022-05-17 RX ADMIN — INSULIN GLARGINE 15 UNITS: 100 INJECTION, SOLUTION SUBCUTANEOUS at 22:57

## 2022-05-17 RX ADMIN — PIPERACILLIN AND TAZOBACTAM 4500 MG: 4; .5 INJECTION, POWDER, LYOPHILIZED, FOR SOLUTION INTRAVENOUS at 13:12

## 2022-05-17 RX ADMIN — INSULIN LISPRO 6 UNITS: 100 INJECTION, SOLUTION INTRAVENOUS; SUBCUTANEOUS at 13:15

## 2022-05-17 RX ADMIN — METFORMIN HYDROCHLORIDE 1000 MG: 500 TABLET, EXTENDED RELEASE ORAL at 16:58

## 2022-05-17 RX ADMIN — INSULIN LISPRO 2 UNITS: 100 INJECTION, SOLUTION INTRAVENOUS; SUBCUTANEOUS at 22:57

## 2022-05-17 RX ADMIN — ENOXAPARIN SODIUM 40 MG: 100 INJECTION SUBCUTANEOUS at 08:44

## 2022-05-17 RX ADMIN — PIPERACILLIN AND TAZOBACTAM 4500 MG: 4; .5 INJECTION, POWDER, LYOPHILIZED, FOR SOLUTION INTRAVENOUS at 21:20

## 2022-05-17 RX ADMIN — OXYCODONE 5 MG: 5 TABLET ORAL at 21:10

## 2022-05-17 RX ADMIN — OXYCODONE 5 MG: 5 TABLET ORAL at 16:58

## 2022-05-17 RX ADMIN — INSULIN LISPRO 2 UNITS: 100 INJECTION, SOLUTION INTRAVENOUS; SUBCUTANEOUS at 13:15

## 2022-05-17 RX ADMIN — SODIUM CHLORIDE, PRESERVATIVE FREE 10 ML: 5 INJECTION INTRAVENOUS at 21:12

## 2022-05-17 RX ADMIN — METFORMIN HYDROCHLORIDE 1000 MG: 500 TABLET, EXTENDED RELEASE ORAL at 08:44

## 2022-05-17 RX ADMIN — BNT162B2 0.3 ML: 0.23 INJECTION, SUSPENSION INTRAMUSCULAR at 15:15

## 2022-05-17 RX ADMIN — IBUPROFEN 400 MG: 400 TABLET ORAL at 16:58

## 2022-05-17 RX ADMIN — IBUPROFEN 400 MG: 400 TABLET ORAL at 04:58

## 2022-05-17 RX ADMIN — OXYCODONE 5 MG: 5 TABLET ORAL at 04:57

## 2022-05-17 RX ADMIN — PIPERACILLIN AND TAZOBACTAM 4500 MG: 4; .5 INJECTION, POWDER, LYOPHILIZED, FOR SOLUTION INTRAVENOUS at 04:58

## 2022-05-17 RX ADMIN — IBUPROFEN 400 MG: 400 TABLET ORAL at 22:59

## 2022-05-17 ASSESSMENT — PAIN SCALES - GENERAL
PAINLEVEL_OUTOF10: 0
PAINLEVEL_OUTOF10: 10
PAINLEVEL_OUTOF10: 0
PAINLEVEL_OUTOF10: 0
PAINLEVEL_OUTOF10: 8
PAINLEVEL_OUTOF10: 5
PAINLEVEL_OUTOF10: 8

## 2022-05-17 ASSESSMENT — PAIN DESCRIPTION - DESCRIPTORS
DESCRIPTORS: BURNING
DESCRIPTORS: ACHING;DISCOMFORT

## 2022-05-17 ASSESSMENT — PAIN SCALES - WONG BAKER: WONGBAKER_NUMERICALRESPONSE: 0

## 2022-05-17 ASSESSMENT — PAIN DESCRIPTION - ORIENTATION
ORIENTATION: RIGHT
ORIENTATION: RIGHT

## 2022-05-17 ASSESSMENT — PAIN DESCRIPTION - LOCATION
LOCATION: SCROTUM
LOCATION: SCROTUM

## 2022-05-17 ASSESSMENT — PAIN DESCRIPTION - ONSET: ONSET: ON-GOING

## 2022-05-17 ASSESSMENT — PAIN DESCRIPTION - PAIN TYPE: TYPE: ACUTE PAIN

## 2022-05-17 ASSESSMENT — PAIN DESCRIPTION - FREQUENCY: FREQUENCY: INTERMITTENT

## 2022-05-17 ASSESSMENT — PAIN - FUNCTIONAL ASSESSMENT: PAIN_FUNCTIONAL_ASSESSMENT: ACTIVITIES ARE NOT PREVENTED

## 2022-05-17 NOTE — PROGRESS NOTES
[facility]  Diabetes Education   Progress Note       NAME:  Kaylah Baker [address] RECORD NUMBER:  [id number]  AGE: 44 y.o. GENDER: male  : 1985  TODAY'S DATE:  2022    Subjective   Reason for Diabetic Education Evaluation and Assessment: new insulin     Lori Dieter reports that he has successfully self administered an insulin injection. He expressed confidence that he will be able to give himself injections at home. Visit Type: follow-up      Chase Langley is a 44 y.o. male referred by:     [x] Physician  [] Nursing  [] Chart Review   [] Other:     PAST MEDICAL HISTORY        Diagnosis Date    Diabetes mellitus ([facility] [address]        PAST SURGICAL HISTORY    Past Surgical History:   Procedure Laterality Date    TESTICLE REMOVAL Right 2022    RIGHT INGUINAL ORCHIECTOMY performed by Mohini Cruz MD at [facility]    History reviewed. No pertinent family history.     SOCIAL HISTORY    Social History     Tobacco Use    Smoking status: Never Smoker    Smokeless tobacco: Never Used   Substance Use Topics    Alcohol use: No    Drug use: No       ALLERGIES    No Known Allergies    MEDICATIONS     lidocaine 1 % injection  5 mL IntraDERmal Once    metFORMIN  1,000 mg Oral BID WC    insulin glargine  15 Units SubCUTAneous BID    insulin lispro  0.08 Units/kg SubCUTAneous TID WC    sodium chloride flush  10 mL IntraVENous 2 times per day    enoxaparin  40 mg SubCUTAneous Daily    piperacillin-tazobactam  4,500 mg IntraVENous Q8H    insulin lispro  0-12 Units SubCUTAneous TID WC    insulin lispro  0-6 Units SubCUTAneous Nightly       Objective        Patient Active Problem List   Diagnosis Code    Abdominal pain R10.9        /74   Pulse 76   Temp 98 °F (36.7 °C) (Oral)   Resp 20   Ht 5' 9\" (1.753 m)   Wt 175 lb 14.8 oz (79.8 kg)   SpO2 98%   BMI 25.98 kg/m²     HgBA1c:    Lab Results   Component Value Date    LABA1C 12.0 2022 Recent Labs     05/16/22  1634 05/16/22  2036 05/17/22  0910 05/17/22  1208   POCGLU 122* 161* 145* 187*       BUN/Creatinine:    Lab Results   Component Value Date    BUN 7 05/17/2022    CREATININE <0.5 05/17/2022       Assessment        Diabetes Management and Education       Does the patient require new medication instruction? Yes  Reviewed insulin pen administration steps. Person responsible for administration of Insulin/Medication:        [x] Self     [] Caregiver       [] Spouse       [] Other Family Member   []  Other     Level of patient/caregiver understanding able to:       [] Verbalized Understanding   [] Demonstrated Understanding       [x] Teach Back       [] Needs Reinforcement     []  Other:        Does the patient/caregiver monitor Blood Glucoses? No: previous experience  Reviewed glycemic control targets, testing frequency and when to call PCP. Level of patient/caregiver understanding able to:        [x] Verbalized Understanding   [] Demonstrated Understanding       [] Teach Back       [] Needs Reinforcement     []  Other:        Does the patient/caregiver follow a Meal Plan? No: Reports that he is getting enough food to eat. Recommend making water, unsweetened tea or coffee primary drinks. Reviewed importance of eating three meals per day . Level of patient/caregiver understanding able to:       [x] Verbalized Understanding   [] Demonstrated Understanding       [] Teach Back       [] Needs Reinforcement     []  Other:            Plan        Ongoing diabetes education and blood glucose monitoring. Recommend meds to beds. Recommend follow up with home care and [facility].                                            Discharge Plan:  Discharge needs: insulin pens and 4mm pen needles and glucometer/strips/lancets       Teaching Time Diabetes Education:  10 minutes     Electronically signed by Jemma Miller on 5/17/2022 at 4:22 PM

## 2022-05-17 NOTE — PLAN OF CARE
Problem: Discharge Planning  Goal: Discharge to home or other facility with appropriate resources  5/16/2022 2356 by Jodee Mcdowell RN  Outcome: Progressing     Problem: Pain  Goal: Verbalizes/displays adequate comfort level or baseline comfort level  5/16/2022 2356 by Jodee Mcdowell RN  Outcome: Progressing     Problem: Safety - Adult  Goal: Free from fall injury  5/16/2022 2356 by Jodee Mcdowell RN  Outcome: Progressing     Problem: Skin/Tissue Integrity  Goal: Absence of new skin breakdown  Description: 1. Monitor for areas of redness and/or skin breakdown  2. Assess vascular access sites hourly  3. Every 4-6 hours minimum:  Change oxygen saturation probe site  4. Every 4-6 hours:  If on nasal continuous positive airway pressure, respiratory therapy assess nares and determine need for appliance change or resting period.   5/16/2022 2356 by Jodee Mcdowell RN  Outcome: Progressing     Problem: ABCDS Injury Assessment  Goal: Absence of physical injury  5/16/2022 2356 by Jodee Mcdowell RN  Outcome: Progressing     Problem: Chronic Conditions and Co-morbidities  Goal: Patient's chronic conditions and co-morbidity symptoms are monitored and maintained or improved  5/16/2022 2356 by Jodee Mcdowell RN  Outcome: Progressing

## 2022-05-17 NOTE — PLAN OF CARE
Problem: Discharge Planning  Goal: Discharge to home or other facility with appropriate resources  5/17/2022 1047 by Jesus Nixon RN  Outcome: Progressing  Flowsheets (Taken 5/17/2022 0845)  Discharge to home or other facility with appropriate resources: Identify barriers to discharge with patient and caregiver     Problem: Pain  Goal: Verbalizes/displays adequate comfort level or baseline comfort level  5/17/2022 1047 by Jesus Nixon RN  Outcome: Progressing  Flowsheets (Taken 5/17/2022 0845)  Verbalizes/displays adequate comfort level or baseline comfort level: Encourage patient to monitor pain and request assistance  Pain /discomfort being managed with PRN analgesics per MD orders. Patient able to express presence and absence of pain and rate pain appropriately using numerical scale. Problem: Safety - Adult  Goal: Free from fall injury  5/17/2022 1047 by Jesus Nixon RN  Outcome: Progressing  Flowsheets (Taken 5/17/2022 0845)  Free From Fall Injury: Instruct family/caregiver on patient safety     Problem: Skin/Tissue Integrity  Goal: Absence of new skin breakdown  Description: 1. Monitor for areas of redness and/or skin breakdown  2. Assess vascular access sites hourly  3. Every 4-6 hours minimum:  Change oxygen saturation probe site  4. Every 4-6 hours:  If on nasal continuous positive airway pressure, respiratory therapy assess nares and determine need for appliance change or resting period.   5/17/2022 1047 by Jesus Nixon RN  Outcome: Progressing     Problem: ABCDS Injury Assessment  Goal: Absence of physical injury  5/17/2022 1047 by Jesus Nixon RN  Outcome: Progressing  Flowsheets (Taken 5/17/2022 0845)  Absence of Physical Injury: Implement safety measures based on patient assessment     Problem: Chronic Conditions and Co-morbidities  Goal: Patient's chronic conditions and co-morbidity symptoms are monitored and maintained or improved  5/17/2022 1047 by Jesus Nixon RN  Outcome: Progressing  Flowsheets (Taken 5/17/2022 2132)  Care Plan - Patient's Chronic Conditions and Co-Morbidity Symptoms are Monitored and Maintained or Improved: Monitor and assess patient's chronic conditions and comorbid symptoms for stability, deterioration, or improvement

## 2022-05-17 NOTE — PROGRESS NOTES
Infectious Disease Follow up Notes  Admit Date: 5/6/2022  Hospital Day: 12    Antibiotics :   IV Zosyn           CHIEF COMPLAINT:     Sepsis  Fever  Rt groin cellulitis abscess  Epididymo orchitis  DM poor control   S/p Rt orchiectomy on 5/13    Klebsiella infection     Subjective interval History :  44 y.o. man with DM uncontrolled last HbA1C > 10 nearly 2 yrs ago and not been on meds due to lack of insurance was seen in ED on  3/18/22 for Dysuria and UA very abnormal and urine cx with Klebsiella noted was placed on Bactrim back then and had second visit to ED on 4/11/22 with Rt scrotal pain and swelling USG negative was placed on Doxycycline , was seen in ED hereon   4/13/22 was given IM Cetriaxone and added Flagyl and Cipro referred to Urology - was seen by . Urine ccx on that visit 25k of Klebsiella noted. He is now admitted with worsening pain Rt scrotal swelling and redness spreading up the Rt inguinal canal. He has no pain with urination but has severe scrotal pain. CT abd/pelvis on this admit with fluid in the inguinal canal with on going redness and concern for deep infection. 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 He is not insured not sure if he has taken the antibiotics ? ?     Location : Rt groin pain, swelling and redness       Quality : aching, burning        Severity : 10/10    Duration :  4 weeks    Timing : constant   Context :  UTI and DM poor control     Modifying factors : none   Associated signs and symptoms: pain swelling local redness        Interval History : rt groin and scrotal pain s/p surgery packing + scant drainage and local pain with ambulation fever trend down and OR cx noted and OPAT d/w pt about PICC line and IV abx      Past Medical History:    Past Medical History:   Diagnosis Date    Diabetes mellitus ([facility] [address]        Past Surgical History:    Past Surgical History:   Procedure Laterality Date    TESTICLE REMOVAL Right 5/13/2022    RIGHT INGUINAL ORCHIECTOMY performed by Sandro Carbone MD at [place] [id number]       Current Medications:    Outpatient Medications Marked as Taking for the 5/6/22 encounter [facility] Encounter)   Medication Sig Dispense Refill    ciprofloxacin (CIPRO) 250 MG tablet Take 250 mg by mouth 2 times daily For 14 days      ibuprofen (ADVIL;MOTRIN) 600 MG tablet Take 600 mg by mouth every 6 hours as needed for Pain         Allergies:  Patient has no known allergies. Immunizations : There is no immunization history on file for this patient.     Social History:    Social History     Tobacco Use    Smoking status: Never Smoker    Smokeless tobacco: Never Used   Substance Use Topics    Alcohol use: No    Drug use: No     Social History     Tobacco Use   Smoking Status Never Smoker   Smokeless Tobacco Never Used      Family History : no DVT no COPD       REVIEW OF SYSTEMS:     Constitutional: fevers + , chillS+  night sweats  Eyes:  negative for blurred vision, eye discharge, visual disturbance   HEENT:  negative for hearing loss, ear drainage,nasal congestion  Respiratory:  negative for cough, shortness of breath or hemoptysis   Cardiovascular:  negative for chest pain, palpitations, syncope  Gastrointestinal:  negative for nausea, vomiting, diarrhea, constipation, abdominal pain  Genitourinary:  negative for frequency, dysuria, urinary incontinence, hematuria SCROTAL PAIN + SWELLING+   Hematologic/Lymphatic:  negative for easy bruising, bleeding and lymphadenopathy  Allergic/Immunologic:  negative for recurrent infections, angioedema, anaphylaxis   Endocrine:  negative for weight changes, polyuria, polydipsia and polyphagia  Musculoskeletal:  negative for joint  pain, swelling, decreased range of motion  Integumentary: No rashes, skin lesions  Neurological:  negative for headaches, slurred speech, unilateral weakness  Psychiatric: negative for hallucinations,confusion,agitation.                 PHYSICAL EXAM:      Vitals:    /74   Pulse 76   Temp 98 °F (36.7 °C) (Oral)   Resp 20   Ht 5' 9\" (1.753 m)   Wt 175 lb 14.8 oz (79.8 kg)   SpO2 98%   BMI 25.98 kg/m²     General Appearance: alert,in some  acute distress, no pallor, no icterus   Skin: warm and dry, no rash or erythema  Head: normocephalic and atraumatic  Eyes: pupils equal, round, and reactive to light, conjunctivae normal  ENT: tympanic membrane, external ear and ear canal normal bilaterally, nose without deformity, nasal mucosa and turbinates normal without polyps  Neck: supple and non-tender without mass, no thyromegaly  no cervical lymphadenopathy  Pulmonary/Chest: clear to auscultation bilaterally- no wheezes, rales or rhonchi, normal air movement, no respiratory distress  Cardiovascular: normal rate, regular rhythm, normal S1 and S2, no murmurs, rubs, clicks, or gallops, no carotid bruits  Abdomen: soft, non-tender, non-distended, normal bowel sounds, no masses or organomegaly  Extremities: no cyanosis, clubbing or edema  Musculoskeletal: normal range of motion, no joint swelling, deformity or tenderness  Integumentary: No rashes, no abnormal skin lesions, no petechiae  Neurologic: reflexes normal and symmetric, no cranial nerve deficit  Psych:  Orientation, sensorium, mood normal            Lines:IV  Rt groin inguinal area on going swelling local pain redness improving and scrotal packing + and drain removed -        Data Review:    CBC:   Lab Results   Component Value Date    WBC 4.7 05/17/2022    HGB 10.3 (L) 05/17/2022    HCT 29.6 (L) 05/17/2022    MCV 89.2 05/17/2022     05/17/2022     RENAL:   Lab Results   Component Value Date    CREATININE <0.5 (L) 05/17/2022    BUN 7 05/17/2022     05/17/2022    K 3.7 05/17/2022     05/17/2022    CO2 22 05/17/2022     SED RATE:   Lab Results Component Value Date    SEDRATE 70 05/17/2022     CK: No results found for: CKTOTAL  CRP:   Lab Results   Component Value Date    CRP 16.8 05/17/2022     Hepatic Function Panel:   Lab Results   Component Value Date    ALKPHOS 182 05/06/2022    ALT 11 05/06/2022    AST 14 05/06/2022    PROT 8.0 05/06/2022    BILITOT 1.2 05/06/2022    LABALBU 3.7 05/06/2022     UA:  Lab Results   Component Value Date    COLORU Yellow 05/06/2022    CLARITYU Clear 05/06/2022    GLUCOSEU >=1000 05/06/2022    BILIRUBINUR Negative 05/06/2022    KETUA TRACE 05/06/2022    SPECGRAV 1.052 05/06/2022    BLOODU Negative 05/06/2022    PHUR 6.5 05/06/2022    PROTEINU Negative 05/06/2022    UROBILINOGEN 1.0 05/06/2022    NITRU Negative 05/06/2022    LEUKOCYTESUR Negative 05/06/2022    LABMICR Not Indicated 05/06/2022    URINETYPE NotGiven 05/06/2022      Urine Microscopic:   Lab Results   Component Value Date    BACTERIA Rare 02/27/2014    HYALCAST 1 04/13/2022    WBCUA 397 04/13/2022    RBCUA 20 04/13/2022    EPIU 2 04/13/2022     Urine Reflex to Culture:   Lab Results   Component Value Date    URRFLXCULT Not Indicated 05/06/2022         MICRO: cultures reviewed and updated by me   Blood Culture:          Culture, Anaerobic and Aerobic [[id number]] (Abnormal) Collected: 05/13/22 0824   Order Status: Completed Specimen: Tissue from Scrotum Updated: 05/16/22 0725    Gram Stain Result No Epithelial Cells seen   No WBCs or organisms seen     WOUND/ABSCESS -- Abnormal     Rare growth Mixed skin joan,   No further workup    Abnormal     Anaerobic Culture --    Anaerobic culture further report to follow   No anaerobes isolated so far, Further report to follow     Organism Klebsiella pneumoniae Abnormal     WOUND/ABSCESS --    Rare growth   No further workup   Refer to culture #[id number] for sensitivity results    Narrative:     ORDER#: [id number]                          ORDERED BY: Rajan Delgado   SOURCE: Scrotum right inguinal orchiectmoy COLLECTED:  05/13/22 08:24   ANTIBIOTICS AT [facility]. :                      RECEIVED :  05/13/22 08:50   Culture, Surgical [[id number]] (Abnormal)  Collected: 05/13/22 0759   Order Status: Completed Specimen: Specimen from Scrotum Updated: 05/16/22 0725    Gram Stain Result No Epithelial Cells seen   1+ WBC's (Polymorphonuclear)   1+ RBC's   No organisms seen     Anaerobic Culture --    Anaerobic culture further report to follow   No anaerobes isolated so far, Further report to follow     Organism Klebsiella pneumoniae Abnormal     Culture Surgical Rare growth   Narrative:     ORDER#: [id number]                          ORDERED BY: EMELYN Johnson   SOURCE: Scrotum                            COLLECTED:  05/13/22 07:59   ANTIBIOTICS AT [facility]. :                      RECEIVED :  05/13/22 09:22   Quantiferon, Incubated [[id number]] Collected: 05/11/22 1053   Order Status: Completed Specimen: Blood Updated: 05/14/22 1531    Quantiferon TB Minus NIL Negative    Comment: Interpretive Data: Quantiferon TB Gold Plus   Interferon gamma release is measured for specimens from each of the   four   collection tubes. A qualitative result (Negative, Positive, or   Indeterminate)   is based on interpretation of the four values, NIL, MITOGEN minus NIL   (MITOGEN-NIL), TB1 minus NIL (TB1-NIL), and TB2 minus NIL (TB2-NIL). The NIL   value represents nonspecific reactivity produced by the patient   specimen. The   MITOGEN-NIL value serves as the positive control for the patient   specimen,   demonstrating successful lymphocyte activity. The TB1-NIL tube   specifically   detects CD4+ lymphocyte reactivity, specifically stimulated by the TB1   antigens. The TB2-NIL tube detects both CD4+ and CD8+ lymphocyte   reactivity,   stimulated by TB2 antigens. An overall Negative result does not   completely   rule out TB infection. A false-positive result in the absence of other clinical evidence of TB   infection is not uncommon.  Refer to: Updated Guidelines for Using   Interferon   Gamma Release Assays to Detect Mycobacterium tuberculosis Infection ---   [place], 2010   ([employer].shenzhoufu.ee),   for more information concerning test performance in low-prevalence   populations and use in occupational screening. Quantiferon TB1 Minus NIL 0.01 IU/mL     Quantiferon TB2 Minus NIL 0.01 IU/mL     QuantiFERON Mitogen >10.00 IU/mL     QuantiFERON Nil 0.01 IU/mL     Comment: Performed By: [address]   [address] [place] [address], [address] [facility]   : Tiffany Valdez. Karlee Wilson MD       Culture with Smear, Acid Fast Leigh Ann Schilling [[id number]] Collected: 05/13/22 8502   Order Status: Sent Specimen: Tissue from Scrotum Updated: 05/13/22 1603    AFB Smear No AFB observed by Fluorescent stain   Narrative:     ORDER#: [id number]                          ORDERED BY: Chrissy Scrape   SOURCE: Scrotum                            COLLECTED:  05/13/22 08:24   ANTIBIOTICS AT [facility]. :                      RECEIVED :  05/13/22 08:55   Culture with Smear, Acid Fast Bacillius [[id number]] Collected: 05/13/22 0759   Order Status: Sent Specimen: Specimen from Scrotum Updated: 05/13/22 1603    AFB Smear No AFB observed by Fluorescent stain   Narrative:     ORDER#: [id number]                          ORDERED BY: EMELYN Cantrell   SOURCE: Scrotum                            COLLECTED:  05/13/22 07:59   ANTIBIOTICS AT [facility]. :                      RECEIVED :  05/13/22 09:18   Culture, Fungus [[id number]] Collected: 05/13/22 0824   Order Status: Sent Specimen: Tissue from Scrotum Updated: 05/13/22 1116    Fungus Stain No Fungal elements seen   Narrative:     ORDER#: [id number]                          ORDERED BY: Chrissy Scrape   SOURCE: Scrotum                            COLLECTED:  05/13/22 08:24   ANTIBIOTICS AT [facility]. :                      RECEIVED :  05/13/22 08:54   Culture, Fungus [[id number]] Collected: 05/13/22 0759   Order Status: Sent Specimen: Specimen from Scrotum Updated: 05/13/22 1116    Fungus Stain No Fungal elements seen   Narrative:     ORDER#: [id number]                          ORDERED BY: EMELYN Knox   SOURCE: Scrotum                            COLLECTED:  05/13/22 07:59   ANTIBIOTICS AT [facility]. :                      RECEIVED :  05/13/22 09:22   Culture with Smear, Acid Fast Bacillius [[id number]]    Order Status: Canceled Specimen: Scrotum    Culture, Anaerobic and Aerobic [[id number]]    Order Status: Canceled Specimen: Scrotum    Culture, Fungus [[id number]]    Order Status: Canceled Specimen: Scrotum    COVID-19, Rapid [[id number]] Collected: 05/11/22 1009   Order Status: Completed Specimen: Nasopharyngeal Swab Updated: 05/11/22 1058    SARS-CoV-2, NAAT Not Detected    Comment: Rapid NAAT:   Negative results should be treated as presumptive and,   if inconsistent with clinical signs and symptoms or necessary for   patient management, should be tested with an alternative molecular   assay. Negative results do not preclude SARS-CoV-2 infection and   should not be used as the sole basis for patient management decisions. This test has been authorized by the FDA under an Emergency Use   Authorization (EUA) for use by authorized laboratories. Fact sheet for Healthcare Providers:   BuildHer.es   Fact sheet for Patients: BuildHer.es     METHODOLOGY: Isothermal Nucleic Acid Amplification       Quantiferon, Incubated [[id number]]    Order Status: Canceled Specimen: Blood    Culture, Blood 2 [[id number]] Collected: 05/06/22 1858   Order Status: Completed Specimen: Blood Updated: 05/10/22 2215    Culture, Blood 2 No Growth after 4 days of incubation. Narrative:     ORDER#: [id number]                          ORDERED BY: [facility]   SOURCE: Blood                              COLLECTED:  05/06/22 18:58   ANTIBIOTICS AT [facility]. :                      RECEIVED :  05/06/22 19:02   If child <=2 yrs old please draw pediatric bottle. ~Blood Culture #2   Culture, Blood 1 [[id number]] Collected: 05/06/22 1858   Order Status: Completed Specimen: Blood Updated: 05/10/22 2215    Blood Culture, Routine No Growth after 4 days of incubation. Narrative:     ORDER#: [id number]                          ORDERED BY: Ivan Castro   SOURCE: Blood                              COLLECTED:  05/06/22 18:58   ANTIBIOTICS AT [facility]. :                      RECEIVED :  05/06/22 19:02   If child <=2 yrs old please draw pediatric bottle. ~Blood Culture 1   Culture, Blood 1 [[id number]] Collected: 05/06/22 1359   Order Status: Completed Specimen: Blood Updated: 05/10/22 1515    Blood Culture, Routine No Growth after 4 days of incubation. Narrative:     ORDER#: [id number]                          ORDERED BY: Pastor Stahl   SOURCE: Blood                              COLLECTED:  05/06/22 13:59   ANTIBIOTICS AT [facility]. :                      RECEIVED :  05/06/22 14:08   If child <=2 yrs old please draw pediatric bottle. ~Blood Culture 1   Culture, Blood 2 [[id number]] Collected: 05/06/22 1359   Order Status: Completed Specimen: Blood Updated: 05/10/22 1515    Culture, Blood 2 No Growth after 4 days of incubation. Narrative:     ORDER#: [id number]                          ORDERED BY: Pastor Stahl   SOURCE: Blood                              COLLECTED:  05/06/22 13:59   ANTIBIOTICS AT [facility]. :                      RECEIVED :  05/06/22 14:08   If child <=2 yrs old please draw pediatric bottle. ~Blood Culture #2   Culture, Urine [[id number]] Collected: 05/06/22 1359   Order Status: Completed Specimen: Urine, clean catch Updated: 05/08/22 0800    Urine Culture, Routine No growth at 18 to 36 hours   Narrative:     ORDER#: [id number]                          ORDERED BY: Ivan Castro   SOURCE: Urine Clean Catch                  COLLECTED:  05/06/22 13:59   ANTIBIOTICS AT [facility]. :                      RECEIVED :  05/06/22 21:54   C.trachomatis N.gonorrhoeae DNA, Urine [[id number]] Collected: 05/06/22 3217   Order Status: Completed Specimen: Urine Updated: 05/07/22 0501    C. trachomatis DNA ,Urine Negative    N. gonorrhoeae DNA, Urine Negative         Lab Results   Component Value Date    BC No Growth after 4 days of incubation. 05/06/2022    BLOODCULT2 No Growth after 4 days of incubation.  05/06/2022     Component 4/13/22 1438   Organism Klebsiella pneumoniae Abnormal     Urine Culture, Routine 25,000 CFU/ml    Resulting Agency [facility]            Susceptibility        Klebsiella pneumoniae (1)     Antibiotic Interpretation Microscan   Method Status     amoxicillin-clavulanate Sensitive <=8/4 mcg/mL BACTERIAL SUSCEPTIBILITY PANEL BY KRISTI       ampicillin Resistant >16 mcg/mL BACTERIAL SUSCEPTIBILITY PANEL BY K[facility]I       ampicillin-sulbactam Sensitive <=8/4 mcg/mL BACTERIAL SUSCEPTIBILITY PANEL BY KRISTI       ceFAZolin Sensitive <=2 mcg/mL BACTERIAL SUSCEPTIBILITY PANEL BY KRISTI         NOTE: Cefazolin should only be used for uncomplicated UTI         for E.coli or Klebsiella pneumoniae.           cefepime Sensitive <=2 mcg/mL BACTERIAL SUSCEPTIBILITY PANEL BY KRISTI       cefTRIAXone Sensitive <=1 mcg/mL BACTERIAL SUSCEPTIBILITY PANEL BY KRI[facility]I       cefuroxime Sensitive <=4 mcg/mL BACTERIAL SUSCEPTIBILITY PANEL BY KRISTI       ciprofloxacin Sensitive <=1 mcg/mL BACTERIAL SUSCEPTIBILITY PANEL BY KRISTI       ertapenem Sensitive <=0.5 mcg/mL BACTERIAL SUSCEPTIBILITY PANEL BY KRISTI       gentamicin Sensitive <=4 mcg/mL BACTERIAL SUSCEPTIBILITY PANEL BY KRISTI       meropenem Sensitive <=1 mcg/mL BACTERIAL SUSCEPTIBILITY PANEL BY KRISTI       nitrofurantoin Resistant >64 mcg/mL BACTERIAL SUSCEPTIBILITY PANEL BY KRISTI       piperacillin-tazobactam Sensitive <=16 mcg/mL BACTERIAL SUSCEPTIBILITY PANEL BY KRISTI       trimethoprim-sulfamethoxazole Sensitive <=2/38 mcg/mL BACTERIAL SUSCEPTIBILITY PANEL BY KRISTI             Narrative  Performed by: [facility]  ORDER#: [id number]                          ORDERED BY: Ruddy Contreras   SOURCE: Urine Clean Catch                  COLLECTED:  04/13/22                Procedure Component Value Units Date/Time   Culture, Blood 2 [[id number]] Collected: 05/06/22 1359   Order Status: Sent Specimen: Blood Updated: 05/06/22 1420   Culture, Blood 1 [[id number]] Collected: 05/06/22 1359   Order Status: Sent Specimen: Blood Updated: 05/06/22 1420       Respiratory Culture:  Lab Results   Component Value Date    LABGRAM  05/13/2022     No Epithelial Cells seen  No WBCs or organisms seen       AFB:  Lab Results   Component Value Date    AFBSMEAR No AFB observed by Fluorescent stain 05/13/2022     Viral Culture:  Lab Results   Component Value Date    COVID19 Not Detected 05/11/2022     Urine Culture:   No results for input(s): Dorthula Cobia in the last 72 hours. IMAGING:    CT ABDOMEN PELVIS W IV CONTRAST Additional Contrast? None   Final Result   Worsening right epididymal orchitis as described above with a small fluid   collection in the pelvis as measured above.              CT ABDOMEN PELVIS W IV CONTRAST Additional Contrast? None   Final Result   Worsening right epididymal orchitis as described above with a small fluid   collection in the pelvis as measured above.              USG sCROTUM :  4/13/22     Impression   Findings are most suggestive of right epididymo-orchitis.  Clinical follow-up   to resolution is recommended.       Flow was seen within each testicle, arguing against acute torsion.       3 mm left epididymal cyst or spermatocele.       RECOMMENDATIONS:   Unavailable             All the pertinent images and reports for the current Hospitalization were reviewed by me     Scheduled Meds:   metFORMIN  1,000 mg Oral BID WC    insulin glargine  15 Units SubCUTAneous BID    insulin lispro  0.08 Units/kg SubCUTAneous TID WC    sodium chloride flush  10 mL IntraVENous 2 times per day    enoxaparin  40 mg SubCUTAneous Daily    piperacillin-tazobactam  4,500 mg IntraVENous Q8H    insulin lispro  0-12 Units SubCUTAneous TID WC    insulin lispro  0-6 Units SubCUTAneous Nightly       Continuous Infusions:   sodium chloride Stopped (05/17/22 0247)    sodium chloride Stopped (05/13/22 0512)    dextrose      sodium chloride 50 mL/hr at 05/17/22 0638       PRN Meds:  oxyCODONE, sodium chloride, morphine, ibuprofen, sodium chloride flush, sodium chloride, potassium chloride **OR** potassium alternative oral replacement **OR** potassium chloride, potassium chloride, magnesium sulfate, promethazine **OR** ondansetron, magnesium hydroxide, acetaminophen **OR** acetaminophen, glucose, dextrose, glucagon (rDNA), dextrose      Assessment:     Patient Active Problem List   Diagnosis    Abdominal pain       Sepsis resolving   Fevers resolving   WBC normal from partial treatment   Rt inguinal fluid collection concern for abscess on the CT  Rt epididymo orchitis with extension into inguinal canal  UTI recent with Klebsiella pneumoniae partially treated  DM poor control ( NOT TAKING MEDS > 2 YR due to lack of INSURANCE)  Remote HISTORY of Gonorrhea  CT abd/pelvis with worsening of Orchitis compared to before        UNfortunately has not responded to out patient treatment as expected despite appropriate course of abx makes me wonder if he has not Taken his MEDS  Due to Lack of INSURANCE or due to Uncontrolled DM he is having complications     As this is some what unusual if Klebsiella is the Culprit Organism was sensitive to Bactrim, Cipro  Etc       He has made minimal progress on broad spectrum IV abx and most likely needs surgery will d/w Urology about their plans    Urology following closely is a plan for possible surgical intervention      Check TB QuantiFERON - and He is BCG vaccinated     S/p Rt Orchiectomy and drainage of the inguinal area OR cx  As suspected no with Klebsiella and likely the culprit that was detected on Urine cx from March 18 2022     Labs, Microbiology, Radiology and all the pertinent results from current hospitalization and  care every where were reviewed  by me as a part of the evaluation   Plan:   1.  change to IV Ceftriaxone from tomorrow and  x 2 gm x q 24 hr x 7 days at d/c  3. Will change to oral abx after the IV abx as out patient   4. Control DM    5. HbA1C very elevated    6. bLOOD CX NGDT  7. HIV -ve, Hepatitis, Gonorrhea -ve, Chlamydia -ve, Syphilis screen  -ve  8. ESR. CRP trend down   9. Social service assistance for antibiotic help  10. S/p Orchiectomy Rt    11. OR cx KLEBSIELLA NOTED   12. PICC  13. KENNEDY done      Discussed with patient/Family and Nursing  d/w     Risk of Complications/Morbidity: High      · Illness(es)/ Infection present that pose threat to bodily function. · There is potential for severe exacerbation of infection/side effects of treatment. · Therapy requires intensive monitoring for antimicrobial agent toxicity        Discussed with patient/Family and Nursing staff     Thanks for allowing me to participate in your patient's care and please call me with any questions or concerns.     Drew Walker MD  Infectious Disease  [facility] ([facility]) Physician  Phone: [phone]   Fax : [phone]

## 2022-05-17 NOTE — PROCEDURES
DIT VASCULAR ACCESS SERVICES:    MIDLINE PLACEMENT:  Preprocedure & timeout done w primary RN [facility]; +Midline order verified; pt has no arm restrictions; no difficulty accessing R Basilic vein; a 64LT midline catheter is placed and advanced wo difficulty or complications; +line is patent w brisk blood return and flushes wo resistance; reported same and ok to use Midline to primary RN; pt tolerated procedure very well; he is instructed to remain in bed at rest for 30 min post procedure in order to promote hemostasis to the insertion site and he agrees to do so; pt is left in a position of comfort w siderails up x2, call light in reach and bed is locked in lowest position    NURSES:  *please replace all existing IV tubing with new IV tubing prior to connecting current infusions to the new Midline. *please refrain from obtaining BPs in the RIGHT arm. All of the above may be sources of infection or damage to the Midline and/or insertion site.     RADHA Sutton RN, BSN, [facility].

## 2022-05-17 NOTE — PROGRESS NOTES
Patient is alert and oriented x4, UAL, call light within reach. AM meds complete, patient tolerated well. VSS and WDL. No s/s of distress, no further needs noted at this time.    Electronically signed by Nikolay Childers RN on 5/17/2022 at 10:48 AM

## 2022-05-17 NOTE — PROGRESS NOTES
Patient alert and oriented X4. Patient states  Patient tolerated nightly medications well. Patient verbalizes understanding of education. Patient vital signs recorded. Fall precautions in place. Bed in lowest position, side rails X2. Bed locks on. Bed alarm on. Non slip socks on. Needed items within reach. Call light within reach.  Electronically signed by Rand Leyva RN on 5/16/2022 at 9:59 PM

## 2022-05-17 NOTE — PROGRESS NOTES
Pt Name: [address] [place] [address] Record Number: [id number]  Date of Birth 1985   Today's Date: 5/17/2022      Subjective:  Patient sleeping, did not wake. ROS: Constitutional: No fever    Vitals:  Vitals:    05/17/22 0457 05/17/22 0519 05/17/22 0647 05/17/22 0845   BP:  126/72  120/74   Pulse:  73  76   Resp: 18 21  20   Temp:  95.3 °F (35.2 °C)  98 °F (36.7 °C)   TempSrc:  Oral  Oral   SpO2:  97%  98%   Weight:   175 lb 14.8 oz (79.8 kg)    Height:         I/O last 3 completed shifts:   In: 5359.3 [P.O.:360; I.V.:2955; IV Piggyback:2044.3]  Out: 3200 [Urine:3200]    Exam:  General: Asleep, no acute distress  Respiratory: Nonlabored breathing  Skin: Skin color, texture, turgor normal, no rashes or lesions  Neurologic: no gross deficits    CURRENT MEDICATIONS   Scheduled Meds:   metFORMIN  1,000 mg Oral BID WC    insulin glargine  15 Units SubCUTAneous BID    insulin lispro  0.08 Units/kg SubCUTAneous TID WC    sodium chloride flush  10 mL IntraVENous 2 times per day    enoxaparin  40 mg SubCUTAneous Daily    piperacillin-tazobactam  4,500 mg IntraVENous Q8H    insulin lispro  0-12 Units SubCUTAneous TID WC    insulin lispro  0-6 Units SubCUTAneous Nightly     Continuous Infusions:   sodium chloride Stopped (05/17/22 0247)    sodium chloride Stopped (05/13/22 0512)    dextrose      sodium chloride 50 mL/hr at 05/17/22 0638     PRN Meds:.oxyCODONE, sodium chloride, morphine, ibuprofen, sodium chloride flush, sodium chloride, potassium chloride **OR** potassium alternative oral replacement **OR** potassium chloride, potassium chloride, magnesium sulfate, promethazine **OR** ondansetron, magnesium hydroxide, acetaminophen **OR** acetaminophen, glucose, dextrose, glucagon (rDNA), dextrose    LABS     Recent Labs     05/15/22  1058 05/16/22  0816 05/16/22  0817 05/17/22  0629   WBC 5.1  --  5.8 4.7   HGB 10.0*  --  10.4* 10.3*   HCT 28.6*  --  29.8* 29.6*     --  320 318    136 --  138   K 3.7 3.6  --  3.7   CL 99 99  --  103   CO2 26 24  --  22   BUN 5* 5*  --  7   CREATININE <0.5* <0.5*  --  <0.5*   CALCIUM 8.7 9.3  --  8.8     Wound culture growing klebsiella      ASSESSMENT   1. Hospital day # 11   2. Abscess of spermatic cord/ orchitis s/p inguinal orchiectomy 5/14/22    PLAN   1. Continue local wound care, antibiotics. Home health being arranged for IV antibiotics. 2. Follow up with Dr. Zack Cardoza next week.     Will sign off, call with questions    ISAAK Douglass CNP 5/17/2022 9:36 AM

## 2022-05-17 NOTE — CARE COORDINATION
Per conversation with Dr. Xi Sheets, pt will dc on Invanz 1 gm daily for 7 days. Called Itzel with [facility] & updated her on medication. Itzel called back with costs: $70/day. Verified with pt that [facility] can accept & will follow-up day after dc for education on how to do medication at home; pt verbalized understanding.     Lynn Gonzalez RN, BSN,   [phone]  Electronically signed by Lynn Gonzalez RN on 5/17/2022 at 3:52 PM

## 2022-05-17 NOTE — PROGRESS NOTES
Hospitalist Progress Note      PCP: No primary care provider on file. Date of Admission: 5/6/2022    Chief Complaint: groin pain    Hospital Course: 37m presents with R grroins cellulitis, uncontrolled / DM, medication non compliance. Found to have R epididymal orchitis, UTI. Underwent R orchiectomy 5/13    Subjective: denies uncontrolled pain, fever, chills, difficulty urinating, n/v      Medications:  Reviewed    Infusion Medications    sodium chloride Stopped (05/17/22 0247)    sodium chloride Stopped (05/13/22 0512)    dextrose      sodium chloride 50 mL/hr at 05/17/22 0961     Scheduled Medications    lidocaine 1 % injection  5 mL IntraDERmal Once    metFORMIN  1,000 mg Oral BID WC    insulin glargine  15 Units SubCUTAneous BID    insulin lispro  0.08 Units/kg SubCUTAneous TID WC    sodium chloride flush  10 mL IntraVENous 2 times per day    enoxaparin  40 mg SubCUTAneous Daily    piperacillin-tazobactam  4,500 mg IntraVENous Q8H    insulin lispro  0-12 Units SubCUTAneous TID WC    insulin lispro  0-6 Units SubCUTAneous Nightly     PRN Meds: oxyCODONE, sodium chloride, morphine, ibuprofen, sodium chloride flush, sodium chloride, potassium chloride **OR** potassium alternative oral replacement **OR** potassium chloride, potassium chloride, magnesium sulfate, promethazine **OR** ondansetron, magnesium hydroxide, acetaminophen **OR** acetaminophen, glucose, dextrose, glucagon (rDNA), dextrose      Intake/Output Summary (Last 24 hours) at 5/17/2022 1818  Last data filed at 5/17/2022 1208  Gross per 24 hour   Intake 5319.33 ml   Output 2000 ml   Net 3319.33 ml       Physical Exam Performed:    /74   Pulse 76   Temp 98 °F (36.7 °C) (Oral)   Resp 20   Ht 5' 9\" (1.753 m)   Wt 175 lb 14.8 oz (79.8 kg)   SpO2 98%   BMI 25.98 kg/m²     General appearance: No apparent distress, appears stated age and cooperative. HEENT: Pupils equal, round, and reactive to light.  Conjunctivae/corneas clear.  Neck: Supple, with full range of motion. No jugular venous distention. Trachea midline. Respiratory:  Normal respiratory effort. No use of accessory muscles  Cardiovascular: Regular rate and rhythm   Abdomen: Soft, non-tender, non-distended    Musculoskeletal: No clubbing, cyanosis or edema bilaterally. No calf tenderness  Skin: Skin color, texture, turgor normal.     Neurologic:   grossly non-focal.  Psychiatric: Alert and oriented, thought content appropriate, normal insight         Labs:   Recent Labs     05/15/22  1058 05/16/22  0817 05/17/22  0629   WBC 5.1 5.8 4.7   HGB 10.0* 10.4* 10.3*   HCT 28.6* 29.8* 29.6*    320 318     Recent Labs     05/15/22  1058 05/16/22  0816 05/17/22  0629    136 138   K 3.7 3.6 3.7   CL 99 99 103   CO2 26 24 22   BUN 5* 5* 7   CREATININE <0.5* <0.5* <0.5*   CALCIUM 8.7 9.3 8.8     No results for input(s): AST, ALT, BILIDIR, BILITOT, ALKPHOS in the last 72 hours. No results for input(s): INR in the last 72 hours. No results for input(s): Last Edgar in the last 72 hours. Urinalysis:      Lab Results   Component Value Date    NITRU Negative 05/06/2022    WBCUA 397 04/13/2022    BACTERIA Rare 02/27/2014    RBCUA 20 04/13/2022    BLOODU Negative 05/06/2022    SPECGRAV 1.052 05/06/2022    GLUCOSEU >=1000 05/06/2022       Radiology:  CT ABDOMEN PELVIS W IV CONTRAST Additional Contrast? None   Final Result   Worsening right epididymal orchitis as described above with a small fluid   collection in the pelvis as measured above. Assessment/Plan:    Active Hospital Problems    Diagnosis Date Noted    Abdominal pain [R10.9] 05/06/2022     Priority: Medium       R epididymo orchitis, cellulitis, abscess  - continued IV abx as per ID  - pain controlled  - failed outpatient therapy    DM  - corrective ISS with lantus    Anemia  - stable    DVT Prophylaxis: lovenox  Diet: ADULT DIET;  Regular; 4 carb choices (60 gm/meal)  Code Status: Full Shin Zapata MD

## 2022-05-18 VITALS
HEART RATE: 64 BPM | DIASTOLIC BLOOD PRESSURE: 71 MMHG | OXYGEN SATURATION: 98 % | TEMPERATURE: 97.9 F | HEIGHT: 69 IN | RESPIRATION RATE: 16 BRPM | SYSTOLIC BLOOD PRESSURE: 109 MMHG | BODY MASS INDEX: 25.34 KG/M2 | WEIGHT: 171.08 LBS

## 2022-05-18 LAB
ANAEROBIC CULTURE: ABNORMAL
ANAEROBIC CULTURE: ABNORMAL
ANION GAP SERPL CALCULATED.3IONS-SCNC: 10 MMOL/L (ref 3–16)
BASOPHILS ABSOLUTE: 0 K/UL (ref 0–0.2)
BASOPHILS RELATIVE PERCENT: 0.4 %
BUN BLDV-MCNC: 6 MG/DL (ref 7–20)
CALCIUM SERPL-MCNC: 9.2 MG/DL (ref 8.3–10.6)
CHLORIDE BLD-SCNC: 100 MMOL/L (ref 99–110)
CO2: 24 MMOL/L (ref 21–32)
CREAT SERPL-MCNC: <0.5 MG/DL (ref 0.9–1.3)
CULTURE SURGICAL: ABNORMAL
EOSINOPHILS ABSOLUTE: 0.2 K/UL (ref 0–0.6)
EOSINOPHILS RELATIVE PERCENT: 3.6 %
GFR AFRICAN AMERICAN: >60
GFR NON-AFRICAN AMERICAN: >60
GLUCOSE BLD-MCNC: 199 MG/DL (ref 70–99)
GLUCOSE BLD-MCNC: 202 MG/DL (ref 70–99)
GLUCOSE BLD-MCNC: 283 MG/DL (ref 70–99)
GRAM STAIN RESULT: ABNORMAL
GRAM STAIN RESULT: ABNORMAL
HCT VFR BLD CALC: 30.9 % (ref 40.5–52.5)
HEMOGLOBIN: 10.4 G/DL (ref 13.5–17.5)
LYMPHOCYTES ABSOLUTE: 1.8 K/UL (ref 1–5.1)
LYMPHOCYTES RELATIVE PERCENT: 33.3 %
MCH RBC QN AUTO: 30 PG (ref 26–34)
MCHC RBC AUTO-ENTMCNC: 33.7 G/DL (ref 31–36)
MCV RBC AUTO: 89 FL (ref 80–100)
MONOCYTES ABSOLUTE: 0.4 K/UL (ref 0–1.3)
MONOCYTES RELATIVE PERCENT: 8 %
NEUTROPHILS ABSOLUTE: 3 K/UL (ref 1.7–7.7)
NEUTROPHILS RELATIVE PERCENT: 54.7 %
ORGANISM: ABNORMAL
ORGANISM: ABNORMAL
PDW BLD-RTO: 12.2 % (ref 12.4–15.4)
PERFORMED ON: ABNORMAL
PERFORMED ON: ABNORMAL
PLATELET # BLD: 324 K/UL (ref 135–450)
PMV BLD AUTO: 6.6 FL (ref 5–10.5)
POTASSIUM REFLEX MAGNESIUM: 3.6 MMOL/L (ref 3.5–5.1)
RBC # BLD: 3.47 M/UL (ref 4.2–5.9)
SODIUM BLD-SCNC: 134 MMOL/L (ref 136–145)
WBC # BLD: 5.4 K/UL (ref 4–11)
WOUND/ABSCESS: ABNORMAL
WOUND/ABSCESS: ABNORMAL

## 2022-05-18 PROCEDURE — 2580000003 HC RX 258: Performed by: INTERNAL MEDICINE

## 2022-05-18 PROCEDURE — 99232 SBSQ HOSP IP/OBS MODERATE 35: CPT | Performed by: INTERNAL MEDICINE

## 2022-05-18 PROCEDURE — 80048 BASIC METABOLIC PNL TOTAL CA: CPT

## 2022-05-18 PROCEDURE — 85025 COMPLETE CBC W/AUTO DIFF WBC: CPT

## 2022-05-18 PROCEDURE — 36415 COLL VENOUS BLD VENIPUNCTURE: CPT

## 2022-05-18 PROCEDURE — 2580000003 HC RX 258: Performed by: UROLOGY

## 2022-05-18 PROCEDURE — 6370000000 HC RX 637 (ALT 250 FOR IP): Performed by: INTERNAL MEDICINE

## 2022-05-18 PROCEDURE — 6370000000 HC RX 637 (ALT 250 FOR IP): Performed by: UROLOGY

## 2022-05-18 PROCEDURE — 6360000002 HC RX W HCPCS: Performed by: UROLOGY

## 2022-05-18 PROCEDURE — 6360000002 HC RX W HCPCS: Performed by: INTERNAL MEDICINE

## 2022-05-18 PROCEDURE — 94760 N-INVAS EAR/PLS OXIMETRY 1: CPT

## 2022-05-18 RX ORDER — INSULIN GLARGINE 100 [IU]/ML
30 INJECTION, SOLUTION SUBCUTANEOUS NIGHTLY
Qty: 5 PEN | Refills: 0 | Status: SHIPPED | OUTPATIENT
Start: 2022-05-18

## 2022-05-18 RX ORDER — BLOOD-GLUCOSE METER
1 KIT MISCELLANEOUS DAILY
Qty: 1 KIT | Refills: 0 | Status: SHIPPED | OUTPATIENT
Start: 2022-05-18

## 2022-05-18 RX ORDER — LANCETS 30 GAUGE
1 EACH MISCELLANEOUS 2 TIMES DAILY
Qty: 100 EACH | Refills: 0 | Status: SHIPPED | OUTPATIENT
Start: 2022-05-18

## 2022-05-18 RX ORDER — OXYCODONE HYDROCHLORIDE 5 MG/1
5 TABLET ORAL EVERY 4 HOURS PRN
Qty: 30 TABLET | Refills: 0 | Status: SHIPPED | OUTPATIENT
Start: 2022-05-18 | End: 2022-05-23

## 2022-05-18 RX ORDER — INSULIN GLARGINE 100 [IU]/ML
10 INJECTION, SOLUTION SUBCUTANEOUS NIGHTLY
Qty: 5 PEN | Refills: 0 | Status: SHIPPED | OUTPATIENT
Start: 2022-05-18 | End: 2022-05-18 | Stop reason: SDUPTHER

## 2022-05-18 RX ORDER — METFORMIN HYDROCHLORIDE 500 MG/1
1000 TABLET, EXTENDED RELEASE ORAL 2 TIMES DAILY WITH MEALS
Qty: 30 TABLET | Refills: 3 | Status: SHIPPED | OUTPATIENT
Start: 2022-05-18 | End: 2022-05-26 | Stop reason: SDUPTHER

## 2022-05-18 RX ORDER — CALCIUM CITRATE/VITAMIN D3 200MG-6.25
1 TABLET ORAL DAILY
Qty: 100 EACH | Refills: 3 | Status: SHIPPED | OUTPATIENT
Start: 2022-05-18

## 2022-05-18 RX ADMIN — INSULIN GLARGINE 15 UNITS: 100 INJECTION, SOLUTION SUBCUTANEOUS at 10:11

## 2022-05-18 RX ADMIN — INSULIN LISPRO 6 UNITS: 100 INJECTION, SOLUTION INTRAVENOUS; SUBCUTANEOUS at 10:11

## 2022-05-18 RX ADMIN — SODIUM CHLORIDE, PRESERVATIVE FREE 10 ML: 5 INJECTION INTRAVENOUS at 10:12

## 2022-05-18 RX ADMIN — INSULIN LISPRO 6 UNITS: 100 INJECTION, SOLUTION INTRAVENOUS; SUBCUTANEOUS at 12:41

## 2022-05-18 RX ADMIN — PIPERACILLIN AND TAZOBACTAM 4500 MG: 4; .5 INJECTION, POWDER, LYOPHILIZED, FOR SOLUTION INTRAVENOUS at 05:26

## 2022-05-18 RX ADMIN — ENOXAPARIN SODIUM 40 MG: 100 INJECTION SUBCUTANEOUS at 10:12

## 2022-05-18 RX ADMIN — INSULIN LISPRO 4 UNITS: 100 INJECTION, SOLUTION INTRAVENOUS; SUBCUTANEOUS at 10:12

## 2022-05-18 RX ADMIN — PIPERACILLIN AND TAZOBACTAM 4500 MG: 4; .5 INJECTION, POWDER, LYOPHILIZED, FOR SOLUTION INTRAVENOUS at 12:57

## 2022-05-18 RX ADMIN — MORPHINE SULFATE 4 MG: 4 INJECTION, SOLUTION INTRAMUSCULAR; INTRAVENOUS at 12:55

## 2022-05-18 RX ADMIN — CEFTRIAXONE 2000 MG: 2 INJECTION, POWDER, FOR SOLUTION INTRAMUSCULAR; INTRAVENOUS at 13:58

## 2022-05-18 RX ADMIN — OXYCODONE 5 MG: 5 TABLET ORAL at 05:26

## 2022-05-18 RX ADMIN — INSULIN LISPRO 1 UNITS: 100 INJECTION, SOLUTION INTRAVENOUS; SUBCUTANEOUS at 12:41

## 2022-05-18 RX ADMIN — METFORMIN HYDROCHLORIDE 1000 MG: 500 TABLET, EXTENDED RELEASE ORAL at 10:11

## 2022-05-18 ASSESSMENT — PAIN SCALES - GENERAL
PAINLEVEL_OUTOF10: 8
PAINLEVEL_OUTOF10: 8
PAINLEVEL_OUTOF10: 0

## 2022-05-18 NOTE — PROGRESS NOTES
Infectious Disease Follow up Notes  Admit Date: 5/6/2022  Hospital Day: 13    Antibiotics :   IV  Ceftriaxone     CHIEF COMPLAINT:     Sepsis  Fever  Rt groin cellulitis abscess  Epididymo orchitis  DM poor control   S/p Rt orchiectomy on 5/13    Klebsiella infection     Subjective interval History :  44 y.o. man with DM uncontrolled last HbA1C > 10 nearly 2 yrs ago and not been on meds due to lack of insurance was seen in ED on  3/18/22 for Dysuria and UA very abnormal and urine cx with Klebsiella noted was placed on Bactrim back then and had second visit to ED on 4/11/22 with Rt scrotal pain and swelling USG negative was placed on Doxycycline , was seen in ED hereon   4/13/22 was given IM Cetriaxone and added Flagyl and Cipro referred to Urology - was seen by . Urine ccx on that visit 25k of Klebsiella noted. He is now admitted with worsening pain Rt scrotal swelling and redness spreading up the Rt inguinal canal. He has no pain with urination but has severe scrotal pain. CT abd/pelvis on this admit with fluid in the inguinal canal with on going redness and concern for deep infection. 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 He is not insured not sure if he has taken the antibiotics ? ?     Location : Rt groin pain, swelling and redness       Quality : aching, burning        Severity : 10/10    Duration :  4 weeks    Timing : constant   Context :  UTI and DM poor control     Modifying factors : none   Associated signs and symptoms: pain swelling local redness        Interval History : rt groin and scrotal pain improving and s/p PICC for IV abx OPAT d/w pt and he will start oral abx after IV abx are completed d/w         Past Medical History:    Past Medical History:   Diagnosis Date    Diabetes mellitus ([facility] [address] Past Surgical History:    Past Surgical History:   Procedure Laterality Date    TESTICLE REMOVAL Right 5/13/2022    RIGHT INGUINAL ORCHIECTOMY performed by Kade Lance MD at [facility] [place] [address]       Current Medications:    Outpatient Medications Marked as Taking for the 5/6/22 encounter [facility] Encounter)   Medication Sig Dispense Refill    oxyCODONE (ROXICODONE) 5 MG immediate release tablet Take 1 tablet by mouth every 4 hours as needed for Pain for up to 5 days. 30 tablet 0    metFORMIN (GLUCOPHAGE-XR) 500 MG extended release tablet Take 2 tablets by mouth 2 times daily (with meals) 30 tablet 3    glucose monitoring (FREESTYLE FREEDOM) kit 1 kit by Does not apply route daily 1 kit 0    insulin glargine (LANTUS SOLOSTAR) 100 UNIT/ML injection pen Inject 10 Units into the skin nightly 5 pen 0    ibuprofen (ADVIL;MOTRIN) 600 MG tablet Take 600 mg by mouth every 6 hours as needed for Pain         Allergies:  Patient has no known allergies.     Immunizations :   Immunization History   Administered Date(s) Administered    COVID-19, Food52 top, DO NOT Dilute, Mario-Sucrose, 12+ yrs, PF, 30 mcg/0.3 mL dose 05/17/2022       Social History:    Social History     Tobacco Use    Smoking status: Never Smoker    Smokeless tobacco: Never Used   Substance Use Topics    Alcohol use: No    Drug use: No     Social History     Tobacco Use   Smoking Status Never Smoker   Smokeless Tobacco Never Used      Family History : no DVT no COPD       REVIEW OF SYSTEMS:     Constitutional: fevers + , chillS+  night sweats  Eyes:  negative for blurred vision, eye discharge, visual disturbance   HEENT:  negative for hearing loss, ear drainage,nasal congestion  Respiratory:  negative for cough, shortness of breath or hemoptysis   Cardiovascular:  negative for chest pain, palpitations, syncope  Gastrointestinal:  negative for nausea, vomiting, diarrhea, constipation, abdominal pain  Genitourinary:  negative for frequency, dysuria, urinary incontinence, hematuria SCROTAL PAIN + SWELLING+   Hematologic/Lymphatic:  negative for easy bruising, bleeding and lymphadenopathy  Allergic/Immunologic:  negative for recurrent infections, angioedema, anaphylaxis   Endocrine:  negative for weight changes, polyuria, polydipsia and polyphagia  Musculoskeletal:  negative for joint  pain, swelling, decreased range of motion  Integumentary: No rashes, skin lesions  Neurological:  negative for headaches, slurred speech, unilateral weakness  Psychiatric: negative for hallucinations,confusion,agitation.                 PHYSICAL EXAM:      Vitals:    /71   Pulse 64   Temp 97.9 °F (36.6 °C) (Oral)   Resp 16   Ht 5' 9\" (1.753 m)   Wt 171 lb 1.2 oz (77.6 kg)   SpO2 98%   BMI 25.26 kg/m²     General Appearance: alert,in some  acute distress, no pallor, no icterus   Skin: warm and dry, no rash or erythema  Head: normocephalic and atraumatic  Eyes: pupils equal, round, and reactive to light, conjunctivae normal  ENT: tympanic membrane, external ear and ear canal normal bilaterally, nose without deformity, nasal mucosa and turbinates normal without polyps  Neck: supple and non-tender without mass, no thyromegaly  no cervical lymphadenopathy  Pulmonary/Chest: clear to auscultation bilaterally- no wheezes, rales or rhonchi, normal air movement, no respiratory distress  Cardiovascular: normal rate, regular rhythm, normal S1 and S2, no murmurs, rubs, clicks, or gallops, no carotid bruits  Abdomen: soft, non-tender, non-distended, normal bowel sounds, no masses or organomegaly  Extremities: no cyanosis, clubbing or edema  Musculoskeletal: normal range of motion, no joint swelling, deformity or tenderness  Integumentary: No rashes, no abnormal skin lesions, no petechiae  Neurologic: reflexes normal and symmetric, no cranial nerve deficit  Psych:  Orientation, sensorium, mood normal            Lines:PICC    Rt groin inguinal area on going swelling local pain redness improving and scrotal packing   Data Review:    CBC:   Lab Results   Component Value Date    WBC 5.4 05/18/2022    HGB 10.4 (L) 05/18/2022    HCT 30.9 (L) 05/18/2022    MCV 89.0 05/18/2022     05/18/2022     RENAL:   Lab Results   Component Value Date    CREATININE <0.5 (L) 05/18/2022    BUN 6 (L) 05/18/2022     (L) 05/18/2022    K 3.6 05/18/2022     05/18/2022    CO2 24 05/18/2022     SED RATE:   Lab Results   Component Value Date    SEDRATE 70 05/17/2022     CK: No results found for: CKTOTAL  CRP:   Lab Results   Component Value Date    CRP 16.8 05/17/2022     Hepatic Function Panel:   Lab Results   Component Value Date    ALKPHOS 182 05/06/2022    ALT 11 05/06/2022    AST 14 05/06/2022    PROT 8.0 05/06/2022    BILITOT 1.2 05/06/2022    LABALBU 3.7 05/06/2022     UA:  Lab Results   Component Value Date    COLORU Yellow 05/06/2022    CLARITYU Clear 05/06/2022    GLUCOSEU >=1000 05/06/2022    BILIRUBINUR Negative 05/06/2022    KETUA TRACE 05/06/2022    SPECGRAV 1.052 05/06/2022    BLOODU Negative 05/06/2022    PHUR 6.5 05/06/2022    PROTEINU Negative 05/06/2022    UROBILINOGEN 1.0 05/06/2022    NITRU Negative 05/06/2022    LEUKOCYTESUR Negative 05/06/2022    LABMICR Not Indicated 05/06/2022    Earnstine Dieter NotGiven 05/06/2022      Urine Microscopic:   Lab Results   Component Value Date    BACTERIA Rare 02/27/2014    HYALCAST 1 04/13/2022    WBCUA 397 04/13/2022    RBCUA 20 04/13/2022    EPIU 2 04/13/2022     Urine Reflex to Culture:   Lab Results   Component Value Date    URRFLXCULT Not Indicated 05/06/2022         MICRO: cultures reviewed and updated by me   Blood Culture:          Culture, Anaerobic and Aerobic [[id number]] (Abnormal) Collected: 05/13/22 0836   Order Status: Completed Specimen: Tissue from Scrotum Updated: 05/16/22 0725    Gram Stain Result No Epithelial Cells seen   No WBCs or organisms seen     WOUND/ABSCESS -- Abnormal     Rare growth Mixed skin joan,   No further workup  Abnormal     Anaerobic Culture --    Anaerobic culture further report to follow   No anaerobes isolated so far, Further report to follow     Organism Klebsiella pneumoniae Abnormal     WOUND/ABSCESS --    Rare growth   No further workup   Refer to culture #[id number] for sensitivity results    Narrative:     ORDER#: [id number]                          ORDERED BY: Jo Lassiter   SOURCE: Scrotum right inguinal orchiectmoy COLLECTED:  05/13/22 08:24   ANTIBIOTICS AT [facility]. :                      RECEIVED :  05/13/22 08:50   Culture, Surgical [[id number]] (Abnormal)  Collected: 05/13/22 0759   Order Status: Completed Specimen: Specimen from Scrotum Updated: 05/16/22 0725    Gram Stain Result No Epithelial Cells seen   1+ WBC's (Polymorphonuclear)   1+ RBC's   No organisms seen     Anaerobic Culture --    Anaerobic culture further report to follow   No anaerobes isolated so far, Further report to follow     Organism Klebsiella pneumoniae Abnormal     Culture Surgical Rare growth   Narrative:     ORDER#: [id number]                          ORDERED BY: EMELYN Knox   SOURCE: Scrotum                            COLLECTED:  05/13/22 07:59   ANTIBIOTICS AT [facility]. :                      RECEIVED :  05/13/22 09:22   Quantiferon, Incubated [[id number]] Collected: 05/11/22 1053   Order Status: Completed Specimen: Blood Updated: 05/14/22 1531    Quantiferon TB Minus NIL Negative    Comment: Interpretive Data: Quantiferon TB Gold Plus   Interferon gamma release is measured for specimens from each of the   four   collection tubes. A qualitative result (Negative, Positive, or   Indeterminate)   is based on interpretation of the four values, NIL, MITOGEN minus NIL   (MITOGEN-NIL), TB1 minus NIL (TB1-NIL), and TB2 minus NIL (TB2-NIL). The NIL   value represents nonspecific reactivity produced by the patient   specimen.  The   MITOGEN-NIL value serves as the positive control for the patient   specimen,   demonstrating successful lymphocyte activity. The TB1-NIL tube   specifically   detects CD4+ lymphocyte reactivity, specifically stimulated by the TB1   antigens. The TB2-NIL tube detects both CD4+ and CD8+ lymphocyte   reactivity,   stimulated by TB2 antigens. An overall Negative result does not   completely   rule out TB infection. A false-positive result in the absence of other clinical evidence of TB   infection is not uncommon. Refer to: Updated Guidelines for Using   Interferon   Gamma Release Assays to Detect Mycobacterium tuberculosis Infection ---   [place], 2010   ([employer]),   for more information concerning test performance in low-prevalence   populations and use in occupational screening. Quantiferon TB1 Minus NIL 0.01 IU/mL     Quantiferon TB2 Minus NIL 0.01 IU/mL     QuantiFERON Mitogen >10.00 IU/mL     QuantiFERON Nil 0.01 IU/mL     Comment: Performed By: Hcris Ramonita   [address] [place] [address], [address] [place] [address]   : Lottie Rocha MD       Culture with Smear, Acid Fast Una Curet [[id number]] Collected: 05/13/22 5206   Order Status: Sent Specimen: Tissue from Scrotum Updated: 05/13/22 1603    AFB Smear No AFB observed by Fluorescent stain   Narrative:     ORDER#: [id number]                          ORDERED BY: Jerel Hodgson   SOURCE: Scrotum                            COLLECTED:  05/13/22 08:24   ANTIBIOTICS AT [facility]. :                      RECEIVED :  05/13/22 08:55   Culture with Smear, Acid Fast Bacillius [[id number]] Collected: 05/13/22 0759   Order Status: Sent Specimen: Specimen from Scrotum Updated: 05/13/22 1603    AFB Smear No AFB observed by Fluorescent stain   Narrative:     ORDER#: [id number]                          ORDERED BY: EMELYN Rodarte   SOURCE: Scrotum                            COLLECTED:  05/13/22 07:59   ANTIBIOTICS AT [facility]. :                      RECEIVED :  05/13/22 09:18   Culture, Fungus [[id number]] Collected: 05/13/22 0824   Order Status: Sent Specimen: Tissue from Scrotum Updated: 05/13/22 1116    Fungus Stain No Fungal elements seen   Narrative:     ORDER#: [id number]                          ORDERED BY: Jacquelin Mccauley   SOURCE: Scrotum                            COLLECTED:  05/13/22 08:24   ANTIBIOTICS AT [facility]. :                      RECEIVED :  05/13/22 08:54   Culture, Fungus [[id number]] Collected: 05/13/22 0759   Order Status: Sent Specimen: Specimen from Scrotum Updated: 05/13/22 1116    Fungus Stain No Fungal elements seen   Narrative:     ORDER#: [id number]                          ORDERED BY: EMELYN Long   SOURCE: Scrotum                            COLLECTED:  05/13/22 07:59   ANTIBIOTICS AT [facility]. :                      RECEIVED :  05/13/22 09:22   Culture with Smear, Acid Fast Bacillius [[id number]]    Order Status: Canceled Specimen: Scrotum    Culture, Anaerobic and Aerobic [[id number]]    Order Status: Canceled Specimen: Scrotum    Culture, Fungus [[id number]]    Order Status: Canceled Specimen: Scrotum    COVID-19, Rapid [[id number]] Collected: 05/11/22 1009   Order Status: Completed Specimen: Nasopharyngeal Swab Updated: 05/11/22 1058    SARS-CoV-2, NAAT Not Detected    Comment: Rapid NAAT:   Negative results should be treated as presumptive and,   if inconsistent with clinical signs and symptoms or necessary for   patient management, should be tested with an alternative molecular   assay. Negative results do not preclude SARS-CoV-2 infection and   should not be used as the sole basis for patient management decisions. This test has been authorized by the FDA under an Emergency Use   Authorization (EUA) for use by authorized laboratories.      Fact sheet for Healthcare Providers:   BuildHer.es   Fact sheet for Patients: BuildHer.es     METHODOLOGY: Isothermal Nucleic Acid Amplification       Quantiferon, Incubated [[id number]]    Order Status: Canceled Specimen: Blood    Culture, Blood 2 [[id number]] Collected: 05/06/22 1858   Order Status: Completed Specimen: Blood Updated: 05/10/22 2215    Culture, Blood 2 No Growth after 4 days of incubation. Narrative:     ORDER#: [id number]                          ORDERED BY: Dl Gonsalez   SOURCE: Blood                              COLLECTED:  05/06/22 18:58   ANTIBIOTICS AT [facility]. :                      RECEIVED :  05/06/22 19:02   If child <=2 yrs old please draw pediatric bottle. ~Blood Culture #2   Culture, Blood 1 [[id number]] Collected: 05/06/22 1858   Order Status: Completed Specimen: Blood Updated: 05/10/22 2215    Blood Culture, Routine No Growth after 4 days of incubation. Narrative:     ORDER#: [id number]                          ORDERED BY: Dl Gonsalez   SOURCE: Blood                              COLLECTED:  05/06/22 18:58   ANTIBIOTICS AT [facility]. :                      RECEIVED :  05/06/22 19:02   If child <=2 yrs old please draw pediatric bottle. ~Blood Culture 1   Culture, Blood 1 [[id number]] Collected: 05/06/22 1359   Order Status: Completed Specimen: Blood Updated: 05/10/22 1515    Blood Culture, Routine No Growth after 4 days of incubation. Narrative:     ORDER#: [id number]                          ORDERED BY: Anu Kelley   SOURCE: Blood                              COLLECTED:  05/06/22 13:59   ANTIBIOTICS AT [facility]. :                      RECEIVED :  05/06/22 14:08   If child <=2 yrs old please draw pediatric bottle. ~Blood Culture 1   Culture, Blood 2 [[id number]] Collected: 05/06/22 1359   Order Status: Completed Specimen: Blood Updated: 05/10/22 1515    Culture, Blood 2 No Growth after 4 days of incubation. Narrative:     ORDER#: [id number]                          ORDERED BY: Anu Kelley   SOURCE: Blood                              COLLECTED:  05/06/22 13:59   ANTIBIOTICS AT [facility]. :                      RECEIVED :  05/06/22 14:08   If child <=2 yrs old please draw pediatric bottle. ~Blood Culture #2   Culture, Urine [[id number]] Collected: 05/06/22 1359   Order Status: Completed Specimen: Urine, clean catch Updated: 05/08/22 0800    Urine Culture, Routine No growth at 18 to 36 hours   Narrative:     ORDER#: [id number]                          ORDERED BY: Marisol Shook   SOURCE: Urine Clean Catch                  COLLECTED:  05/06/22 13:59   ANTIBIOTICS AT [facility]. :                      RECEIVED :  05/06/22 21:54   C.trachomatis N.gonorrhoeae DNA, Urine [[id number]] Collected: 05/06/22 2154   Order Status: Completed Specimen: Urine Updated: 05/07/22 0501    C. trachomatis DNA ,Urine Negative    N. gonorrhoeae DNA, Urine Negative         Lab Results   Component Value Date    BC No Growth after 4 days of incubation. 05/06/2022    BLOODCULT2 No Growth after 4 days of incubation.  05/06/2022     Component 4/13/22 1438   Organism Klebsiella pneumoniae Abnormal     Urine Culture, Routine 25,000 CFU/ml    Resulting Agency [address] [facility]            Susceptibility        Klebsiella pneumoniae (1)     Antibiotic Interpretation Microscan   Method Status     amoxicillin-clavulanate Sensitive <=8/4 mcg/mL BACTERIAL SUSCEPTIBILITY PANEL BY KRISTI       ampicillin Resistant >16 mcg/mL BACTERIAL SUSCEPTIBILITY PANEL BY KRISTI       ampicillin-sulbactam Sensitive <=8/4 mcg/mL BACTERIAL SUSCEPTIBILITY PANEL BY KRISTI       ceFAZolin Sensitive <=2 mcg/mL BACTERIAL SUSCEPTIBILITY PANEL BY KRISTI         NOTE: Cefazolin should only be used for uncomplicated UTI         for E.coli or Klebsiella pneumoniae.           cefepime Sensitive <=2 mcg/mL BACTERIAL SUSCEPTIBILITY PANEL BY KRISTI       cefTRIAXone Sensitive <=1 mcg/mL BACTERIAL SUSCEPTIBILITY PANEL BY KRISTI       cefuroxime Sensitive <=4 mcg/mL BACTERIAL SUSCEPTIBILITY PANEL BY KRISTI       ciprofloxacin Sensitive <=1 mcg/mL BACTERIAL SUSCEPTIBILITY PANEL BY KRISTI       ertapenem Sensitive <=0.5 mcg/mL BACTERIAL SUSCEPTIBILITY PANEL BY KRISTI       gentamicin Sensitive <=4 mcg/mL BACTERIAL SUSCEPTIBILITY PANEL BY KRISTI       meropenem Sensitive <=1 mcg/mL BACTERIAL SUSCEPTIBILITY PANEL BY KRISTI       nitrofurantoin Resistant >64 mcg/mL BACTERIAL SUSCEPTIBILITY PANEL BY KRISTI       piperacillin-tazobactam Sensitive <=16 mcg/mL BACTERIAL SUSCEPTIBILITY PANEL BY KRISTI       trimethoprim-sulfamethoxazole Sensitive <=2/38 mcg/mL BACTERIAL SUSCEPTIBILITY PANEL BY KRISTI             Narrative  Performed by: [facility]  ORDER#: [id number]                          ORDERED BY: Meredith Neves   SOURCE: Urine Clean Catch                  COLLECTED:  04/13/22                Procedure Component Value Units Date/Time   Culture, Blood 2 [[id number]] Collected: 05/06/22 1359   Order Status: Sent Specimen: Blood Updated: 05/06/22 1420   Culture, Blood 1 [[id number]] Collected: 05/06/22 1359   Order Status: Sent Specimen: Blood Updated: 05/06/22 1420       Respiratory Culture:  Lab Results   Component Value Date    LABGRAM  05/13/2022     No Epithelial Cells seen  No WBCs or organisms seen       AFB:  Lab Results   Component Value Date    AFBSMEAR No AFB observed by Fluorescent stain 05/13/2022     Viral Culture:  Lab Results   Component Value Date    COVID19 Not Detected 05/11/2022     Urine Culture:   No results for input(s): Darnelle Eaves in the last 72 hours. IMAGING:    CT ABDOMEN PELVIS W IV CONTRAST Additional Contrast? None   Final Result   Worsening right epididymal orchitis as described above with a small fluid   collection in the pelvis as measured above.              CT ABDOMEN PELVIS W IV CONTRAST Additional Contrast? None   Final Result   Worsening right epididymal orchitis as described above with a small fluid   collection in the pelvis as measured above.              USG sCROTUM :  4/13/22     Impression   Findings are most suggestive of right epididymo-orchitis.  Clinical follow-up   to resolution is recommended.       Flow was seen within each testicle, arguing against acute torsion.       3 mm left epididymal cyst or spermatocele.       RECOMMENDATIONS:   Unavailable             All the pertinent images and reports for the current Hospitalization were reviewed by me     Scheduled Meds:   cefTRIAXone (ROCEPHIN) IV  2,000 mg IntraVENous Q24H    lidocaine 1 % injection  5 mL IntraDERmal Once    metFORMIN  1,000 mg Oral BID WC    insulin glargine  15 Units SubCUTAneous BID    insulin lispro  0.08 Units/kg SubCUTAneous TID WC    sodium chloride flush  10 mL IntraVENous 2 times per day    enoxaparin  40 mg SubCUTAneous Daily    insulin lispro  0-12 Units SubCUTAneous TID WC    insulin lispro  0-6 Units SubCUTAneous Nightly       Continuous Infusions:   sodium chloride Stopped (05/17/22 1848)    sodium chloride Stopped (05/13/22 0512)    dextrose      sodium chloride Stopped (05/18/22 0526)       PRN Meds:  oxyCODONE, sodium chloride, morphine, ibuprofen, sodium chloride flush, sodium chloride, potassium chloride **OR** potassium alternative oral replacement **OR** potassium chloride, potassium chloride, magnesium sulfate, promethazine **OR** ondansetron, magnesium hydroxide, acetaminophen **OR** acetaminophen, glucose, dextrose, glucagon (rDNA), dextrose      Assessment:     Patient Active Problem List   Diagnosis    Abdominal pain       Sepsis resolving   Fevers resolving   WBC normal from partial treatment   Rt inguinal fluid collection concern for abscess on the CT  Rt epididymo orchitis with extension into inguinal canal  UTI recent with Klebsiella pneumoniae partially treated  DM poor control ( NOT TAKING MEDS > 2 YR due to lack of INSURANCE)  Remote HISTORY of Gonorrhea  CT abd/pelvis with worsening of Orchitis compared to before        UNfortunately has not responded to out patient treatment as expected despite appropriate course of abx makes me wonder if he has not Taken his MEDS  Due to Lack of INSURANCE or due to Uncontrolled DM he is having complications     As this is some what unusual if Klebsiella is the Culprit Organism was sensitive to Bactrim, Cipro  Etc       He has made minimal progress on broad spectrum IV abx and most likely needs surgery will d/w Urology about their plans    Urology following closely is a plan for possible surgical intervention      Check TB QuantiFERON - and He is BCG vaccinated     S/p Rt Orchiectomy and drainage of the inguinal area OR cx  As suspected no with Klebsiella and likely the culprit that was detected on Urine cx from March 18 2022     Labs, Microbiology, Radiology and all the pertinent results from current hospitalization and  care every where were reviewed  by me as a part of the evaluation   Plan:   1. IV Ceftriaxone x 2 gm x q 24 hr x 7 days at d/c  2. picc IN PLACE  3. Start oral Levofloxacin after iv abx completed     4. Control DM    5. HbA1C very elevated    6. bLOOD CX NGDT  7. HIV -ve, Hepatitis, Gonorrhea -ve, Chlamydia -ve, Syphilis screen  -ve  8. ESR. CRP trend down   9. Social service assistance for antibiotic help  10. S/p Orchiectomy Rt    11. OR cx KLEBSIELLA NOTED   12. PICC  13. KENNEDY done      Discussed with patient/Family and Nursing  d/w         Thanks for allowing me to participate in your patient's care and please call me with any questions or concerns.     Monique Flannery MD  Infectious Disease  [facility] Physician  Phone: [phone]   Fax : [phone]

## 2022-05-18 NOTE — CARE COORDINATION
CASE MANAGEMENT DISCHARGE SUMMARY:    DISCHARGE DATE: 05/18/22    DISCHARGED TO: home     Discharging to Facility/ Agency   · Name:  [facility] with [employer] for IVAB    · Address: [address] [place] [address]., [address] [place] [address]  · Phone: [phone]  · Fax: [phone]     TRANSPORTATION: has a ride             TIME: on call    Was given [facility] information on admission along with [facility] information. Encouraged to call for an appointment tomorrow for follow-up with the clinic & to reach out to [facility] for follow-up oral antibiotic.     Declan Estraad RN, BSN, Case Management  [phone]    Electronically signed by Declan Estrada RN on 5/18/2022 at 2:03 PM

## 2022-05-18 NOTE — PROGRESS NOTES
This RN prepared pt for discharge to home at 1335. This RN provided discharge education regarding medication regimen, and home care. Pt. Verbalized understanding. Denies questions. Wound and midline present at discharge. Wound cleansed with normal saline new dressing placed with 4x4 gauze with transparent dressing. Given IV analgesic after change for severe pain. Discontinued peripheral IVs without complications. Pt. tolerated well.   Pt waiting on rocephin infusion, meds to beds and transport  Pt is being transported by family member

## 2022-05-18 NOTE — CARE COORDINATION
[facility]    DC order noted, all docs needed have been faxed to [facility] for home care services.     Home care to see patient within 24-48 hrs    Isaak Cadena RN, BSN CTN  [facility] [phone]

## 2022-05-24 ENCOUNTER — TELEPHONE (OUTPATIENT)
Dept: INFECTIOUS DISEASES | Age: 37
End: 2022-05-24

## 2022-05-25 ENCOUNTER — TELEPHONE (OUTPATIENT)
Dept: INFECTIOUS DISEASES | Age: 37
End: 2022-05-25

## 2022-05-26 ENCOUNTER — TELEPHONE (OUTPATIENT)
Dept: PHARMACY | Age: 37
End: 2022-05-26

## 2022-05-26 RX ORDER — METFORMIN HYDROCHLORIDE 500 MG/1
1000 TABLET, EXTENDED RELEASE ORAL 2 TIMES DAILY WITH MEALS
Qty: 120 TABLET | Refills: 2 | Status: SHIPPED | OUTPATIENT
Start: 2022-05-26

## 2022-05-26 NOTE — TELEPHONE ENCOUNTER
New Diabetes referral from the hospitalist.  Recommended by the Diabetes Educator. Reached out to schedule initial appointment. Skyler Camacho reports being out of metformin. RX at discharge written for a 7 day supply. Sent new RX for 30 day supply. Orders Placed This Encounter   Medications    metFORMIN (GLUCOPHAGE-XR) 500 mg extended release tablet     Sig: Take 2 tablets by mouth 2 times daily (with meals)     Dispense:  120 tablet     Refill:  2     E-Prescribing Status: Receipt confirmed by pharmacy (5/26/2022  3:49 PM EDT)    Scheduled for 6/6/22. Will need refill for Lantus at our visit to make it to his DM appt 7/20/22. I will wait until then in case we need to adjust his Lantus dose.       Ramonita Salas, PharmD  [address] [place] [address]  Diabetes Service  [phone]

## 2022-05-31 LAB
Lab: NORMAL
REPORT: NORMAL
THIS TEST SENT TO: NORMAL

## 2022-05-31 RX ORDER — LEVOFLOXACIN 750 MG/1
750 TABLET ORAL DAILY
Qty: 7 TABLET | Refills: 0 | Status: SHIPPED | OUTPATIENT
Start: 2022-05-31 | End: 2022-06-07

## 2022-06-06 ENCOUNTER — OFFICE VISIT (OUTPATIENT)
Dept: PHARMACY | Age: 37
End: 2022-06-06

## 2022-06-06 ENCOUNTER — TELEPHONE (OUTPATIENT)
Dept: PHARMACY | Age: 37
End: 2022-06-06

## 2022-06-06 VITALS
SYSTOLIC BLOOD PRESSURE: 120 MMHG | DIASTOLIC BLOOD PRESSURE: 75 MMHG | HEART RATE: 80 BPM | BODY MASS INDEX: 27.25 KG/M2 | WEIGHT: 184.5 LBS | OXYGEN SATURATION: 99 %

## 2022-06-06 DIAGNOSIS — E11.65 UNCONTROLLED TYPE 2 DIABETES MELLITUS WITH HYPERGLYCEMIA (HCC): Primary | ICD-10-CM

## 2022-06-06 PROCEDURE — 99214 OFFICE O/P EST MOD 30 MIN: CPT

## 2022-06-06 NOTE — PATIENT INSTRUCTIONS
Today make these medication changes:   - Increase Lantus insulin to 35 units every night    Be sure to take your medications as instructed. Avoid missing doses. 1. Work to lose weight if you are overweight. This will help improve blood sugar control. 2. Work to improve or maintain Physical Activity:    - goal of 30 minutes 5 days a week. 3. Keep good blood pressure control:   - goal < 130/80    4. Keep good cholesterol control    5. Work to quit smoking or remain smoke free    6. Your doctor will check a urine test each year called a Microalbumin / Creatinine Urine Ratio. This looks at how Diabetes may be affecting your kidney function. 7. You should get an eye exam every year    8. Your doctor will perform a comprehensive foot exam every year. Follow the foot care recommendations provided in our Diabetes booklet. 9. Get your flu vaccine every year during flu season usual starting in October    10. Get your pneumonia vaccine. Please see our booklet for recommended vaccines and how often. 11. Make these changes to your eating and drinking patterns:  [x] Work to eliminate sugary beverages  [x] Mediterranean eating pattern  [x] Low carbohydrate eating pattern  [] My plate method  [] Carbohydrate counting     13. Please seek medical advice if you have blood sugars that run in the 300's or above. 14. If you have symptoms of low blood sugar such as feeling shaky, lightheaded, dizzy, sleepy, or confused then test your sugar. 15. If you have low blood sugar of less than 70mg/dL, then eat or drink something with 15 grams of carbohydrates like a half cup of juice (4 oz) or 3-4 glucose tablets. Recheck your sugar in 15 minutes. If your blood glucose is less than 70mg/dL again, have another 15 grams of carbohydrates. Continue until above 70mg/dL. Call your physician if you experience low blood sugars. 16. Please call any time with questions or concerns at [phone].

## 2022-06-06 NOTE — PROGRESS NOTES
our center. Diabetes at least since 2016. Here today for assistance with transition of care. Objective   /75   Pulse 80   Wt 184 lb 8 oz (83.7 kg)   SpO2 99%   BMI 27.25 kg/m²     Past Medical History:   Diagnosis Date    Diabetes mellitus ([address]        HPI: No diagnosis found. Social History     Tobacco Use    Smoking status: Never Smoker    Smokeless tobacco: Never Used   Substance Use Topics    Alcohol use: No       Pertinent Labs:    Lab Results   Component Value Date    LABA1C 12.0 05/07/2022    LABA1C 12.3 05/06/2022       Lab Results   Component Value Date    CREATININE <0.5 (L) 05/18/2022    BUN 6 (L) 05/18/2022     (L) 05/18/2022    K 3.6 05/18/2022     05/18/2022    CO2 24 05/18/2022       No results found for: MALBCR    No results found for: CHOL, TRIG, HDL, LDLCALC  No results found for: LDLDIRECT    Lab Results   Component Value Date    ALT 11 05/06/2022       Weight:  Wt Readings from Last 3 Encounters:   06/06/22 184 lb 8 oz (83.7 kg)   05/18/22 171 lb 1.2 oz (77.6 kg)   04/13/22 184 lb 11.9 oz (83.8 kg)       Immunizations:   Most Recent Immunizations   Administered Date(s) Administered    COVID-19, [employer] top, DO NOT Dilute, Mario-Sucrose, 12+ yrs, PF, 30 mcg/0.3 mL dose 05/17/2022       Blood Glucose Assessment     Home Blood Glucose Results:     Date BREAKFAST LUNCH DINNER BEDTIME     BG Before Insulin (units) BG   2 hrs after BG Before Insulin (units) BG   2 hrs after BG Before Insulin  (units) BG   2 hrs after BG Bedtime Long acting insulin   5/30 412 - - - - - - - - - 30   6/1 340 - - - - - - - - - 30   6/2 449 - - - - - - - - - 30   6/3 - - - - - - - - - - 30   6/4 - - - - - - - - - - 30   6/5 - - - - - - - - - - 30   6/6 302 - - - - - - - - - -       They brought their blood glucose meter to today's visit    They did not bring their blood glucose and meal log to today's visit    Missed Lantus one to two doses more than a week ago.  Visiting his children and forgot to take it with him. Thorough Medication Assessment     Current medications:  Current Outpatient Medications   Medication Sig Dispense Refill    levoFLOXacin (LEVAQUIN) 750 MG tablet Take 1 tablet by mouth daily for 7 days 7 tablet 0    metFORMIN (GLUCOPHAGE-XR) 500 mg extended release tablet Take 2 tablets by mouth 2 times daily (with meals) 120 tablet 2    glucose monitoring (FREESTYLE FREEDOM) kit 1 kit by Does not apply route daily 1 kit 0    Blood Glucose Monitoring Suppl (TRUE METRIX METER) w/Device KIT 1 kit by Does not apply route 4 times daily 1 kit 0    blood glucose test strips (TRUE METRIX BLOOD GLUCOSE TEST) strip 1 each by In Vitro route daily As needed. 100 each 3    Lancets MISC 1 each by Does not apply route 2 times daily 100 each 0    insulin glargine (LANTUS SOLOSTAR) 100 UNIT/ML injection pen Inject 30 Units into the skin nightly 5 pen 0     No current facility-administered medications for this visit. Reviewed and updated all medications at today's visit. No adverse reactions, however, he has had vision changes and he feels this may be due to metformin. This is not likely a side effect of metformin. Likely related to elevated BG. Provided counseling with regards to Diabetes medications    Device Education:   No education provided today with regards to diabetic devices      Further Assessment / Education     General visit information:   Ailyn Candy appears well. Engaged in our visit today. Interested in improving diabetes care to reach A1c goal.     Stressed the importance of good diabetes control for abscess healing as well as improving symptoms and preventing other complications. Currently taking Levaquin. Delayed in starting due to holiday weekend. Started this 6/1 for 7 days. Just saw his physician today with regards to his abscess and reports he got a good report. Still not released to go back to work.      Received his Metformin prescription that I sent in 5/26. Has 3 Lantus pens remaining with 1 RF. Should be enough to make it to his physician visit 7/20/22. Concerned about the cost.  Spoke with the pharmacy and it is $35.  He will  at a later date. Stressed the importance of not missing his diabetes medication. Patient's current eating patterns:   - \"may be eating too much affecting his BG\"    - Beverages - water or \"Zero\" drinks    Physical current activity:   - limiting activity per physician instructions. Normally works constructions    Wells Claudia management plan:   - N/A. Uncomfortable with losing any more weight. General Diabetes Education:   Difference between Type 1, Prediabetes and Type 2 and the progressive nature of Type 2  Diagnosis criteria  Symptoms  Complications including Nephropathy, Neuropathy, Retinopathy, Heart Disease and Foot complications  K4D target  Weight loss  Physical Activity with a goal of 30 minutes 5 times a week  Healthy eating patterns, My Plate Method of Eating, and Carbohydrate Counting  Dietitian role  Blood Glucose and Meal Log  Management of low and high blood glucose readings  Blood pressure control with a goal of < 130/80  Cholesterol control and possible statin medication  Annual Microalbumin / Creatinine Ratio, annual eye exam, and comprehensive annual foot exam and proper self foot care  Vaccinations    Diabetes booklet given: yes      - Current Smoking Assessment:   Non-smoker    Physician Follow-up for Diabetes: Yes      Further Assessment / Plan     No diagnosis found.     Diabetes Medication Changes and or Recommendations Made Today:   - Increase Lantus to 35 units nightly  - Change in Eating Patterns:    * Continue to eliminate sugary beverages   * Mediterranean eating pattern, Low carbohydrates,  Carbohydrate counting, My Plate method of eating  - Physical Activity Plan: goal of 30 minutes 5 days a week  - Hypertension: Blood pressure goal less than 130/80  - Hyperlipidemia: educated about lifestyle modification including reducing saturated fats, choosing lean meats, fruits and vegetables, nuts, whole grains, and regular activity  - Recommended Influenza vaccine  - Recommended Pneumonia vaccine    Recommendations to the physician:  - Lipid panel with possible statin therapy. - Microalbumin / creatinine ratio  - Foot and Eye exam  - Vaccinations as indicated  - continue Lantus adjustment to A1c target. - ID f/u as indicated. [address] Follow-up   Diabetes Service   Yes, telephone f/u to adjust insulin in one week. Electronically signed by Nury Baker PharmD on 6/6/2022 at 12:54 PM      CLINICAL PHARMACY CONSULT: MED RECONCILIATION/REVIEW [address] [facility] in place:   Yes   Recommendation Provided To: Patient/Caregiver: 1 via In person   Intervention Detail: Dose Adjustment: 1, reason: Therapy Optimization   Gap Closed?: No    Total # of Interventions Recommended: 1   Total # of Interventions Accepted: 1   Intervention Accepted By: Patient/Caregiver: 1   Time Spent (min): 90

## 2022-06-12 LAB
FUNGUS (MYCOLOGY) CULTURE: ABNORMAL
FUNGUS STAIN: ABNORMAL
ORGANISM: ABNORMAL

## 2022-06-13 LAB
FUNGUS (MYCOLOGY) CULTURE: NORMAL
FUNGUS STAIN: NORMAL

## 2022-06-28 LAB
AFB CULTURE (MYCOBACTERIA): NORMAL
AFB CULTURE (MYCOBACTERIA): NORMAL
AFB SMEAR: NORMAL
AFB SMEAR: NORMAL

## 2022-06-29 NOTE — TELEPHONE ENCOUNTER
Spoke with Tom Shay. Reports that he is staying at his brothers and forgot his meter. He will get the meter and start checking again. He reports most recent  and 185. No BG readings < 150. He has been taking 38 units of Lantus nightly. I advised to go ahead and increase his dose to 40 units nightly. Scheduled f/u here 7/1/22.     Courtney Romero, PharmD  [facility]  Diabetes Service  [phone]

## 2022-07-01 ENCOUNTER — OFFICE VISIT (OUTPATIENT)
Dept: PHARMACY | Age: 37
End: 2022-07-01

## 2022-07-01 ENCOUNTER — TELEPHONE (OUTPATIENT)
Dept: PHARMACY | Age: 37
End: 2022-07-01

## 2022-07-01 DIAGNOSIS — E11.65 UNCONTROLLED TYPE 2 DIABETES MELLITUS WITH HYPERGLYCEMIA (HCC): Primary | ICD-10-CM

## 2022-07-01 PROCEDURE — 99213 OFFICE O/P EST LOW 20 MIN: CPT

## 2022-07-01 NOTE — PROGRESS NOTES
[facility]  Pharmacy  Diabetes Service    [address], [address]  Phone: [phone]  Fax: [phone]    NAME: Kaylah Staples RECORD NUMBER:  [id number]  AGE: 44 y.o. GENDER: male  : 1985  EPISODE DATE:  2022       Luis Reid was referred to the [facility] by Dr. Lissa Beach for Diabetes services. Special Instructions per the Referral Include: Patient Education and Medication Management    1. Uncontrolled type 2 diabetes mellitus with hyperglycemia [facility])        Referral goals: No goals were included on the referral     If goals are not included on the referral, ADA guidelines will be used. This visit was performed as:  THIS VISIT WAS PERFORMED AS: An in person visit. Protocols were followed with precautions to reduce the spread of COVID-19. Iveth House is a 44 y.o. here for the Diabetes Service for self-management education, medication review including over the counter medications and herbal products, overall wellbeing assessment, transition of care and any needed adjustments with updates and recommendations communicated to the referring physician. Patient findings:    Hyperglycemia Symptoms: none noted today    Hypoglycemia Symptoms: Shakiness at times      Other general findings affecting Diabetes:   Headaches. Previous reported vision cahnges    Medicaid pending    F/u  with a new physician to manage his Diabetes. Comments:   - F/u in our center today. Diabetes at least since 2016. Continued assistance with transition of care. Objective   There were no vitals taken for this visit. Past Medical History:   Diagnosis Date    Diabetes mellitus (HCC)        HPI:   1.  Uncontrolled type 2 diabetes mellitus with hyperglycemia (HCC)        Social History     Tobacco Use    Smoking status: Never Smoker    Smokeless tobacco: Never Used   Substance Use Topics  Alcohol use: No       Pertinent Labs:    Lab Results   Component Value Date    LABA1C 12.0 05/07/2022    LABA1C 12.3 05/06/2022       Lab Results   Component Value Date    CREATININE <0.5 (L) 05/18/2022    BUN 6 (L) 05/18/2022     (L) 05/18/2022    K 3.6 05/18/2022     05/18/2022    CO2 24 05/18/2022       No results found for: MALBCR    No results found for: CHOL, TRIG, HDL, LDLCALC  No results found for: LDLDIRECT    Lab Results   Component Value Date/Time    ALT 11 05/06/2022 02:05 PM       Weight:  Wt Readings from Last 3 Encounters:   06/06/22 184 lb 8 oz (83.7 kg)   05/18/22 171 lb 1.2 oz (77.6 kg)   04/13/22 184 lb 11.9 oz (83.8 kg)       Immunizations:   Most Recent Immunizations   Administered Date(s) Administered    COVID-19, [employer] top, DO NOT Dilute, (age 15 y+), IM, 30 mcg/0.3 mL 05/17/2022       Blood Glucose Assessment       Home Blood Glucose Results:     Date BREAKFAST LUNCH DINNER BEDTIME     BG Before Insulin (units) BG   2 hrs after BG Before Insulin (units) BG   2 hrs after BG Before Insulin  (units) BG   2 hrs after BG Bedtime Long acting insulin   6/20 - - - - - - 223 - - - 38   6/22 217 - - - - - - - - - 38   6/29 - - - - - - - - - 261 40   6/30 - - - 219 - - - - - - 40   7/1 - - - - - - - - - - -       They brought their blood glucose meter to today's visit    They did not bring their blood glucose and meal log to today's visit    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      Thorough Medication Assessment     Current medications:  Current Outpatient Medications   Medication Sig Dispense Refill    metFORMIN (GLUCOPHAGE-XR) 500 mg extended release tablet Take 2 tablets by mouth 2 times daily (with meals) 120 tablet 2    glucose monitoring (FREESTYLE FREEDOM) kit 1 kit by Does not apply route daily 1 kit 0    Blood Glucose Monitoring Suppl (TRUE METRIX METER) w/Device KIT 1 kit by Does not apply route 4 times daily 1 kit 0    blood glucose test strips (TRUE METRIX BLOOD GLUCOSE TEST) strip 1 each by In Vitro route daily As needed. 100 each 3    Lancets MISC 1 each by Does not apply route 2 times daily 100 each 0    insulin glargine (LANTUS SOLOSTAR) 100 UNIT/ML injection pen Inject 30 Units into the skin nightly (Patient taking differently: Inject 40 Units into the skin nightly ) 5 pen 0     No current facility-administered medications for this visit. Reviewed and updated all medications at today's visit. No adverse reactions     Provided counseling with regards to Diabetes medications    Device Education:   Provided education with regards to diabetic devices. Talked about the SQMOS. He asked if there was a way to check his BG without pricking his finger. Further Assessment / Education     General visit information:   Kanchan Steen appears well. Engaged in our visit today. He came a little late so our visit was cut short due to him having another visit. Reports the pharmacy stated he didn't have a Lantus refill. However, last visit they stated he had a refill at $35. He reports he has not yet picked that up. I spoke with him 6/29 and increased his Lantus to 40 units at that time. We have only one BG reading since that change. Therefore I will give it more time before changing his Lantus dose further. Patient's current eating patterns:   - did not discuss specifics. Encouraged low carbohydrates and healthy eating patterns. Physical current activity:   - 20-30 minutes per day primarily lifting weights. Activity was limited recently per MD due to abscess. Weight management plan:   - N/A. Uncomfortable with losing any more weight. General Diabetes Education:   SQMOS  Reducing BG slowly over time to give his body time to adjust without symptoms. Current Smoking Assessment:   Non-smoker    Physician Follow-up for Diabetes: Yes      Further Assessment / Plan     1.  Uncontrolled type

## 2022-07-01 NOTE — TELEPHONE ENCOUNTER
Reached out to Cynthia to check status on his Diabetes and to see about scheduling a f/u. He reports he is doing well and following with a new PCP at [facility] that is managing his diabetes. He reports his BG ~ 150 in the am and rises to the 200's mid day and then to 170-180 in the evening. New PCP is adjusting medication. Now has a CGM device. I will go ahead and close out his referral. Cha Ackermany him to reach out should he need any further assistance.        Natalia Mcarthur, PharmD  [facility]  Diabetes Service  [phone]

## (undated) DEVICE — PENROSE DRAIN 12" X 1/4: Brand: CARDINAL HEALTH

## (undated) DEVICE — SOLUTION IV IRRIG POUR BRL 0.9% SODIUM CHL 2F7124

## (undated) DEVICE — GLOVE ORANGE PI 8 1/2   MSG9085

## (undated) DEVICE — MINOR SET UP: Brand: MEDLINE INDUSTRIES, INC.

## (undated) DEVICE — STRIP,CLOSURE,WOUND,MEDI-STRIP,1/2X4: Brand: MEDLINE

## (undated) DEVICE — TUBING, SUCTION, 1/4" X 12', STRAIGHT: Brand: MEDLINE

## (undated) DEVICE — GAUZE FLUFF 2 PLY: Brand: DEROYAL

## (undated) DEVICE — DRESSING,GAUZE,XEROFORM,CURAD,1"X8",ST: Brand: CURAD

## (undated) DEVICE — ELECTRODE ELECSURG NDL 2.8 INX7.2 CM COAT INSUL EDGE

## (undated) DEVICE — DRAPE,MINOR PROC,6X6 FEN, STER: Brand: MEDLINE

## (undated) DEVICE — SYRINGE MED 10ML LUERLOCK TIP W/O SFTY DISP

## (undated) DEVICE — SUTURE CHROMIC GUT SZ 3-0 L27IN ABSRB BRN L26MM SH 1/2 CIR G122H

## (undated) DEVICE — TOWEL,OR,DSP,ST,BLUE,STD,4/PK,20PK/CS: Brand: MEDLINE

## (undated) DEVICE — CANISTER, RIGID, 1200CC: Brand: MEDLINE INDUSTRIES, INC.

## (undated) DEVICE — Z DISCONTINUED BY MEDLINE USE 2711682 TRAY SKIN PREP DRY W/ PREM GLV

## (undated) DEVICE — SUPPORT SCROT XL WHT COT ATH W/ LEG STRP ADJ E WAISTBAND

## (undated) DEVICE — SUTURE VCRL + SZ 0 L27IN ABSRB UD CT-1 L36MM 1/2 CIR TAPR VCP260H

## (undated) DEVICE — STANDARD HYPODERMIC NEEDLE,ALUMINUM HUB: Brand: MONOJECT